# Patient Record
Sex: FEMALE | Race: WHITE | Employment: OTHER | URBAN - METROPOLITAN AREA
[De-identification: names, ages, dates, MRNs, and addresses within clinical notes are randomized per-mention and may not be internally consistent; named-entity substitution may affect disease eponyms.]

---

## 2018-07-03 ENCOUNTER — HOSPITAL ENCOUNTER (OUTPATIENT)
Age: 48
Discharge: HOME | End: 2018-07-05
Payer: COMMERCIAL

## 2018-07-03 PROCEDURE — G0123 SCREEN CERV/VAG THIN LAYER: HCPCS

## 2018-07-03 PROCEDURE — 87624 HPV HI-RISK TYP POOLED RSLT: CPT

## 2018-07-12 LAB
CORRESPONDING PAP CASE #: NORMAL
HPV, HIGH RISK: NEGATIVE

## 2019-03-18 ENCOUNTER — HOSPITAL ENCOUNTER (INPATIENT)
Age: 49
LOS: 4 days | Discharge: HOME | DRG: 638 | End: 2019-03-22
Attending: EMERGENCY MEDICINE | Admitting: FAMILY MEDICINE
Payer: COMMERCIAL

## 2019-03-18 ENCOUNTER — APPOINTMENT (OUTPATIENT)
Dept: GENERAL RADIOLOGY | Age: 49
DRG: 638 | End: 2019-03-18
Payer: COMMERCIAL

## 2019-03-18 ENCOUNTER — HOSPITAL ENCOUNTER (OUTPATIENT)
Age: 49
Discharge: HOME | End: 2019-03-18
Payer: COMMERCIAL

## 2019-03-18 DIAGNOSIS — R07.9 CHEST PAIN, UNSPECIFIED TYPE: ICD-10-CM

## 2019-03-18 DIAGNOSIS — E87.20 ACIDOSIS: ICD-10-CM

## 2019-03-18 DIAGNOSIS — E11.10 TYPE 2 DIABETES MELLITUS WITH KETOACIDOSIS WITHOUT COMA, WITHOUT LONG-TERM CURRENT USE OF INSULIN (HCC): Primary | ICD-10-CM

## 2019-03-18 LAB
ALBUMIN SERPL-MCNC: 4.4 G/DL (ref 3.5–5.2)
ALP BLD-CCNC: 114 U/L (ref 35–104)
ALT SERPL-CCNC: 19 U/L (ref 0–32)
ANION GAP SERPL CALCULATED.3IONS-SCNC: 21 MMOL/L (ref 7–16)
ANION GAP SERPL CALCULATED.3IONS-SCNC: 25 MMOL/L (ref 7–16)
ARTERIAL PH AT TEMPERATURE: 7.23 (ref 7.35–7.45)
AST SERPL-CCNC: 15 U/L (ref 0–31)
BACTERIA: NORMAL /HPF
BASE EXCESS: ABNORMAL MMOL/L (ref -3–3)
BETA-HYDROXYBUTYRATE: >4.5 MMOL/L (ref 0.02–0.27)
BILIRUB SERPL-MCNC: 0.4 MG/DL (ref 0–1.2)
BILIRUBIN URINE: ABNORMAL
BLOOD, URINE: ABNORMAL
BUN BLDV-MCNC: 10 MG/DL (ref 6–20)
BUN BLDV-MCNC: 9 MG/DL (ref 6–20)
CALCIUM SERPL-MCNC: 9.2 MG/DL (ref 8.6–10.2)
CALCIUM SERPL-MCNC: 9.5 MG/DL (ref 8.6–10.2)
CHLORIDE BLD-SCNC: 102 MMOL/L (ref 98–107)
CHLORIDE BLD-SCNC: 104 MMOL/L (ref 98–107)
CLARITY: ABNORMAL
CO2: 9 MMOL/L (ref 22–29)
CO2: 9 MMOL/L (ref 22–29)
COHB: 0.6 % (ref 0–1.5)
COLOR: YELLOW
COMMENT: ABNORMAL
CREAT SERPL-MCNC: 0.6 MG/DL (ref 0.5–1)
CREAT SERPL-MCNC: 0.7 MG/DL (ref 0.5–1)
CRITICAL: ABNORMAL
D DIMER: 202 NG/ML DDU
DATE ANALYZED: ABNORMAL
DATE OF COLLECTION: ABNORMAL
DRAWN BY: ABNORMAL
GFR AFRICAN AMERICAN: >60
GFR AFRICAN AMERICAN: >60
GFR NON-AFRICAN AMERICAN: >60 ML/MIN/1.73
GFR NON-AFRICAN AMERICAN: >60 ML/MIN/1.73
GLUCOSE BLD-MCNC: 184 MG/DL (ref 74–99)
GLUCOSE BLD-MCNC: 220 MG/DL (ref 74–99)
GLUCOSE URINE: >=1000 MG/DL
HCO3: ABNORMAL MMOL/L (ref 22–26)
HCT VFR BLD CALC: 47.6 % (ref 34–48)
HEMOGLOBIN: 15.9 G/DL (ref 11.5–15.5)
HHB: 1.4 % (ref 0–5)
KETONES, URINE: >=80 MG/DL
LAB: ABNORMAL
LACTIC ACID: 1.2 MMOL/L (ref 0.5–2.2)
LEUKOCYTE ESTERASE, URINE: NEGATIVE
Lab: ABNORMAL
MCH RBC QN AUTO: 27.2 PG (ref 26–35)
MCHC RBC AUTO-ENTMCNC: 33.4 % (ref 32–34.5)
MCV RBC AUTO: 81.4 FL (ref 80–99.9)
METER GLUCOSE: 221 MG/DL (ref 74–99)
METER GLUCOSE: 234 MG/DL (ref 74–99)
METHB: 0.6 % (ref 0–1.5)
MODE: ABNORMAL
MODE: ABNORMAL
NITRITE, URINE: NEGATIVE
O2 SATURATION: 98.6 % (ref 92–98.5)
O2 SATURATION: ABNORMAL % (ref 95–100)
O2HB: 97.4 % (ref 94–97)
OPERATOR ID: ABNORMAL
PATIENT TEMP: 37 C
PCO2: <12 MMHG (ref 35–45)
PCO2: <15 MMHG (ref 35–45)
PDW BLD-RTO: 13.5 FL (ref 11.5–15)
PH BLOOD GAS: 7.18 (ref 7.35–7.45)
PH UA: 5 (ref 5–9)
PLATELET # BLD: 236 E9/L (ref 130–450)
PMV BLD AUTO: 10.3 FL (ref 7–12)
PO2: 138.1 MMHG (ref 60–100)
PO2: 153.8 MMHG (ref 60–80)
POTASSIUM SERPL-SCNC: 3.9 MMOL/L (ref 3.5–5)
POTASSIUM SERPL-SCNC: 4.4 MMOL/L (ref 3.5–5)
PROTEIN UA: 100 MG/DL
RBC # BLD: 5.85 E12/L (ref 3.5–5.5)
RBC UA: NORMAL /HPF (ref 0–2)
REASON FOR REJECTION: NORMAL
REJECTED TEST: NORMAL
SAMPLE SITE: ABNORMAL
SODIUM BLD-SCNC: 134 MMOL/L (ref 132–146)
SODIUM BLD-SCNC: 136 MMOL/L (ref 132–146)
SOURCE, BLOOD GAS: ABNORMAL
SPECIFIC GRAVITY UA: 1.02 (ref 1–1.03)
SPECIMEN SOURCE: ABNORMAL
THB: 17.7 G/DL (ref 11.5–16.5)
TIME ANALYZED: 1906
TOTAL PROTEIN: 8.3 G/DL (ref 6.4–8.3)
TROPONIN: <0.01 NG/ML (ref 0–0.03)
UROBILINOGEN, URINE: 0.2 E.U./DL
WBC # BLD: 9.4 E9/L (ref 4.5–11.5)
WBC UA: NORMAL /HPF (ref 0–5)

## 2019-03-18 PROCEDURE — 6370000000 HC RX 637 (ALT 250 FOR IP): Performed by: INTERNAL MEDICINE

## 2019-03-18 PROCEDURE — 87070 CULTURE OTHR SPECIMN AEROBIC: CPT

## 2019-03-18 PROCEDURE — 87081 CULTURE SCREEN ONLY: CPT

## 2019-03-18 PROCEDURE — 99223 1ST HOSP IP/OBS HIGH 75: CPT | Performed by: INTERNAL MEDICINE

## 2019-03-18 PROCEDURE — 02HV33Z INSERTION OF INFUSION DEVICE INTO SUPERIOR VENA CAVA, PERCUTANEOUS APPROACH: ICD-10-PCS | Performed by: INTERNAL MEDICINE

## 2019-03-18 PROCEDURE — 82962 GLUCOSE BLOOD TEST: CPT

## 2019-03-18 PROCEDURE — 82010 KETONE BODYS QUAN: CPT

## 2019-03-18 PROCEDURE — 80053 COMPREHEN METABOLIC PANEL: CPT

## 2019-03-18 PROCEDURE — 71046 X-RAY EXAM CHEST 2 VIEWS: CPT

## 2019-03-18 PROCEDURE — 2580000003 HC RX 258: Performed by: INTERNAL MEDICINE

## 2019-03-18 PROCEDURE — 85378 FIBRIN DEGRADE SEMIQUANT: CPT

## 2019-03-18 PROCEDURE — 82805 BLOOD GASES W/O2 SATURATION: CPT

## 2019-03-18 PROCEDURE — 82803 BLOOD GASES ANY COMBINATION: CPT

## 2019-03-18 PROCEDURE — 80048 BASIC METABOLIC PNL TOTAL CA: CPT

## 2019-03-18 PROCEDURE — 71045 X-RAY EXAM CHEST 1 VIEW: CPT

## 2019-03-18 PROCEDURE — 2500000003 HC RX 250 WO HCPCS: Performed by: INTERNAL MEDICINE

## 2019-03-18 PROCEDURE — 85027 COMPLETE CBC AUTOMATED: CPT

## 2019-03-18 PROCEDURE — 36415 COLL VENOUS BLD VENIPUNCTURE: CPT

## 2019-03-18 PROCEDURE — A0426 ALS 1: HCPCS

## 2019-03-18 PROCEDURE — 6360000002 HC RX W HCPCS: Performed by: INTERNAL MEDICINE

## 2019-03-18 PROCEDURE — 87186 SC STD MICRODIL/AGAR DIL: CPT

## 2019-03-18 PROCEDURE — 81001 URINALYSIS AUTO W/SCOPE: CPT

## 2019-03-18 PROCEDURE — 2000000000 HC ICU R&B

## 2019-03-18 PROCEDURE — 99285 EMERGENCY DEPT VISIT HI MDM: CPT

## 2019-03-18 PROCEDURE — A0425 GROUND MILEAGE: HCPCS

## 2019-03-18 PROCEDURE — 36556 INSERT NON-TUNNEL CV CATH: CPT

## 2019-03-18 PROCEDURE — 83605 ASSAY OF LACTIC ACID: CPT

## 2019-03-18 PROCEDURE — 84484 ASSAY OF TROPONIN QUANT: CPT

## 2019-03-18 PROCEDURE — 93005 ELECTROCARDIOGRAM TRACING: CPT | Performed by: INTERNAL MEDICINE

## 2019-03-18 RX ORDER — DEXTROSE AND SODIUM CHLORIDE 5; .45 G/100ML; G/100ML
INJECTION, SOLUTION INTRAVENOUS CONTINUOUS PRN
Status: DISCONTINUED | OUTPATIENT
Start: 2019-03-18 | End: 2019-03-19

## 2019-03-18 RX ORDER — NICOTINE POLACRILEX 4 MG
15 LOZENGE BUCCAL PRN
Status: DISCONTINUED | OUTPATIENT
Start: 2019-03-18 | End: 2019-03-22 | Stop reason: HOSPADM

## 2019-03-18 RX ORDER — SODIUM CHLORIDE 0.9 % (FLUSH) 0.9 %
10 SYRINGE (ML) INJECTION PRN
Status: DISCONTINUED | OUTPATIENT
Start: 2019-03-18 | End: 2019-03-21

## 2019-03-18 RX ORDER — SODIUM CHLORIDE 9 MG/ML
INJECTION, SOLUTION INTRAVENOUS CONTINUOUS
Status: DISCONTINUED | OUTPATIENT
Start: 2019-03-18 | End: 2019-03-21

## 2019-03-18 RX ORDER — MAGNESIUM SULFATE 1 G/100ML
1 INJECTION INTRAVENOUS PRN
Status: DISCONTINUED | OUTPATIENT
Start: 2019-03-18 | End: 2019-03-20

## 2019-03-18 RX ORDER — ONDANSETRON 2 MG/ML
4 INJECTION INTRAMUSCULAR; INTRAVENOUS EVERY 6 HOURS PRN
Status: DISCONTINUED | OUTPATIENT
Start: 2019-03-18 | End: 2019-03-22 | Stop reason: HOSPADM

## 2019-03-18 RX ORDER — 0.9 % SODIUM CHLORIDE 0.9 %
1000 INTRAVENOUS SOLUTION INTRAVENOUS ONCE
Status: COMPLETED | OUTPATIENT
Start: 2019-03-18 | End: 2019-03-19

## 2019-03-18 RX ORDER — SODIUM CHLORIDE 0.9 % (FLUSH) 0.9 %
10 SYRINGE (ML) INJECTION EVERY 12 HOURS SCHEDULED
Status: DISCONTINUED | OUTPATIENT
Start: 2019-03-18 | End: 2019-03-22 | Stop reason: HOSPADM

## 2019-03-18 RX ORDER — LOSARTAN POTASSIUM 25 MG/1
25 TABLET ORAL DAILY
Status: DISCONTINUED | OUTPATIENT
Start: 2019-03-18 | End: 2019-03-22 | Stop reason: HOSPADM

## 2019-03-18 RX ORDER — DEXTROSE MONOHYDRATE 25 G/50ML
12.5 INJECTION, SOLUTION INTRAVENOUS PRN
Status: DISCONTINUED | OUTPATIENT
Start: 2019-03-18 | End: 2019-03-21 | Stop reason: SDUPTHER

## 2019-03-18 RX ORDER — PRAVASTATIN SODIUM 20 MG
40 TABLET ORAL DAILY
Status: DISCONTINUED | OUTPATIENT
Start: 2019-03-18 | End: 2019-03-22 | Stop reason: HOSPADM

## 2019-03-18 RX ORDER — DEXTROSE MONOHYDRATE 25 G/50ML
12.5 INJECTION, SOLUTION INTRAVENOUS PRN
Status: DISCONTINUED | OUTPATIENT
Start: 2019-03-18 | End: 2019-03-18 | Stop reason: SDUPTHER

## 2019-03-18 RX ORDER — DEXTROSE MONOHYDRATE 50 MG/ML
100 INJECTION, SOLUTION INTRAVENOUS PRN
Status: DISCONTINUED | OUTPATIENT
Start: 2019-03-18 | End: 2019-03-21

## 2019-03-18 RX ORDER — POTASSIUM CHLORIDE 7.45 MG/ML
10 INJECTION INTRAVENOUS PRN
Status: DISCONTINUED | OUTPATIENT
Start: 2019-03-18 | End: 2019-03-20

## 2019-03-18 RX ADMIN — Medication 10 ML: at 23:39

## 2019-03-18 RX ADMIN — ENOXAPARIN SODIUM 40 MG: 40 INJECTION SUBCUTANEOUS at 23:38

## 2019-03-18 RX ADMIN — LOSARTAN POTASSIUM 25 MG: 25 TABLET, FILM COATED ORAL at 23:39

## 2019-03-18 RX ADMIN — INSULIN LISPRO 4 UNITS: 100 INJECTION, SOLUTION INTRAVENOUS; SUBCUTANEOUS at 19:42

## 2019-03-18 RX ADMIN — SODIUM CHLORIDE 0.04 UNITS/KG/HR: 9 INJECTION, SOLUTION INTRAVENOUS at 22:48

## 2019-03-18 RX ADMIN — SODIUM BICARBONATE 50 MEQ: 84 INJECTION INTRAVENOUS at 22:43

## 2019-03-18 RX ADMIN — SODIUM CHLORIDE 1000 ML: 9 INJECTION, SOLUTION INTRAVENOUS at 22:44

## 2019-03-18 RX ADMIN — SODIUM BICARBONATE 50 MEQ: 84 INJECTION INTRAVENOUS at 19:37

## 2019-03-18 ASSESSMENT — PAIN SCALES - GENERAL
PAINLEVEL_OUTOF10: 4
PAINLEVEL_OUTOF10: 0

## 2019-03-18 ASSESSMENT — PAIN DESCRIPTION - LOCATION: LOCATION: CHEST

## 2019-03-18 ASSESSMENT — PAIN DESCRIPTION - DESCRIPTORS: DESCRIPTORS: TIGHTNESS

## 2019-03-18 ASSESSMENT — PAIN DESCRIPTION - PAIN TYPE: TYPE: ACUTE PAIN

## 2019-03-19 LAB
ANION GAP SERPL CALCULATED.3IONS-SCNC: 14 MMOL/L (ref 7–16)
ANION GAP SERPL CALCULATED.3IONS-SCNC: 16 MMOL/L (ref 7–16)
ANION GAP SERPL CALCULATED.3IONS-SCNC: 17 MMOL/L (ref 7–16)
ANION GAP SERPL CALCULATED.3IONS-SCNC: 17 MMOL/L (ref 7–16)
ANION GAP SERPL CALCULATED.3IONS-SCNC: 18 MMOL/L (ref 7–16)
ANION GAP SERPL CALCULATED.3IONS-SCNC: 23 MMOL/L (ref 7–16)
B.E.: -11.9 MMOL/L (ref -3–3)
BUN BLDV-MCNC: 10 MG/DL (ref 6–20)
BUN BLDV-MCNC: 11 MG/DL (ref 6–20)
BUN BLDV-MCNC: 12 MG/DL (ref 6–20)
BUN BLDV-MCNC: 5 MG/DL (ref 6–20)
BUN BLDV-MCNC: 6 MG/DL (ref 6–20)
BUN BLDV-MCNC: 8 MG/DL (ref 6–20)
C-REACTIVE PROTEIN: 0.3 MG/DL (ref 0–0.4)
CALCIUM SERPL-MCNC: 8.1 MG/DL (ref 8.6–10.2)
CALCIUM SERPL-MCNC: 8.3 MG/DL (ref 8.6–10.2)
CALCIUM SERPL-MCNC: 8.4 MG/DL (ref 8.6–10.2)
CALCIUM SERPL-MCNC: 8.5 MG/DL (ref 8.6–10.2)
CHLORIDE BLD-SCNC: 106 MMOL/L (ref 98–107)
CHLORIDE BLD-SCNC: 106 MMOL/L (ref 98–107)
CHLORIDE BLD-SCNC: 107 MMOL/L (ref 98–107)
CHLORIDE BLD-SCNC: 107 MMOL/L (ref 98–107)
CHLORIDE BLD-SCNC: 108 MMOL/L (ref 98–107)
CHLORIDE BLD-SCNC: 111 MMOL/L (ref 98–107)
CO2: 11 MMOL/L (ref 22–29)
CO2: 11 MMOL/L (ref 22–29)
CO2: 12 MMOL/L (ref 22–29)
CO2: 13 MMOL/L (ref 22–29)
CO2: 13 MMOL/L (ref 22–29)
CO2: 17 MMOL/L (ref 22–29)
COHB: 0.9 % (ref 0–1.5)
CREAT SERPL-MCNC: 0.5 MG/DL (ref 0.5–1)
CREAT SERPL-MCNC: 0.6 MG/DL (ref 0.5–1)
CREAT SERPL-MCNC: 0.7 MG/DL (ref 0.5–1)
CREAT SERPL-MCNC: 0.8 MG/DL (ref 0.5–1)
CRITICAL: ABNORMAL
DATE ANALYZED: ABNORMAL
DATE OF COLLECTION: ABNORMAL
EKG ATRIAL RATE: 112 BPM
EKG P AXIS: 31 DEGREES
EKG P-R INTERVAL: 178 MS
EKG Q-T INTERVAL: 332 MS
EKG QRS DURATION: 88 MS
EKG QTC CALCULATION (BAZETT): 453 MS
EKG R AXIS: 7 DEGREES
EKG T AXIS: 40 DEGREES
EKG VENTRICULAR RATE: 112 BPM
GFR AFRICAN AMERICAN: >60
GFR NON-AFRICAN AMERICAN: >60 ML/MIN/1.73
GLUCOSE BLD-MCNC: 160 MG/DL (ref 74–99)
GLUCOSE BLD-MCNC: 165 MG/DL (ref 74–99)
GLUCOSE BLD-MCNC: 175 MG/DL (ref 74–99)
GLUCOSE BLD-MCNC: 182 MG/DL (ref 74–99)
GLUCOSE BLD-MCNC: 185 MG/DL (ref 74–99)
GLUCOSE BLD-MCNC: 203 MG/DL (ref 74–99)
HBA1C MFR BLD: 11 % (ref 4–5.6)
HCO3: 11.8 MMOL/L (ref 22–26)
HHB: 2.1 % (ref 0–5)
LAB: ABNORMAL
Lab: ABNORMAL
MAGNESIUM: 1.7 MG/DL (ref 1.6–2.6)
MAGNESIUM: 1.9 MG/DL (ref 1.6–2.6)
MAGNESIUM: 2 MG/DL (ref 1.6–2.6)
MAGNESIUM: 2.1 MG/DL (ref 1.6–2.6)
METER GLUCOSE: 102 MG/DL (ref 74–99)
METER GLUCOSE: 154 MG/DL (ref 74–99)
METER GLUCOSE: 155 MG/DL (ref 74–99)
METER GLUCOSE: 158 MG/DL (ref 74–99)
METER GLUCOSE: 159 MG/DL (ref 74–99)
METER GLUCOSE: 162 MG/DL (ref 74–99)
METER GLUCOSE: 162 MG/DL (ref 74–99)
METER GLUCOSE: 167 MG/DL (ref 74–99)
METER GLUCOSE: 168 MG/DL (ref 74–99)
METER GLUCOSE: 170 MG/DL (ref 74–99)
METER GLUCOSE: 175 MG/DL (ref 74–99)
METER GLUCOSE: 176 MG/DL (ref 74–99)
METER GLUCOSE: 177 MG/DL (ref 74–99)
METER GLUCOSE: 179 MG/DL (ref 74–99)
METER GLUCOSE: 182 MG/DL (ref 74–99)
METER GLUCOSE: 186 MG/DL (ref 74–99)
METER GLUCOSE: 187 MG/DL (ref 74–99)
METER GLUCOSE: 188 MG/DL (ref 74–99)
METER GLUCOSE: 196 MG/DL (ref 74–99)
METER GLUCOSE: 199 MG/DL (ref 74–99)
METER GLUCOSE: 221 MG/DL (ref 74–99)
METER GLUCOSE: 231 MG/DL (ref 74–99)
METHB: 0.5 % (ref 0–1.5)
MODE: ABNORMAL
O2 SATURATION: 97.9 % (ref 92–98.5)
O2HB: 96.5 % (ref 94–97)
OPERATOR ID: ABNORMAL
PATIENT TEMP: 37 C
PCO2: 23 MMHG (ref 35–45)
PH BLOOD GAS: 7.33 (ref 7.35–7.45)
PHOSPHORUS: 1.9 MG/DL (ref 2.5–4.5)
PHOSPHORUS: 2 MG/DL (ref 2.5–4.5)
PHOSPHORUS: 2.4 MG/DL (ref 2.5–4.5)
PHOSPHORUS: 2.8 MG/DL (ref 2.5–4.5)
PO2: 103.6 MMHG (ref 60–100)
POTASSIUM SERPL-SCNC: 3.1 MMOL/L (ref 3.5–5)
POTASSIUM SERPL-SCNC: 3.4 MMOL/L (ref 3.5–5)
POTASSIUM SERPL-SCNC: 3.6 MMOL/L (ref 3.5–5)
POTASSIUM SERPL-SCNC: 3.7 MMOL/L (ref 3.5–5)
POTASSIUM SERPL-SCNC: 3.7 MMOL/L (ref 3.5–5)
POTASSIUM SERPL-SCNC: 4.1 MMOL/L (ref 3.5–5)
PRO-BNP: 14 PG/ML (ref 0–125)
PROCALCITONIN: 0.02 NG/ML (ref 0–0.08)
SODIUM BLD-SCNC: 136 MMOL/L (ref 132–146)
SODIUM BLD-SCNC: 141 MMOL/L (ref 132–146)
SODIUM BLD-SCNC: 142 MMOL/L (ref 132–146)
SOURCE, BLOOD GAS: ABNORMAL
THB: 15.1 G/DL (ref 11.5–16.5)
TIME ANALYZED: 610
TROPONIN: <0.01 NG/ML (ref 0–0.03)

## 2019-03-19 PROCEDURE — 6360000002 HC RX W HCPCS: Performed by: INTERNAL MEDICINE

## 2019-03-19 PROCEDURE — 36592 COLLECT BLOOD FROM PICC: CPT

## 2019-03-19 PROCEDURE — 82805 BLOOD GASES W/O2 SATURATION: CPT

## 2019-03-19 PROCEDURE — 86140 C-REACTIVE PROTEIN: CPT

## 2019-03-19 PROCEDURE — 2580000003 HC RX 258: Performed by: INTERNAL MEDICINE

## 2019-03-19 PROCEDURE — 2500000003 HC RX 250 WO HCPCS: Performed by: INTERNAL MEDICINE

## 2019-03-19 PROCEDURE — 6360000002 HC RX W HCPCS

## 2019-03-19 PROCEDURE — 6370000000 HC RX 637 (ALT 250 FOR IP): Performed by: INTERNAL MEDICINE

## 2019-03-19 PROCEDURE — 83036 HEMOGLOBIN GLYCOSYLATED A1C: CPT

## 2019-03-19 PROCEDURE — 84145 PROCALCITONIN (PCT): CPT

## 2019-03-19 PROCEDURE — 2000000000 HC ICU R&B

## 2019-03-19 PROCEDURE — 83880 ASSAY OF NATRIURETIC PEPTIDE: CPT

## 2019-03-19 PROCEDURE — 80048 BASIC METABOLIC PNL TOTAL CA: CPT

## 2019-03-19 PROCEDURE — 36415 COLL VENOUS BLD VENIPUNCTURE: CPT

## 2019-03-19 PROCEDURE — 84484 ASSAY OF TROPONIN QUANT: CPT

## 2019-03-19 PROCEDURE — 93010 ELECTROCARDIOGRAM REPORT: CPT | Performed by: INTERNAL MEDICINE

## 2019-03-19 PROCEDURE — 82962 GLUCOSE BLOOD TEST: CPT

## 2019-03-19 PROCEDURE — 83735 ASSAY OF MAGNESIUM: CPT

## 2019-03-19 PROCEDURE — 84100 ASSAY OF PHOSPHORUS: CPT

## 2019-03-19 RX ORDER — SODIUM CHLORIDE, SODIUM LACTATE, POTASSIUM CHLORIDE, AND CALCIUM CHLORIDE .6; .31; .03; .02 G/100ML; G/100ML; G/100ML; G/100ML
1000 INJECTION, SOLUTION INTRAVENOUS ONCE
Status: COMPLETED | OUTPATIENT
Start: 2019-03-19 | End: 2019-03-19

## 2019-03-19 RX ORDER — POTASSIUM CHLORIDE 7.45 MG/ML
INJECTION INTRAVENOUS
Status: COMPLETED
Start: 2019-03-19 | End: 2019-03-19

## 2019-03-19 RX ADMIN — PRAVASTATIN SODIUM 40 MG: 20 TABLET ORAL at 20:47

## 2019-03-19 RX ADMIN — SODIUM CHLORIDE, POTASSIUM CHLORIDE, SODIUM LACTATE AND CALCIUM CHLORIDE 1000 ML: 600; 310; 30; 20 INJECTION, SOLUTION INTRAVENOUS at 14:38

## 2019-03-19 RX ADMIN — POTASSIUM CHLORIDE 10 MEQ: 7.46 INJECTION, SOLUTION INTRAVENOUS at 05:25

## 2019-03-19 RX ADMIN — Medication 10 ML: at 08:04

## 2019-03-19 RX ADMIN — POTASSIUM CHLORIDE 10 MEQ: 7.46 INJECTION, SOLUTION INTRAVENOUS at 09:42

## 2019-03-19 RX ADMIN — DEXTROSE AND SODIUM CHLORIDE: 5; 450 INJECTION, SOLUTION INTRAVENOUS at 00:15

## 2019-03-19 RX ADMIN — POTASSIUM CHLORIDE 10 MEQ: 7.46 INJECTION, SOLUTION INTRAVENOUS at 23:00

## 2019-03-19 RX ADMIN — ENOXAPARIN SODIUM 40 MG: 40 INJECTION SUBCUTANEOUS at 08:04

## 2019-03-19 RX ADMIN — LOSARTAN POTASSIUM 25 MG: 25 TABLET, FILM COATED ORAL at 08:04

## 2019-03-19 RX ADMIN — POTASSIUM CHLORIDE 10 MEQ: 7.46 INJECTION, SOLUTION INTRAVENOUS at 19:19

## 2019-03-19 RX ADMIN — POTASSIUM CHLORIDE 10 MEQ: 7.46 INJECTION, SOLUTION INTRAVENOUS at 02:51

## 2019-03-19 RX ADMIN — PRAVASTATIN SODIUM 40 MG: 20 TABLET ORAL at 00:12

## 2019-03-19 RX ADMIN — POTASSIUM CHLORIDE 10 MEQ: 7.46 INJECTION, SOLUTION INTRAVENOUS at 06:04

## 2019-03-19 RX ADMIN — SODIUM BICARBONATE 50 MEQ: 84 INJECTION, SOLUTION INTRAVENOUS at 05:25

## 2019-03-19 RX ADMIN — POTASSIUM CHLORIDE 10 MEQ: 7.46 INJECTION, SOLUTION INTRAVENOUS at 21:49

## 2019-03-19 RX ADMIN — POTASSIUM CHLORIDE 10 MEQ: 7.46 INJECTION, SOLUTION INTRAVENOUS at 22:30

## 2019-03-19 RX ADMIN — Medication 10 ML: at 08:05

## 2019-03-19 RX ADMIN — SODIUM BICARBONATE: 84 INJECTION, SOLUTION INTRAVENOUS at 12:48

## 2019-03-19 RX ADMIN — SODIUM BICARBONATE: 84 INJECTION, SOLUTION INTRAVENOUS at 20:48

## 2019-03-19 RX ADMIN — POTASSIUM CHLORIDE 10 MEQ: 7.46 INJECTION, SOLUTION INTRAVENOUS at 11:06

## 2019-03-19 RX ADMIN — POTASSIUM CHLORIDE 10 MEQ: 7.46 INJECTION, SOLUTION INTRAVENOUS at 18:01

## 2019-03-19 RX ADMIN — POTASSIUM CHLORIDE 10 MEQ: 7.46 INJECTION, SOLUTION INTRAVENOUS at 06:34

## 2019-03-19 RX ADMIN — POTASSIUM CHLORIDE 10 MEQ: 7.46 INJECTION, SOLUTION INTRAVENOUS at 02:21

## 2019-03-19 RX ADMIN — SODIUM PHOSPHATE, MONOBASIC, MONOHYDRATE 10 MMOL: 276; 142 INJECTION, SOLUTION INTRAVENOUS at 02:03

## 2019-03-19 RX ADMIN — POTASSIUM CHLORIDE 10 MEQ: 7.46 INJECTION, SOLUTION INTRAVENOUS at 03:35

## 2019-03-19 RX ADMIN — POTASSIUM CHLORIDE 10 MEQ: 7.46 INJECTION, SOLUTION INTRAVENOUS at 10:24

## 2019-03-19 RX ADMIN — POTASSIUM CHLORIDE 10 MEQ: 7.46 INJECTION, SOLUTION INTRAVENOUS at 15:22

## 2019-03-19 RX ADMIN — POTASSIUM CHLORIDE 10 MEQ: 7.46 INJECTION, SOLUTION INTRAVENOUS at 01:49

## 2019-03-19 RX ADMIN — Medication 10 ML: at 20:47

## 2019-03-19 RX ADMIN — POTASSIUM CHLORIDE 10 MEQ: 7.46 INJECTION, SOLUTION INTRAVENOUS at 15:56

## 2019-03-19 RX ADMIN — SODIUM PHOSPHATE, MONOBASIC, MONOHYDRATE 20 MMOL: 276; 142 INJECTION, SOLUTION INTRAVENOUS at 16:21

## 2019-03-19 RX ADMIN — POTASSIUM CHLORIDE 10 MEQ: 7.46 INJECTION, SOLUTION INTRAVENOUS at 14:38

## 2019-03-19 RX ADMIN — POTASSIUM CHLORIDE 10 MEQ: 7.46 INJECTION, SOLUTION INTRAVENOUS at 17:40

## 2019-03-19 ASSESSMENT — PAIN SCALES - GENERAL
PAINLEVEL_OUTOF10: 0

## 2019-03-20 LAB
ANION GAP SERPL CALCULATED.3IONS-SCNC: 11 MMOL/L (ref 7–16)
ANION GAP SERPL CALCULATED.3IONS-SCNC: 13 MMOL/L (ref 7–16)
ANION GAP SERPL CALCULATED.3IONS-SCNC: 14 MMOL/L (ref 7–16)
ANION GAP SERPL CALCULATED.3IONS-SCNC: 15 MMOL/L (ref 7–16)
ANION GAP SERPL CALCULATED.3IONS-SCNC: 18 MMOL/L (ref 7–16)
B.E.: -8.5 MMOL/L (ref -3–3)
BETA-HYDROXYBUTYRATE: 3.42 MMOL/L (ref 0.02–0.27)
BUN BLDV-MCNC: 3 MG/DL (ref 6–20)
BUN BLDV-MCNC: 4 MG/DL (ref 6–20)
CALCIUM IONIZED: 1.36 MMOL/L (ref 1.15–1.33)
CALCIUM SERPL-MCNC: 8.2 MG/DL (ref 8.6–10.2)
CALCIUM SERPL-MCNC: 8.4 MG/DL (ref 8.6–10.2)
CALCIUM SERPL-MCNC: 8.6 MG/DL (ref 8.6–10.2)
CHLORIDE BLD-SCNC: 105 MMOL/L (ref 98–107)
CHLORIDE BLD-SCNC: 107 MMOL/L (ref 98–107)
CHLORIDE BLD-SCNC: 107 MMOL/L (ref 98–107)
CHLORIDE BLD-SCNC: 108 MMOL/L (ref 98–107)
CHLORIDE BLD-SCNC: 111 MMOL/L (ref 98–107)
CO2: 16 MMOL/L (ref 22–29)
CO2: 16 MMOL/L (ref 22–29)
CO2: 17 MMOL/L (ref 22–29)
CO2: 17 MMOL/L (ref 22–29)
CO2: 18 MMOL/L (ref 22–29)
COHB: 1.1 % (ref 0–1.5)
CREAT SERPL-MCNC: 0.5 MG/DL (ref 0.5–1)
CRITICAL: ABNORMAL
DATE ANALYZED: ABNORMAL
DATE OF COLLECTION: ABNORMAL
GFR AFRICAN AMERICAN: >60
GFR NON-AFRICAN AMERICAN: >60 ML/MIN/1.73
GLUCOSE BLD-MCNC: 163 MG/DL (ref 74–99)
GLUCOSE BLD-MCNC: 167 MG/DL (ref 74–99)
GLUCOSE BLD-MCNC: 179 MG/DL (ref 74–99)
GLUCOSE BLD-MCNC: 209 MG/DL (ref 74–99)
GLUCOSE BLD-MCNC: 219 MG/DL (ref 74–99)
HCO3: 14.4 MMOL/L (ref 22–26)
HHB: 2.4 % (ref 0–5)
LAB: ABNORMAL
LACTIC ACID: 0.7 MMOL/L (ref 0.5–2.2)
Lab: ABNORMAL
MAGNESIUM: 1.8 MG/DL (ref 1.6–2.6)
METER GLUCOSE: 117 MG/DL (ref 74–99)
METER GLUCOSE: 135 MG/DL (ref 74–99)
METER GLUCOSE: 137 MG/DL (ref 74–99)
METER GLUCOSE: 143 MG/DL (ref 74–99)
METER GLUCOSE: 153 MG/DL (ref 74–99)
METER GLUCOSE: 155 MG/DL (ref 74–99)
METER GLUCOSE: 162 MG/DL (ref 74–99)
METER GLUCOSE: 166 MG/DL (ref 74–99)
METER GLUCOSE: 178 MG/DL (ref 74–99)
METER GLUCOSE: 179 MG/DL (ref 74–99)
METER GLUCOSE: 202 MG/DL (ref 74–99)
METER GLUCOSE: 247 MG/DL (ref 74–99)
METHB: 0.5 % (ref 0–1.5)
MODE: ABNORMAL
MRSA CULTURE ONLY: NORMAL
O2 SATURATION: 97.6 % (ref 92–98.5)
O2HB: 96 % (ref 94–97)
OPERATOR ID: 7278
ORGANISM: ABNORMAL
PATIENT TEMP: 37 C
PCO2: 24.2 MMHG (ref 35–45)
PH BLOOD GAS: 7.39 (ref 7.35–7.45)
PHOSPHORUS: 2.5 MG/DL (ref 2.5–4.5)
PHOSPHORUS: 2.6 MG/DL (ref 2.5–4.5)
PHOSPHORUS: 2.9 MG/DL (ref 2.5–4.5)
PHOSPHORUS: 3 MG/DL (ref 2.5–4.5)
PO2: 97.7 MMHG (ref 60–100)
POTASSIUM SERPL-SCNC: 3.3 MMOL/L (ref 3.5–5)
POTASSIUM SERPL-SCNC: 3.4 MMOL/L (ref 3.5–5)
POTASSIUM SERPL-SCNC: 3.5 MMOL/L (ref 3.5–5)
POTASSIUM SERPL-SCNC: 3.5 MMOL/L (ref 3.5–5)
POTASSIUM SERPL-SCNC: 4.1 MMOL/L (ref 3.5–5)
SODIUM BLD-SCNC: 138 MMOL/L (ref 132–146)
SODIUM BLD-SCNC: 138 MMOL/L (ref 132–146)
SODIUM BLD-SCNC: 139 MMOL/L (ref 132–146)
SOURCE, BLOOD GAS: ABNORMAL
THB: 14.5 G/DL (ref 11.5–16.5)
TIME ANALYZED: 1949
WOUND/ABSCESS: ABNORMAL
WOUND/ABSCESS: ABNORMAL

## 2019-03-20 PROCEDURE — 36592 COLLECT BLOOD FROM PICC: CPT

## 2019-03-20 PROCEDURE — 6360000002 HC RX W HCPCS: Performed by: INTERNAL MEDICINE

## 2019-03-20 PROCEDURE — 36415 COLL VENOUS BLD VENIPUNCTURE: CPT

## 2019-03-20 PROCEDURE — 2000000000 HC ICU R&B

## 2019-03-20 PROCEDURE — 82805 BLOOD GASES W/O2 SATURATION: CPT

## 2019-03-20 PROCEDURE — 2580000003 HC RX 258: Performed by: STUDENT IN AN ORGANIZED HEALTH CARE EDUCATION/TRAINING PROGRAM

## 2019-03-20 PROCEDURE — 82962 GLUCOSE BLOOD TEST: CPT

## 2019-03-20 PROCEDURE — 2580000003 HC RX 258: Performed by: INTERNAL MEDICINE

## 2019-03-20 PROCEDURE — 80048 BASIC METABOLIC PNL TOTAL CA: CPT

## 2019-03-20 PROCEDURE — 83605 ASSAY OF LACTIC ACID: CPT

## 2019-03-20 PROCEDURE — 82010 KETONE BODYS QUAN: CPT

## 2019-03-20 PROCEDURE — 83735 ASSAY OF MAGNESIUM: CPT

## 2019-03-20 PROCEDURE — 99233 SBSQ HOSP IP/OBS HIGH 50: CPT | Performed by: INTERNAL MEDICINE

## 2019-03-20 PROCEDURE — 6370000000 HC RX 637 (ALT 250 FOR IP): Performed by: INTERNAL MEDICINE

## 2019-03-20 PROCEDURE — 6370000000 HC RX 637 (ALT 250 FOR IP): Performed by: STUDENT IN AN ORGANIZED HEALTH CARE EDUCATION/TRAINING PROGRAM

## 2019-03-20 PROCEDURE — 2500000003 HC RX 250 WO HCPCS: Performed by: INTERNAL MEDICINE

## 2019-03-20 PROCEDURE — 84100 ASSAY OF PHOSPHORUS: CPT

## 2019-03-20 PROCEDURE — 82330 ASSAY OF CALCIUM: CPT

## 2019-03-20 RX ORDER — NICOTINE POLACRILEX 4 MG
15 LOZENGE BUCCAL PRN
Qty: 45 G | Refills: 1 | Status: SHIPPED | OUTPATIENT
Start: 2019-03-20 | End: 2020-06-15

## 2019-03-20 RX ORDER — INSULIN GLARGINE 100 [IU]/ML
15 INJECTION, SOLUTION SUBCUTANEOUS NIGHTLY
Qty: 1 VIAL | Refills: 3 | Status: SHIPPED | OUTPATIENT
Start: 2019-03-21 | End: 2019-03-22 | Stop reason: HOSPADM

## 2019-03-20 RX ORDER — POTASSIUM CHLORIDE 29.8 MG/ML
20 INJECTION INTRAVENOUS ONCE
Status: COMPLETED | OUTPATIENT
Start: 2019-03-20 | End: 2019-03-20

## 2019-03-20 RX ORDER — LANCETS 30 GAUGE
1 EACH MISCELLANEOUS DAILY
Qty: 100 EACH | Refills: 3 | Status: SHIPPED | OUTPATIENT
Start: 2019-03-20

## 2019-03-20 RX ORDER — INSULIN GLARGINE 100 [IU]/ML
15 INJECTION, SOLUTION SUBCUTANEOUS NIGHTLY
Status: DISCONTINUED | OUTPATIENT
Start: 2019-03-20 | End: 2019-03-21

## 2019-03-20 RX ORDER — POTASSIUM CHLORIDE 20 MEQ/1
40 TABLET, EXTENDED RELEASE ORAL ONCE
Status: DISCONTINUED | OUTPATIENT
Start: 2019-03-20 | End: 2019-03-22

## 2019-03-20 RX ORDER — POTASSIUM CHLORIDE 20 MEQ/1
60 TABLET, EXTENDED RELEASE ORAL ONCE
Status: COMPLETED | OUTPATIENT
Start: 2019-03-20 | End: 2019-03-20

## 2019-03-20 RX ADMIN — PRAVASTATIN SODIUM 40 MG: 20 TABLET ORAL at 21:01

## 2019-03-20 RX ADMIN — LOSARTAN POTASSIUM 25 MG: 25 TABLET, FILM COATED ORAL at 08:45

## 2019-03-20 RX ADMIN — POTASSIUM CHLORIDE 10 MEQ: 7.46 INJECTION, SOLUTION INTRAVENOUS at 06:30

## 2019-03-20 RX ADMIN — SODIUM CHLORIDE 0.02 UNITS/KG/HR: 9 INJECTION, SOLUTION INTRAVENOUS at 07:15

## 2019-03-20 RX ADMIN — Medication 10 ML: at 22:10

## 2019-03-20 RX ADMIN — SODIUM BICARBONATE: 84 INJECTION, SOLUTION INTRAVENOUS at 04:56

## 2019-03-20 RX ADMIN — POTASSIUM CHLORIDE 20 MEQ: 29.8 INJECTION, SOLUTION INTRAVENOUS at 10:23

## 2019-03-20 RX ADMIN — POTASSIUM CHLORIDE 10 MEQ: 7.46 INJECTION, SOLUTION INTRAVENOUS at 01:50

## 2019-03-20 RX ADMIN — POTASSIUM CHLORIDE 10 MEQ: 7.46 INJECTION, SOLUTION INTRAVENOUS at 06:00

## 2019-03-20 RX ADMIN — POTASSIUM CHLORIDE 60 MEQ: 20 TABLET, EXTENDED RELEASE ORAL at 18:59

## 2019-03-20 RX ADMIN — POTASSIUM CHLORIDE 10 MEQ: 7.46 INJECTION, SOLUTION INTRAVENOUS at 01:19

## 2019-03-20 RX ADMIN — POTASSIUM CHLORIDE 10 MEQ: 7.46 INJECTION, SOLUTION INTRAVENOUS at 09:32

## 2019-03-20 RX ADMIN — ENOXAPARIN SODIUM 40 MG: 40 INJECTION SUBCUTANEOUS at 08:48

## 2019-03-20 RX ADMIN — ONDANSETRON HYDROCHLORIDE 4 MG: 2 SOLUTION INTRAMUSCULAR; INTRAVENOUS at 23:25

## 2019-03-20 RX ADMIN — INSULIN GLARGINE 15 UNITS: 100 INJECTION, SOLUTION SUBCUTANEOUS at 08:43

## 2019-03-20 RX ADMIN — ONDANSETRON HYDROCHLORIDE 4 MG: 2 SOLUTION INTRAMUSCULAR; INTRAVENOUS at 01:13

## 2019-03-20 RX ADMIN — SODIUM PHOSPHATE, MONOBASIC, MONOHYDRATE 10 MMOL: 276; 142 INJECTION, SOLUTION INTRAVENOUS at 05:38

## 2019-03-20 RX ADMIN — ONDANSETRON HYDROCHLORIDE 4 MG: 2 SOLUTION INTRAMUSCULAR; INTRAVENOUS at 07:29

## 2019-03-20 RX ADMIN — Medication 10 ML: at 08:45

## 2019-03-20 RX ADMIN — INSULIN LISPRO 2 UNITS: 100 INJECTION, SOLUTION INTRAVENOUS; SUBCUTANEOUS at 12:01

## 2019-03-20 RX ADMIN — POTASSIUM CHLORIDE 10 MEQ: 7.46 INJECTION, SOLUTION INTRAVENOUS at 05:30

## 2019-03-20 RX ADMIN — POTASSIUM CHLORIDE 10 MEQ: 7.46 INJECTION, SOLUTION INTRAVENOUS at 04:57

## 2019-03-20 RX ADMIN — POTASSIUM CHLORIDE 10 MEQ: 7.46 INJECTION, SOLUTION INTRAVENOUS at 02:20

## 2019-03-20 ASSESSMENT — PAIN SCALES - GENERAL
PAINLEVEL_OUTOF10: 0

## 2019-03-21 LAB
ALBUMIN SERPL-MCNC: 3.4 G/DL (ref 3.5–5.2)
ALP BLD-CCNC: 95 U/L (ref 35–104)
ALT SERPL-CCNC: 28 U/L (ref 0–32)
ANION GAP SERPL CALCULATED.3IONS-SCNC: 12 MMOL/L (ref 7–16)
ANION GAP SERPL CALCULATED.3IONS-SCNC: 13 MMOL/L (ref 7–16)
ANION GAP SERPL CALCULATED.3IONS-SCNC: 14 MMOL/L (ref 7–16)
ANION GAP SERPL CALCULATED.3IONS-SCNC: 15 MMOL/L (ref 7–16)
ANION GAP SERPL CALCULATED.3IONS-SCNC: 18 MMOL/L (ref 7–16)
ANION GAP SERPL CALCULATED.3IONS-SCNC: 18 MMOL/L (ref 7–16)
AST SERPL-CCNC: 25 U/L (ref 0–31)
BETA-HYDROXYBUTYRATE: 2.76 MMOL/L (ref 0.02–0.27)
BILIRUB SERPL-MCNC: 0.4 MG/DL (ref 0–1.2)
BUN BLDV-MCNC: 3 MG/DL (ref 6–20)
CALCIUM IONIZED: 1.31 MMOL/L (ref 1.15–1.33)
CALCIUM SERPL-MCNC: 8.3 MG/DL (ref 8.6–10.2)
CALCIUM SERPL-MCNC: 8.4 MG/DL (ref 8.6–10.2)
CALCIUM SERPL-MCNC: 8.5 MG/DL (ref 8.6–10.2)
CALCIUM SERPL-MCNC: 8.5 MG/DL (ref 8.6–10.2)
CALCIUM SERPL-MCNC: 8.6 MG/DL (ref 8.6–10.2)
CALCIUM SERPL-MCNC: 8.8 MG/DL (ref 8.6–10.2)
CHLORIDE BLD-SCNC: 106 MMOL/L (ref 98–107)
CHLORIDE BLD-SCNC: 106 MMOL/L (ref 98–107)
CHLORIDE BLD-SCNC: 107 MMOL/L (ref 98–107)
CHLORIDE BLD-SCNC: 107 MMOL/L (ref 98–107)
CHLORIDE BLD-SCNC: 108 MMOL/L (ref 98–107)
CHLORIDE BLD-SCNC: 109 MMOL/L (ref 98–107)
CO2: 16 MMOL/L (ref 22–29)
CO2: 16 MMOL/L (ref 22–29)
CO2: 18 MMOL/L (ref 22–29)
CO2: 19 MMOL/L (ref 22–29)
CREAT SERPL-MCNC: 0.5 MG/DL (ref 0.5–1)
CREAT SERPL-MCNC: 0.6 MG/DL (ref 0.5–1)
GFR AFRICAN AMERICAN: >60
GFR NON-AFRICAN AMERICAN: >60 ML/MIN/1.73
GLUCOSE BLD-MCNC: 135 MG/DL (ref 74–99)
GLUCOSE BLD-MCNC: 142 MG/DL (ref 74–99)
GLUCOSE BLD-MCNC: 169 MG/DL (ref 74–99)
GLUCOSE BLD-MCNC: 175 MG/DL (ref 74–99)
GLUCOSE BLD-MCNC: 188 MG/DL (ref 74–99)
GLUCOSE BLD-MCNC: 220 MG/DL (ref 74–99)
LACTIC ACID: 0.7 MMOL/L (ref 0.5–2.2)
MAGNESIUM: 1.7 MG/DL (ref 1.6–2.6)
MAGNESIUM: 1.8 MG/DL (ref 1.6–2.6)
METER GLUCOSE: 125 MG/DL (ref 74–99)
METER GLUCOSE: 134 MG/DL (ref 74–99)
METER GLUCOSE: 151 MG/DL (ref 74–99)
METER GLUCOSE: 151 MG/DL (ref 74–99)
METER GLUCOSE: 152 MG/DL (ref 74–99)
METER GLUCOSE: 156 MG/DL (ref 74–99)
METER GLUCOSE: 165 MG/DL (ref 74–99)
METER GLUCOSE: 167 MG/DL (ref 74–99)
METER GLUCOSE: 167 MG/DL (ref 74–99)
METER GLUCOSE: 170 MG/DL (ref 74–99)
METER GLUCOSE: 174 MG/DL (ref 74–99)
METER GLUCOSE: 177 MG/DL (ref 74–99)
METER GLUCOSE: 178 MG/DL (ref 74–99)
METER GLUCOSE: 218 MG/DL (ref 74–99)
PHOSPHORUS: 2.8 MG/DL (ref 2.5–4.5)
PHOSPHORUS: 2.9 MG/DL (ref 2.5–4.5)
POTASSIUM SERPL-SCNC: 3.5 MMOL/L (ref 3.5–5)
POTASSIUM SERPL-SCNC: 3.6 MMOL/L (ref 3.5–5)
POTASSIUM SERPL-SCNC: 3.7 MMOL/L (ref 3.5–5)
POTASSIUM SERPL-SCNC: 3.8 MMOL/L (ref 3.5–5)
POTASSIUM SERPL-SCNC: 3.8 MMOL/L (ref 3.5–5)
POTASSIUM SERPL-SCNC: 4.2 MMOL/L (ref 3.5–5)
SODIUM BLD-SCNC: 138 MMOL/L (ref 132–146)
SODIUM BLD-SCNC: 138 MMOL/L (ref 132–146)
SODIUM BLD-SCNC: 139 MMOL/L (ref 132–146)
SODIUM BLD-SCNC: 140 MMOL/L (ref 132–146)
SODIUM BLD-SCNC: 141 MMOL/L (ref 132–146)
SODIUM BLD-SCNC: 142 MMOL/L (ref 132–146)
TOTAL PROTEIN: 6.4 G/DL (ref 6.4–8.3)

## 2019-03-21 PROCEDURE — 2580000003 HC RX 258: Performed by: INTERNAL MEDICINE

## 2019-03-21 PROCEDURE — 36415 COLL VENOUS BLD VENIPUNCTURE: CPT

## 2019-03-21 PROCEDURE — 80053 COMPREHEN METABOLIC PANEL: CPT

## 2019-03-21 PROCEDURE — 2500000003 HC RX 250 WO HCPCS: Performed by: INTERNAL MEDICINE

## 2019-03-21 PROCEDURE — 82330 ASSAY OF CALCIUM: CPT

## 2019-03-21 PROCEDURE — 2000000000 HC ICU R&B

## 2019-03-21 PROCEDURE — 6370000000 HC RX 637 (ALT 250 FOR IP): Performed by: INTERNAL MEDICINE

## 2019-03-21 PROCEDURE — 83735 ASSAY OF MAGNESIUM: CPT

## 2019-03-21 PROCEDURE — 6360000002 HC RX W HCPCS

## 2019-03-21 PROCEDURE — 83605 ASSAY OF LACTIC ACID: CPT

## 2019-03-21 PROCEDURE — 6360000002 HC RX W HCPCS: Performed by: INTERNAL MEDICINE

## 2019-03-21 PROCEDURE — 99233 SBSQ HOSP IP/OBS HIGH 50: CPT | Performed by: INTERNAL MEDICINE

## 2019-03-21 PROCEDURE — 82962 GLUCOSE BLOOD TEST: CPT

## 2019-03-21 PROCEDURE — 6370000000 HC RX 637 (ALT 250 FOR IP): Performed by: STUDENT IN AN ORGANIZED HEALTH CARE EDUCATION/TRAINING PROGRAM

## 2019-03-21 PROCEDURE — 80048 BASIC METABOLIC PNL TOTAL CA: CPT

## 2019-03-21 PROCEDURE — 82010 KETONE BODYS QUAN: CPT

## 2019-03-21 PROCEDURE — 84100 ASSAY OF PHOSPHORUS: CPT

## 2019-03-21 RX ORDER — INSULIN GLARGINE 100 [IU]/ML
20 INJECTION, SOLUTION SUBCUTANEOUS ONCE
Status: COMPLETED | OUTPATIENT
Start: 2019-03-21 | End: 2019-03-21

## 2019-03-21 RX ORDER — DEXTROSE AND SODIUM CHLORIDE 5; .45 G/100ML; G/100ML
INJECTION, SOLUTION INTRAVENOUS CONTINUOUS
Status: DISCONTINUED | OUTPATIENT
Start: 2019-03-21 | End: 2019-03-21

## 2019-03-21 RX ORDER — MAGNESIUM SULFATE 1 G/100ML
1 INJECTION INTRAVENOUS PRN
Status: DISCONTINUED | OUTPATIENT
Start: 2019-03-21 | End: 2019-03-22

## 2019-03-21 RX ORDER — CETIRIZINE HYDROCHLORIDE 10 MG/1
10 TABLET ORAL DAILY
Status: DISCONTINUED | OUTPATIENT
Start: 2019-03-21 | End: 2019-03-22 | Stop reason: HOSPADM

## 2019-03-21 RX ORDER — FLUTICASONE PROPIONATE 50 MCG
1 SPRAY, SUSPENSION (ML) NASAL DAILY
Status: DISCONTINUED | OUTPATIENT
Start: 2019-03-21 | End: 2019-03-22 | Stop reason: HOSPADM

## 2019-03-21 RX ORDER — SODIUM CHLORIDE 9 MG/ML
INJECTION, SOLUTION INTRAVENOUS CONTINUOUS
Status: DISCONTINUED | OUTPATIENT
Start: 2019-03-21 | End: 2019-03-21

## 2019-03-21 RX ORDER — DEXTROSE MONOHYDRATE 25 G/50ML
12.5 INJECTION, SOLUTION INTRAVENOUS PRN
Status: DISCONTINUED | OUTPATIENT
Start: 2019-03-21 | End: 2019-03-22 | Stop reason: HOSPADM

## 2019-03-21 RX ORDER — POTASSIUM CHLORIDE 29.8 MG/ML
20 INJECTION INTRAVENOUS PRN
Status: DISCONTINUED | OUTPATIENT
Start: 2019-03-21 | End: 2019-03-21

## 2019-03-21 RX ORDER — INSULIN GLARGINE 100 [IU]/ML
20 INJECTION, SOLUTION SUBCUTANEOUS NIGHTLY
Status: DISCONTINUED | OUTPATIENT
Start: 2019-03-22 | End: 2019-03-21

## 2019-03-21 RX ORDER — POTASSIUM CHLORIDE 29.8 MG/ML
INJECTION INTRAVENOUS
Status: COMPLETED
Start: 2019-03-21 | End: 2019-03-21

## 2019-03-21 RX ORDER — SODIUM CHLORIDE 9 MG/ML
INJECTION, SOLUTION INTRAVENOUS CONTINUOUS
Status: DISCONTINUED | OUTPATIENT
Start: 2019-03-21 | End: 2019-03-22

## 2019-03-21 RX ORDER — 0.9 % SODIUM CHLORIDE 0.9 %
15 INTRAVENOUS SOLUTION INTRAVENOUS ONCE
Status: DISCONTINUED | OUTPATIENT
Start: 2019-03-21 | End: 2019-03-22

## 2019-03-21 RX ORDER — DEXTROSE AND SODIUM CHLORIDE 5; .45 G/100ML; G/100ML
INJECTION, SOLUTION INTRAVENOUS CONTINUOUS PRN
Status: DISCONTINUED | OUTPATIENT
Start: 2019-03-21 | End: 2019-03-22

## 2019-03-21 RX ORDER — DEXTROSE AND SODIUM CHLORIDE 5; .45 G/100ML; G/100ML
INJECTION, SOLUTION INTRAVENOUS CONTINUOUS PRN
Status: DISCONTINUED | OUTPATIENT
Start: 2019-03-21 | End: 2019-03-21

## 2019-03-21 RX ORDER — POTASSIUM CHLORIDE 29.8 MG/ML
20 INJECTION INTRAVENOUS PRN
Status: DISCONTINUED | OUTPATIENT
Start: 2019-03-21 | End: 2019-03-22

## 2019-03-21 RX ORDER — POTASSIUM CHLORIDE 7.45 MG/ML
10 INJECTION INTRAVENOUS PRN
Status: DISCONTINUED | OUTPATIENT
Start: 2019-03-21 | End: 2019-03-21

## 2019-03-21 RX ADMIN — Medication 10 ML: at 08:58

## 2019-03-21 RX ADMIN — FLUTICASONE PROPIONATE 1 SPRAY: 50 SPRAY, METERED NASAL at 07:04

## 2019-03-21 RX ADMIN — CETIRIZINE HYDROCHLORIDE 10 MG: 10 TABLET, FILM COATED ORAL at 08:57

## 2019-03-21 RX ADMIN — POTASSIUM CHLORIDE 20 MEQ: 29.8 INJECTION, SOLUTION INTRAVENOUS at 23:30

## 2019-03-21 RX ADMIN — SODIUM CHLORIDE 0.91 UNITS/HR: 9 INJECTION, SOLUTION INTRAVENOUS at 20:24

## 2019-03-21 RX ADMIN — Medication 10 ML: at 20:30

## 2019-03-21 RX ADMIN — POTASSIUM CHLORIDE 20 MEQ: 29.8 INJECTION, SOLUTION INTRAVENOUS at 14:08

## 2019-03-21 RX ADMIN — DEXTROSE AND SODIUM CHLORIDE: 5; 450 INJECTION, SOLUTION INTRAVENOUS at 01:14

## 2019-03-21 RX ADMIN — POTASSIUM CHLORIDE 20 MEQ: 29.8 INJECTION, SOLUTION INTRAVENOUS at 14:46

## 2019-03-21 RX ADMIN — POTASSIUM CHLORIDE 20 MEQ: 29.8 INJECTION, SOLUTION INTRAVENOUS at 02:23

## 2019-03-21 RX ADMIN — ENOXAPARIN SODIUM 40 MG: 40 INJECTION SUBCUTANEOUS at 08:58

## 2019-03-21 RX ADMIN — LOSARTAN POTASSIUM 25 MG: 25 TABLET, FILM COATED ORAL at 08:58

## 2019-03-21 RX ADMIN — POTASSIUM CHLORIDE 20 MEQ: 29.8 INJECTION, SOLUTION INTRAVENOUS at 03:20

## 2019-03-21 RX ADMIN — SODIUM CHLORIDE 3.21 UNITS/HR: 9 INJECTION, SOLUTION INTRAVENOUS at 02:17

## 2019-03-21 RX ADMIN — SODIUM BICARBONATE: 84 INJECTION, SOLUTION INTRAVENOUS at 20:20

## 2019-03-21 RX ADMIN — INSULIN LISPRO 2 UNITS: 100 INJECTION, SOLUTION INTRAVENOUS; SUBCUTANEOUS at 12:18

## 2019-03-21 RX ADMIN — PRAVASTATIN SODIUM 40 MG: 20 TABLET ORAL at 20:48

## 2019-03-21 RX ADMIN — DEXTROSE AND SODIUM CHLORIDE: 5; 450 INJECTION, SOLUTION INTRAVENOUS at 08:52

## 2019-03-21 RX ADMIN — INSULIN GLARGINE 20 UNITS: 100 INJECTION, SOLUTION SUBCUTANEOUS at 09:50

## 2019-03-21 RX ADMIN — POTASSIUM CHLORIDE 20 MEQ: 29.8 INJECTION, SOLUTION INTRAVENOUS at 06:01

## 2019-03-21 RX ADMIN — POTASSIUM CHLORIDE 20 MEQ: 29.8 INJECTION, SOLUTION INTRAVENOUS at 07:03

## 2019-03-21 ASSESSMENT — PAIN SCALES - GENERAL
PAINLEVEL_OUTOF10: 0

## 2019-03-21 ASSESSMENT — PAIN DESCRIPTION - PAIN TYPE: TYPE: ACUTE PAIN

## 2019-03-22 VITALS
TEMPERATURE: 98 F | HEART RATE: 110 BPM | SYSTOLIC BLOOD PRESSURE: 129 MMHG | OXYGEN SATURATION: 97 % | BODY MASS INDEX: 40.98 KG/M2 | HEIGHT: 65 IN | RESPIRATION RATE: 19 BRPM | WEIGHT: 246 LBS | DIASTOLIC BLOOD PRESSURE: 82 MMHG

## 2019-03-22 LAB
ANION GAP SERPL CALCULATED.3IONS-SCNC: 13 MMOL/L (ref 7–16)
BACTERIA: ABNORMAL /HPF
BILIRUBIN URINE: NEGATIVE
BILIRUBIN URINE: NEGATIVE
BLOOD, URINE: NEGATIVE
BLOOD, URINE: NEGATIVE
BUN BLDV-MCNC: 2 MG/DL (ref 6–20)
BUN BLDV-MCNC: 3 MG/DL (ref 6–20)
CALCIUM SERPL-MCNC: 8.1 MG/DL (ref 8.6–10.2)
CALCIUM SERPL-MCNC: 8.3 MG/DL (ref 8.6–10.2)
CALCIUM SERPL-MCNC: 8.4 MG/DL (ref 8.6–10.2)
CALCIUM SERPL-MCNC: 8.5 MG/DL (ref 8.6–10.2)
CHLORIDE BLD-SCNC: 103 MMOL/L (ref 98–107)
CHLORIDE BLD-SCNC: 103 MMOL/L (ref 98–107)
CHLORIDE BLD-SCNC: 104 MMOL/L (ref 98–107)
CHLORIDE BLD-SCNC: 106 MMOL/L (ref 98–107)
CLARITY: CLEAR
CLARITY: CLEAR
CO2: 20 MMOL/L (ref 22–29)
CO2: 23 MMOL/L (ref 22–29)
CO2: 23 MMOL/L (ref 22–29)
CO2: 25 MMOL/L (ref 22–29)
COLOR: YELLOW
COLOR: YELLOW
CREAT SERPL-MCNC: 0.5 MG/DL (ref 0.5–1)
CREATININE URINE: 55 MG/DL (ref 29–226)
EPITHELIAL CELLS, UA: ABNORMAL /HPF
GFR AFRICAN AMERICAN: >60
GFR NON-AFRICAN AMERICAN: >60 ML/MIN/1.73
GLUCOSE BLD-MCNC: 144 MG/DL (ref 74–99)
GLUCOSE BLD-MCNC: 182 MG/DL (ref 74–99)
GLUCOSE BLD-MCNC: 182 MG/DL (ref 74–99)
GLUCOSE BLD-MCNC: 184 MG/DL (ref 74–99)
GLUCOSE URINE: 500 MG/DL
GLUCOSE URINE: >=1000 MG/DL
KETONES, URINE: 15 MG/DL
KETONES, URINE: 15 MG/DL
LEUKOCYTE ESTERASE, URINE: NEGATIVE
LEUKOCYTE ESTERASE, URINE: NEGATIVE
MAGNESIUM: 1.6 MG/DL (ref 1.6–2.6)
MAGNESIUM: 2.2 MG/DL (ref 1.6–2.6)
MAGNESIUM: 2.4 MG/DL (ref 1.6–2.6)
METER GLUCOSE: 142 MG/DL (ref 74–99)
METER GLUCOSE: 158 MG/DL (ref 74–99)
METER GLUCOSE: 159 MG/DL (ref 74–99)
METER GLUCOSE: 169 MG/DL (ref 74–99)
METER GLUCOSE: 169 MG/DL (ref 74–99)
METER GLUCOSE: 175 MG/DL (ref 74–99)
METER GLUCOSE: 194 MG/DL (ref 74–99)
MICROALBUMIN UR-MCNC: 205.2 MG/L
MICROALBUMIN/CREAT UR-RTO: 373.1 (ref 0–30)
NITRITE, URINE: NEGATIVE
NITRITE, URINE: NEGATIVE
PH UA: 6.5 (ref 5–9)
PH UA: 6.5 (ref 5–9)
PHOSPHORUS: 2.7 MG/DL (ref 2.5–4.5)
PHOSPHORUS: 3.3 MG/DL (ref 2.5–4.5)
PHOSPHORUS: 4 MG/DL (ref 2.5–4.5)
POTASSIUM SERPL-SCNC: 3.4 MMOL/L (ref 3.5–5)
POTASSIUM SERPL-SCNC: 3.5 MMOL/L (ref 3.5–5)
POTASSIUM SERPL-SCNC: 3.5 MMOL/L (ref 3.5–5)
POTASSIUM SERPL-SCNC: 3.7 MMOL/L (ref 3.5–5)
PROTEIN PROTEIN: 39 MG/DL (ref 0–12)
PROTEIN UA: ABNORMAL MG/DL
PROTEIN UA: NEGATIVE MG/DL
PROTEIN/CREAT RATIO: 0.7
PROTEIN/CREAT RATIO: 0.7 (ref 0–0.2)
RBC UA: ABNORMAL /HPF (ref 0–2)
SODIUM BLD-SCNC: 139 MMOL/L (ref 132–146)
SODIUM BLD-SCNC: 142 MMOL/L (ref 132–146)
SPECIFIC GRAVITY UA: 1.01 (ref 1–1.03)
SPECIFIC GRAVITY UA: <=1.005 (ref 1–1.03)
UROBILINOGEN, URINE: 0.2 E.U./DL
UROBILINOGEN, URINE: 0.2 E.U./DL
WBC UA: ABNORMAL /HPF (ref 0–5)

## 2019-03-22 PROCEDURE — 83735 ASSAY OF MAGNESIUM: CPT

## 2019-03-22 PROCEDURE — 6360000002 HC RX W HCPCS: Performed by: INTERNAL MEDICINE

## 2019-03-22 PROCEDURE — 82570 ASSAY OF URINE CREATININE: CPT

## 2019-03-22 PROCEDURE — 84156 ASSAY OF PROTEIN URINE: CPT

## 2019-03-22 PROCEDURE — 36415 COLL VENOUS BLD VENIPUNCTURE: CPT

## 2019-03-22 PROCEDURE — 6370000000 HC RX 637 (ALT 250 FOR IP): Performed by: INTERNAL MEDICINE

## 2019-03-22 PROCEDURE — 80048 BASIC METABOLIC PNL TOTAL CA: CPT

## 2019-03-22 PROCEDURE — 2580000003 HC RX 258: Performed by: INTERNAL MEDICINE

## 2019-03-22 PROCEDURE — 81003 URINALYSIS AUTO W/O SCOPE: CPT

## 2019-03-22 PROCEDURE — 6370000000 HC RX 637 (ALT 250 FOR IP): Performed by: STUDENT IN AN ORGANIZED HEALTH CARE EDUCATION/TRAINING PROGRAM

## 2019-03-22 PROCEDURE — 82962 GLUCOSE BLOOD TEST: CPT

## 2019-03-22 PROCEDURE — 81001 URINALYSIS AUTO W/SCOPE: CPT

## 2019-03-22 PROCEDURE — 84100 ASSAY OF PHOSPHORUS: CPT

## 2019-03-22 PROCEDURE — 82044 UR ALBUMIN SEMIQUANTITATIVE: CPT

## 2019-03-22 PROCEDURE — 2500000003 HC RX 250 WO HCPCS: Performed by: INTERNAL MEDICINE

## 2019-03-22 PROCEDURE — 99233 SBSQ HOSP IP/OBS HIGH 50: CPT | Performed by: INTERNAL MEDICINE

## 2019-03-22 RX ORDER — CETIRIZINE HYDROCHLORIDE 10 MG/1
10 TABLET ORAL DAILY
Qty: 30 TABLET | Refills: 0 | Status: SHIPPED | OUTPATIENT
Start: 2019-03-23 | End: 2020-06-15

## 2019-03-22 RX ORDER — POTASSIUM CHLORIDE 7.45 MG/ML
10 INJECTION INTRAVENOUS
Status: DISCONTINUED | OUTPATIENT
Start: 2019-03-22 | End: 2019-03-22

## 2019-03-22 RX ORDER — POTASSIUM CHLORIDE 20 MEQ/1
40 TABLET, EXTENDED RELEASE ORAL ONCE
Status: COMPLETED | OUTPATIENT
Start: 2019-03-22 | End: 2019-03-22

## 2019-03-22 RX ORDER — FLUTICASONE PROPIONATE 50 MCG
1 SPRAY, SUSPENSION (ML) NASAL DAILY
Qty: 1 BOTTLE | Refills: 3 | Status: SHIPPED | OUTPATIENT
Start: 2019-03-23

## 2019-03-22 RX ORDER — INSULIN GLARGINE 100 [IU]/ML
25 INJECTION, SOLUTION SUBCUTANEOUS NIGHTLY
Status: DISCONTINUED | OUTPATIENT
Start: 2019-03-22 | End: 2019-03-22 | Stop reason: HOSPADM

## 2019-03-22 RX ORDER — LORAZEPAM 1 MG/1
1 TABLET ORAL ONCE
Status: COMPLETED | OUTPATIENT
Start: 2019-03-22 | End: 2019-03-22

## 2019-03-22 RX ORDER — INSULIN GLARGINE 100 [IU]/ML
25 INJECTION, SOLUTION SUBCUTANEOUS ONCE
Status: COMPLETED | OUTPATIENT
Start: 2019-03-22 | End: 2019-03-22

## 2019-03-22 RX ADMIN — CETIRIZINE HYDROCHLORIDE 10 MG: 10 TABLET, FILM COATED ORAL at 08:51

## 2019-03-22 RX ADMIN — LOSARTAN POTASSIUM 25 MG: 25 TABLET, FILM COATED ORAL at 08:51

## 2019-03-22 RX ADMIN — INSULIN LISPRO 4 UNITS: 100 INJECTION, SOLUTION INTRAVENOUS; SUBCUTANEOUS at 11:49

## 2019-03-22 RX ADMIN — INSULIN LISPRO 1 UNITS: 100 INJECTION, SOLUTION INTRAVENOUS; SUBCUTANEOUS at 11:49

## 2019-03-22 RX ADMIN — INSULIN LISPRO 1 UNITS: 100 INJECTION, SOLUTION INTRAVENOUS; SUBCUTANEOUS at 08:04

## 2019-03-22 RX ADMIN — INSULIN GLARGINE 25 UNITS: 100 INJECTION, SOLUTION SUBCUTANEOUS at 02:30

## 2019-03-22 RX ADMIN — POTASSIUM CHLORIDE 20 MEQ: 29.8 INJECTION, SOLUTION INTRAVENOUS at 02:10

## 2019-03-22 RX ADMIN — INSULIN LISPRO 4 UNITS: 100 INJECTION, SOLUTION INTRAVENOUS; SUBCUTANEOUS at 17:08

## 2019-03-22 RX ADMIN — Medication 10 ML: at 08:53

## 2019-03-22 RX ADMIN — INSULIN LISPRO 1 UNITS: 100 INJECTION, SOLUTION INTRAVENOUS; SUBCUTANEOUS at 17:09

## 2019-03-22 RX ADMIN — MAGNESIUM SULFATE HEPTAHYDRATE 1 G: 1 INJECTION, SOLUTION INTRAVENOUS at 03:11

## 2019-03-22 RX ADMIN — SODIUM PHOSPHATE, MONOBASIC, MONOHYDRATE 10 MMOL: 276; 142 INJECTION, SOLUTION INTRAVENOUS at 04:16

## 2019-03-22 RX ADMIN — POTASSIUM CHLORIDE 20 MEQ: 29.8 INJECTION, SOLUTION INTRAVENOUS at 00:24

## 2019-03-22 RX ADMIN — POTASSIUM CHLORIDE 20 MEQ: 29.8 INJECTION, SOLUTION INTRAVENOUS at 03:12

## 2019-03-22 RX ADMIN — LORAZEPAM 1 MG: 1 TABLET ORAL at 12:05

## 2019-03-22 RX ADMIN — ENOXAPARIN SODIUM 40 MG: 40 INJECTION SUBCUTANEOUS at 08:53

## 2019-03-22 RX ADMIN — POTASSIUM CHLORIDE 40 MEQ: 20 TABLET, EXTENDED RELEASE ORAL at 18:00

## 2019-03-22 RX ADMIN — MAGNESIUM SULFATE HEPTAHYDRATE 1 G: 1 INJECTION, SOLUTION INTRAVENOUS at 02:12

## 2019-03-22 RX ADMIN — FLUTICASONE PROPIONATE 1 SPRAY: 50 SPRAY, METERED NASAL at 08:51

## 2019-03-22 ASSESSMENT — PAIN SCALES - GENERAL
PAINLEVEL_OUTOF10: 0

## 2020-06-01 ENCOUNTER — PREP FOR PROCEDURE (OUTPATIENT)
Dept: PAIN MANAGEMENT | Age: 50
End: 2020-06-01

## 2020-06-01 ENCOUNTER — VIRTUAL VISIT (OUTPATIENT)
Dept: PAIN MANAGEMENT | Age: 50
End: 2020-06-01
Payer: COMMERCIAL

## 2020-06-01 PROBLEM — M79.2 NEURALGIA AND NEURITIS: Status: ACTIVE | Noted: 2020-06-01

## 2020-06-01 PROBLEM — G57.71 COMPLEX REGIONAL PAIN SYNDROME TYPE 2 OF RIGHT LOWER EXTREMITY: Status: ACTIVE | Noted: 2020-06-01

## 2020-06-01 PROBLEM — G89.29 CHRONIC PAIN OF RIGHT ANKLE: Status: ACTIVE | Noted: 2020-06-01

## 2020-06-01 PROBLEM — G89.29 CHRONIC PAIN IN RIGHT FOOT: Status: ACTIVE | Noted: 2020-06-01

## 2020-06-01 PROBLEM — M79.671 CHRONIC PAIN IN RIGHT FOOT: Status: ACTIVE | Noted: 2020-06-01

## 2020-06-01 PROBLEM — M25.571 CHRONIC PAIN OF RIGHT ANKLE: Status: ACTIVE | Noted: 2020-06-01

## 2020-06-01 PROCEDURE — 99205 OFFICE O/P NEW HI 60 MIN: CPT | Performed by: ANESTHESIOLOGY

## 2020-06-01 RX ORDER — ATORVASTATIN CALCIUM 40 MG/1
60 TABLET, FILM COATED ORAL NIGHTLY
Status: ON HOLD | COMMUNITY
Start: 2020-05-22 | End: 2022-08-04 | Stop reason: HOSPADM

## 2020-06-01 RX ORDER — ACETAMINOPHEN 160 MG
TABLET,DISINTEGRATING ORAL DAILY
COMMUNITY
End: 2021-12-01 | Stop reason: DRUGHIGH

## 2020-06-01 RX ORDER — TRAMADOL HYDROCHLORIDE 50 MG/1
50 TABLET ORAL EVERY 8 HOURS PRN
COMMUNITY
End: 2021-09-09

## 2020-06-01 RX ORDER — DULOXETIN HYDROCHLORIDE 60 MG/1
60 CAPSULE, DELAYED RELEASE ORAL NIGHTLY
COMMUNITY
End: 2022-02-07 | Stop reason: SDUPTHER

## 2020-06-01 NOTE — PROGRESS NOTES
illicit drugs or sleep aids increases the risk of respiratory depression including death. We discussed that these medications may cause drowsiness, sedation or dizziness and have counseled the patient not to drive or operate machinery. We have discussed that these medications will be prescribed only by one provider. We have discussed with the patient about age related risk factors and have thoroughly discussed the importance of taking these medications as prescribed. The patient verbalizes understanding. The patient was counseled at length about the risks of king Covid-19 during their perioperative period and any recovery window from their procedure. The patient was made aware that king Covid-19  may worsen their prognosis for recovering from their procedure  and lend to a higher morbidity and/or mortality risk. All material risks, benefits, and reasonable alternatives including postponing the procedure were discussed. The patient does wish to proceed with the procedure at this time. Patient advised regarding steps to help prevent the spread of COVID-19   SOURCE - https://franklin-felipa.info/. html     1-Stay home except to get medical care  2-Clean your hands often for at least 20 seconds, avoid touching: Avoid touching your eyes, nose, and mouth with unwashed hands. 3-Seek medical attention: Seek prompt medical attention if your illness is worsening (e.g., difficulty breathing). Call you doctor first.  3-Wear a facemask if you are sick   4-Cover your coughs and sneezes           I affirm this is a Patient Initiated Episode with a New Patient who has not had a related appointment within my department in the past 7 days or scheduled within the next 24 hours. Total time : > 60 mins spent including counsleing/ coordination of care and documentation. Extensive review of prior records done (in [facility] everywhere).       Gwen Leonardo MD    Dear

## 2020-06-12 ENCOUNTER — HOSPITAL ENCOUNTER (OUTPATIENT)
Age: 50
Discharge: HOME | End: 2020-06-14
Payer: COMMERCIAL

## 2020-06-12 PROCEDURE — U0003 INFECTIOUS AGENT DETECTION BY NUCLEIC ACID (DNA OR RNA); SEVERE ACUTE RESPIRATORY SYNDROME CORONAVIRUS 2 (SARS-COV-2) (CORONAVIRUS DISEASE [COVID-19]), AMPLIFIED PROBE TECHNIQUE, MAKING USE OF HIGH THROUGHPUT TECHNOLOGIES AS DESCRIBED BY CMS-2020-01-R: HCPCS

## 2020-06-14 LAB
SARS-COV-2: NOT DETECTED
SOURCE: NORMAL

## 2020-06-18 ENCOUNTER — ANESTHESIA EVENT (OUTPATIENT)
Dept: OPERATING ROOM | Age: 50
End: 2020-06-18
Payer: COMMERCIAL

## 2020-06-18 ENCOUNTER — ANESTHESIA (OUTPATIENT)
Dept: OPERATING ROOM | Age: 50
End: 2020-06-18
Payer: COMMERCIAL

## 2020-06-18 ENCOUNTER — HOSPITAL ENCOUNTER (OUTPATIENT)
Dept: GENERAL RADIOLOGY | Age: 50
Setting detail: OUTPATIENT SURGERY
Discharge: HOME | End: 2020-06-20
Attending: ANESTHESIOLOGY
Payer: COMMERCIAL

## 2020-06-18 ENCOUNTER — HOSPITAL ENCOUNTER (OUTPATIENT)
Age: 50
Setting detail: OUTPATIENT SURGERY
Discharge: HOME | End: 2020-06-18
Attending: ANESTHESIOLOGY | Admitting: ANESTHESIOLOGY
Payer: COMMERCIAL

## 2020-06-18 VITALS
BODY MASS INDEX: 41.65 KG/M2 | OXYGEN SATURATION: 95 % | HEART RATE: 84 BPM | TEMPERATURE: 77.4 F | SYSTOLIC BLOOD PRESSURE: 139 MMHG | RESPIRATION RATE: 18 BRPM | DIASTOLIC BLOOD PRESSURE: 64 MMHG | WEIGHT: 250 LBS | HEIGHT: 65 IN

## 2020-06-18 VITALS — OXYGEN SATURATION: 97 % | SYSTOLIC BLOOD PRESSURE: 166 MMHG | DIASTOLIC BLOOD PRESSURE: 79 MMHG

## 2020-06-18 LAB
HCG(URINE) PREGNANCY TEST: NEGATIVE
METER GLUCOSE: 165 MG/DL (ref 74–99)

## 2020-06-18 PROCEDURE — 6360000002 HC RX W HCPCS: Performed by: NURSE ANESTHETIST, CERTIFIED REGISTERED

## 2020-06-18 PROCEDURE — 2500000003 HC RX 250 WO HCPCS: Performed by: ANESTHESIOLOGY

## 2020-06-18 PROCEDURE — 2709999900 HC NON-CHARGEABLE SUPPLY: Performed by: ANESTHESIOLOGY

## 2020-06-18 PROCEDURE — 3700000000 HC ANESTHESIA ATTENDED CARE: Performed by: ANESTHESIOLOGY

## 2020-06-18 PROCEDURE — 3600000012 HC SURGERY LEVEL 2 ADDTL 15MIN: Performed by: ANESTHESIOLOGY

## 2020-06-18 PROCEDURE — 7100000011 HC PHASE II RECOVERY - ADDTL 15 MIN: Performed by: ANESTHESIOLOGY

## 2020-06-18 PROCEDURE — 82962 GLUCOSE BLOOD TEST: CPT

## 2020-06-18 PROCEDURE — 7100000010 HC PHASE II RECOVERY - FIRST 15 MIN: Performed by: ANESTHESIOLOGY

## 2020-06-18 PROCEDURE — 81025 URINE PREGNANCY TEST: CPT

## 2020-06-18 PROCEDURE — 64520 N BLOCK LUMBAR/THORACIC: CPT | Performed by: ANESTHESIOLOGY

## 2020-06-18 PROCEDURE — 3600000002 HC SURGERY LEVEL 2 BASE: Performed by: ANESTHESIOLOGY

## 2020-06-18 PROCEDURE — 6360000002 HC RX W HCPCS: Performed by: ANESTHESIOLOGY

## 2020-06-18 PROCEDURE — 6360000004 HC RX CONTRAST MEDICATION: Performed by: ANESTHESIOLOGY

## 2020-06-18 PROCEDURE — 3700000001 HC ADD 15 MINUTES (ANESTHESIA): Performed by: ANESTHESIOLOGY

## 2020-06-18 PROCEDURE — 3209999900 FLUORO FOR SURGICAL PROCEDURES

## 2020-06-18 PROCEDURE — 2580000003 HC RX 258: Performed by: NURSE ANESTHETIST, CERTIFIED REGISTERED

## 2020-06-18 RX ORDER — DEXAMETHASONE SODIUM PHOSPHATE 10 MG/ML
INJECTION, SOLUTION INTRAMUSCULAR; INTRAVENOUS PRN
Status: DISCONTINUED | OUTPATIENT
Start: 2020-06-18 | End: 2020-06-18 | Stop reason: ALTCHOICE

## 2020-06-18 RX ORDER — BUPIVACAINE HYDROCHLORIDE 2.5 MG/ML
INJECTION, SOLUTION EPIDURAL; INFILTRATION; INTRACAUDAL PRN
Status: DISCONTINUED | OUTPATIENT
Start: 2020-06-18 | End: 2020-06-18 | Stop reason: ALTCHOICE

## 2020-06-18 RX ORDER — SODIUM CHLORIDE 9 MG/ML
INJECTION, SOLUTION INTRAVENOUS CONTINUOUS PRN
Status: DISCONTINUED | OUTPATIENT
Start: 2020-06-18 | End: 2020-06-18 | Stop reason: SDUPTHER

## 2020-06-18 RX ORDER — LIDOCAINE HYDROCHLORIDE 5 MG/ML
INJECTION, SOLUTION INFILTRATION; INTRAVENOUS PRN
Status: DISCONTINUED | OUTPATIENT
Start: 2020-06-18 | End: 2020-06-18 | Stop reason: ALTCHOICE

## 2020-06-18 RX ORDER — MIDAZOLAM HYDROCHLORIDE 1 MG/ML
INJECTION INTRAMUSCULAR; INTRAVENOUS PRN
Status: DISCONTINUED | OUTPATIENT
Start: 2020-06-18 | End: 2020-06-18 | Stop reason: SDUPTHER

## 2020-06-18 RX ORDER — FENTANYL CITRATE 50 UG/ML
INJECTION, SOLUTION INTRAMUSCULAR; INTRAVENOUS PRN
Status: DISCONTINUED | OUTPATIENT
Start: 2020-06-18 | End: 2020-06-18 | Stop reason: SDUPTHER

## 2020-06-18 RX ADMIN — FENTANYL CITRATE 50 MCG: 50 INJECTION, SOLUTION INTRAMUSCULAR; INTRAVENOUS at 12:06

## 2020-06-18 RX ADMIN — FENTANYL CITRATE 50 MCG: 50 INJECTION, SOLUTION INTRAMUSCULAR; INTRAVENOUS at 12:12

## 2020-06-18 RX ADMIN — MIDAZOLAM 1 MG: 1 INJECTION INTRAMUSCULAR; INTRAVENOUS at 11:59

## 2020-06-18 RX ADMIN — SODIUM CHLORIDE: 9 INJECTION, SOLUTION INTRAVENOUS at 11:33

## 2020-06-18 RX ADMIN — MIDAZOLAM 1 MG: 1 INJECTION INTRAMUSCULAR; INTRAVENOUS at 12:02

## 2020-06-18 RX ADMIN — FENTANYL CITRATE 50 MCG: 50 INJECTION, SOLUTION INTRAMUSCULAR; INTRAVENOUS at 12:04

## 2020-06-18 RX ADMIN — FENTANYL CITRATE 50 MCG: 50 INJECTION, SOLUTION INTRAMUSCULAR; INTRAVENOUS at 12:09

## 2020-06-18 ASSESSMENT — PULMONARY FUNCTION TESTS
PIF_VALUE: 0
PIF_VALUE: 1
PIF_VALUE: 0

## 2020-06-18 ASSESSMENT — PAIN DESCRIPTION - DESCRIPTORS
DESCRIPTORS: ACHING
DESCRIPTORS: ACHING;STABBING

## 2020-06-18 ASSESSMENT — PAIN DESCRIPTION - ORIENTATION: ORIENTATION: RIGHT

## 2020-06-18 ASSESSMENT — PAIN - FUNCTIONAL ASSESSMENT: PAIN_FUNCTIONAL_ASSESSMENT: 0-10

## 2020-06-18 ASSESSMENT — PAIN SCALES - GENERAL: PAINLEVEL_OUTOF10: 6

## 2020-06-18 ASSESSMENT — PAIN DESCRIPTION - LOCATION: LOCATION: FOOT

## 2020-06-18 NOTE — OP NOTE
prepped with chloraprep and draped in usual sterile fashion. AP fluoroscopy was then used to identify the vertebral body adriana the targeted area. The fluoroscopic beam was obliqued such that lateral aspect of the transverse process was overlying the lateral margin of the vertebral body. A target point was chosen at the inferior margin of the transverse process, adjacent to the vertebral body. The skin and subcutaneous tissues at the above level were anesthestized with 0.5% lidocaine. A # 22 gauge 7 inch spinal needle was directed under intermittent fluoroscopy and advanced parallel to the fluoroscopic beam. The needle tip was slowly walked off inferiorly below the transverse process. Once the needle was past the transverse process, lateral fluoroscopic view was used to advance the needle tip to the anterior margin of the vertebral body. Then, after confirming negative aspiration, 1 cc of omnipaque 240 was injected confirming proper postioning. A solution of 0.25% marcaine and 10 mg Dexamethasone 8 cc was injected. The needle was then removed. After procedure was completed, the patient back was cleaned, bandage placed over the needle insertion site. Disposition the patient tolerated the procedure well and there were no complications . Vital signs remained stable throughout the procedure. The patient was escorted to the recovery area where they remained until discharge and written discharge instructions for the procedure were given. Plan: Kev Norris will return to our pain management center as scheduled.      Segundo Adame MD

## 2020-06-18 NOTE — ANESTHESIA PRE PROCEDURE
Department of Anesthesiology  Preprocedure Note       Name:  Robert Dean   Age:  47 y.o.  :  1970                                          MRN:  [id number]         Date:  2020      Surgeon: Claudia Allison):  Tk Murillo MD    Procedure: Procedure(s):  RIGHT LUMBAR SYMPATHETIC BLOCK UNDER FLUORO    Medications prior to admission:   Prior to Admission medications    Medication Sig Start Date End Date Taking? Authorizing Provider   DULoxetine (CYMBALTA) 60 MG extended release capsule Take 60 mg by mouth daily   Yes Historical Provider, MD   traMADol (ULTRAM) 50 MG tablet Take 50 mg by mouth every 8 hours as needed. Yes Historical Provider, MD   Cholecalciferol (VITAMIN D3) 50 MCG (2000) CAPS Take by mouth daily    Yes Historical Provider, MD   atorvastatin (LIPITOR) 40 MG tablet Take 60 mg by mouth daily  20  Yes Historical Provider, MD   insulin glargine (LANTUS SOLOSTAR) 100 UNIT/ML injection pen Inject 25 Units into the skin nightly  Patient taking differently: Inject 60 Units into the skin nightly  3/22/19  Yes Maritza Segal MD   insulin lispro (HUMALOG KWIKPEN) 100 UNIT/ML pen Inject 4 Units into the skin 3 times daily (before meals)  Patient taking differently: Inject 8 Units into the skin 3 times daily (before meals)  3/22/19  Yes Irma Grubbs MD   fluticasone (FLONASE) 50 MCG/ACT nasal spray 1 spray by Each Nare route daily 3/23/19   Maritza Segal MD   Lancets MISC 1 each by Does not apply route daily 3/20/19   Irma Grubbs MD   Insulin Pen Needle 31G X 6 MM MISC 1 each by Does not apply route daily 3/20/19   Irma Grubbs MD   losartan (COZAAR) 25 MG tablet Take 50 mg by mouth daily     Historical Provider, MD       Current medications:    No current facility-administered medications for this encounter.         Allergies:  No Known Allergies    Problem List:    Patient Active Problem List   Diagnosis Code    Major depressive disorder, recurrent episode, moderate ([facility] F33.1    Component Value Date     03/22/2019    K 3.4 03/22/2019     03/22/2019    CO2 25 03/22/2019    BUN 3 03/22/2019    CREATININE 0.5 03/22/2019    GFRAA >60 03/22/2019    LABGLOM >60 03/22/2019    GLUCOSE 144 03/22/2019    PROT 6.4 03/21/2019    CALCIUM 8.3 03/22/2019    BILITOT 0.4 03/21/2019    ALKPHOS 95 03/21/2019    AST 25 03/21/2019    ALT 28 03/21/2019       POC Tests: No results for input(s): POCGLU, POCNA, POCK, POCCL, POCBUN, POCHEMO, POCHCT in the last 72 hours. Coags: No results found for: PROTIME, INR, APTT    HCG (If Applicable): No results found for: PREGTESTUR, PREGSERUM, HCG, HCGQUANT     ABGs: No results found for: PHART, PO2ART, XZA4KMZ, IJB2QFV, BEART, O7UOSWIP     Type & Screen (If Applicable):  No results found for: LABABO, LABRH    Drug/Infectious Status (If Applicable):  No results found for: HIV, HEPCAB    COVID-19 Screening (If Applicable):   Lab Results   Component Value Date    COVID19 Not Detected 06/12/2020         Anesthesia Evaluation  Patient summary reviewed   history of anesthetic complications: PONV. Airway: Mallampati: II  TM distance: >3 FB   Neck ROM: full   Dental:          Pulmonary: breath sounds clear to auscultation                            ROS comment: Seasonal allergies  Former Smoker    Cardiovascular:    (+) hypertension:, hyperlipidemia        Rhythm: regular  Rate: normal           Beta Blocker:  Not on Beta Blocker         Neuro/Psych:   (+) neuromuscular disease (COMPLEX REGIONAL PAIN SYNDROME TYPE 2 RIGHT LOWER EXTREMITY):, depression/anxiety             GI/Hepatic/Renal:   (+) morbid obesity          Endo/Other:    (+) DiabetesType II DM, using insulin, . Abdominal:           Vascular: negative vascular ROS. Anesthesia Plan      MAC     ASA 3       Induction: intravenous. Anesthetic plan and risks discussed with patient. Plan discussed with CRNA.                   Raghu Johnson MD 6/18/2020

## 2020-06-18 NOTE — ANESTHESIA POSTPROCEDURE EVALUATION
Department of Anesthesiology  Postprocedure Note    Patient: Federico Nino  MRN: [id number]  YOB: 1970  Date of evaluation: 6/18/2020  Time:  1:14 PM     Procedure Summary     Date:  06/18/20 Room / Location:  [address]    Anesthesia Start:  8832 Anesthesia Stop:  3901    Procedure:  RIGHT LUMBAR SYMPATHETIC BLOCK UNDER FLUORO (Right Back) Diagnosis:  (COMPLEX REGIONAL PAIN SYNDROME TYPE 2 RIGHT LOWER EXTREMITY)    Surgeon:  Arnold Chao MD Responsible Provider:  Mari Alvarez MD    Anesthesia Type:  MAC ASA Status:  3          Anesthesia Type: MAC    Raffi Phase I: Raffi Score: 10    Raffi Phase II:      Last vitals: Reviewed and per EMR flowsheets.        Anesthesia Post Evaluation    Patient location during evaluation: PACU  Patient participation: complete - patient participated  Level of consciousness: awake and alert  Airway patency: patent  Nausea & Vomiting: no nausea and no vomiting  Complications: no  Cardiovascular status: hemodynamically stable  Respiratory status: acceptable  Hydration status: euvolemic

## 2020-06-18 NOTE — H&P
[facility], [address] [place] [address] Jesus  [phone]    Procedure History & Physical      Wilber Ko     HPI:    Patient  is here for Right Lumbar sympathetic block for right lower extremity pain. Labs/imaging studies reviewed   All question and concerns addressed including R/B/A associated with the procedure      Past Medical History:   Diagnosis Date    Diabetes mellitus ([address]     Hyperlipidemia     Hypertension     Nerve pain     right foot    Obese     PONV (postoperative nausea and vomiting)     Seasonal allergies        Past Surgical History:   Procedure Laterality Date    BREAST SURGERY  years ago    removal cysts    NEUROMA SURGERY Right     done x 3 / last one 2/2019    SHOULDER SURGERY Left     rotator cuff done x 3 / last one 2012       Prior to Admission medications    Medication Sig Start Date End Date Taking? Authorizing Provider   DULoxetine (CYMBALTA) 60 MG extended release capsule Take 60 mg by mouth daily   Yes Historical Provider, MD   traMADol (ULTRAM) 50 MG tablet Take 50 mg by mouth every 8 hours as needed.     Yes Historical Provider, MD   Cholecalciferol (VITAMIN D3) 50 MCG (2000 UT) CAPS Take by mouth daily    Yes Historical Provider, MD   atorvastatin (LIPITOR) 40 MG tablet Take 60 mg by mouth daily  5/22/20  Yes Historical Provider, MD   insulin glargine (LANTUS SOLOSTAR) 100 UNIT/ML injection pen Inject 25 Units into the skin nightly  Patient taking differently: Inject 60 Units into the skin nightly  3/22/19  Yes Allison Jensen MD   insulin lispro (HUMALOG KWIKPEN) 100 UNIT/ML pen Inject 4 Units into the skin 3 times daily (before meals)  Patient taking differently: Inject 8 Units into the skin 3 times daily (before meals)  3/22/19  Yes Irma Grubbs MD   fluticasone (FLONASE) 50 MCG/ACT nasal spray 1 spray by Each Nare route daily 3/23/19  Yes Irma Grubbs MD   losartan (COZAAR) 25 MG tablet Take 50 mg by mouth daily    Yes Historical Provider, MD   Lancets MISC 1 each by Does not apply route daily 3/20/19   Irma Grubbs MD   Insulin Pen Needle 31G X 6 MM MISC 1 each by Does not apply route daily 3/20/19   Sydney Marie MD       No Known Allergies    Social History     Socioeconomic History    Marital status:      Spouse name: Not on file    Number of children: Not on file    Years of education: Not on file    Highest education level: Not on file   Occupational History    Not on file   Social Needs    Financial resource strain: Not on file    Food insecurity     Worry: Not on file     Inability: Not on file    Transportation needs     Medical: Not on file     Non-medical: Not on file   Tobacco Use    Smoking status: Former Smoker     Packs/day: 1.00     Years: 12.00     Pack years: 12.00     Types: Cigarettes     Last attempt to quit: 6/15/2005     Years since quitting: 15.0    Smokeless tobacco: Never Used   Substance and Sexual Activity    Alcohol use: Yes     Comment: rarely    Drug use: Never    Sexual activity: Not on file   Lifestyle    Physical activity     Days per week: Not on file     Minutes per session: Not on file    Stress: Not on file   Relationships    Social connections     Talks on phone: Not on file     Gets together: Not on file     Attends Jainism service: Not on file     Active member of club or organization: Not on file     Attends meetings of clubs or organizations: Not on file     Relationship status: Not on file    Intimate partner violence     Fear of current or ex partner: Not on file     Emotionally abused: Not on file     Physically abused: Not on file     Forced sexual activity: Not on file   Other Topics Concern    Not on file   Social History Narrative    Not on file       Family History   Problem Relation Age of Onset    Heart Attack Mother     Heart Attack Father 48    Heart Attack Maternal Grandmother     Heart Attack Other     Mult Sclerosis Sister            REVIEW

## 2020-07-01 ENCOUNTER — HOSPITAL ENCOUNTER (OUTPATIENT)
Age: 50
Discharge: HOME | End: 2020-07-03

## 2020-07-01 PROCEDURE — 88305 TISSUE EXAM BY PATHOLOGIST: CPT

## 2020-08-27 ENCOUNTER — OFFICE VISIT (OUTPATIENT)
Dept: PAIN MANAGEMENT | Age: 50
End: 2020-08-27
Payer: COMMERCIAL

## 2020-08-27 VITALS
BODY MASS INDEX: 43.32 KG/M2 | SYSTOLIC BLOOD PRESSURE: 132 MMHG | DIASTOLIC BLOOD PRESSURE: 72 MMHG | RESPIRATION RATE: 18 BRPM | HEIGHT: 65 IN | WEIGHT: 260 LBS | HEART RATE: 93 BPM | OXYGEN SATURATION: 96 % | TEMPERATURE: 98 F

## 2020-08-27 PROCEDURE — 99213 OFFICE O/P EST LOW 20 MIN: CPT | Performed by: ANESTHESIOLOGY

## 2020-08-27 NOTE — PROGRESS NOTES
Do you currently have any of the following:    Fever: No  Headache:  No  Cough: No  Shortness of breath: No  Exposed to anyone with these symptoms: No         Anuel Malissa presents to the [facility] on 8/27/2020. Mattie Morris is complaining of pain in the right foot on the top and around the ankle . The pain is constant. The pain is described as aching, throbbing, shooting and stabbing. Pain is rated on her best day at a 7, on her worst day at a 7, and on average at a 7 on the VAS scale. She took her last dose of Tramadol . Any procedures since your last visit: Yes, with none % relief. Pacemaker or defibrilator: No managed by none. She is  on NSAIDS and is not on anticoagulation medication  /72   Pulse 93   Temp 98 °F (36.7 °C)   Resp 18   Ht 5' 5\" (1.651 m)   Wt 260 lb (117.9 kg)   SpO2 96%   BMI 43.27 kg/m²      No LMP recorded. Patient has had an injection.
swelling noted      Dermatology:     Skin:no rashes or lesions noted    Assessment/Plan:   Diagnosis Orders   1. Complex regional pain syndrome type 2 of right lower extremity     2. Neuralgia and neuritis     3. Chronic pain of right ankle     4. Chronic pain in right foot       H/o fall in 2018 following which she had right ankle injury / tendon injury. S/P surgery for the same. Had developed neuroma and subsequently had surgeries for neuroma treatment- complicated by infection for which she had undergone I & D.     Having pain mainly over the right lateral ankle, foot, lateral three toes and numbness. Intermittent swelling and color changes +. Failed multiple modalities of treatment including meds/ PT/ local injection etc.     Features of neuropathic pain/ CRPS -type 2. Status post right lumbar sympathetic block with no significant long-term pain relief. Considering ongoing significant pain with moderate functional limitation, failed multiple modalities of conservative treatment including therapy medications and interventional procedures she would be an appropriate candidate to proceed with spinal cord stimulation trial.    Detailed discussion about the trial process done. Psych eval for SCS trial.    F/U in 3 weeks after psych eval.    Continue HEP/PT as tolerated.     Kerry Pruitt MD

## 2020-09-03 ENCOUNTER — TELEPHONE (OUTPATIENT)
Dept: PAIN MANAGEMENT | Age: 50
End: 2020-09-03

## 2020-09-03 NOTE — TELEPHONE ENCOUNTER
SW called PT regarding evaluation for SCS appointment but PT was unavailable. Left v-mail requesting that the PT call to schedule the appointment.      GIA Nettles

## 2020-10-16 ENCOUNTER — INITIAL CONSULT (OUTPATIENT)
Dept: PAIN MANAGEMENT | Age: 50
End: 2020-10-16
Payer: COMMERCIAL

## 2020-10-16 PROCEDURE — 90834 PSYTX W PT 45 MINUTES: CPT | Performed by: SOCIAL WORKER

## 2020-10-16 NOTE — PROGRESS NOTES
Patient: Brandon Duran    Date of service: 10/16/2020    D. O.B.    1970     50 y.o. Those attending session : patient      DIAGNOSIS:  F45.1 somatic symptom disorder with predominant severe pain. I.  REFERRAL:     Naun Silva was referred for a psychological evaluation prior to consideration for a utilization of a spinal cord stimulator. This is being considered as a method for control of chronic pain. Naun Silva was present and on time for the evaluation. She was verbal, made good eye contact, appeared relaxed and provided all the necessary information to complete the evaluation. II. BACKGROUND INFORMATION:     A social history was conducted. Currently patient is . Living Arrangements: Lives in her own home with her spouse and 2 adult children  2 sons, Jacqueline Kay 22 (living at home), Mavis Najera 21 and 1 daughter Gomez Del Angel 25 (living at home)  Employment: Disabled  Financial social security disability and SSI  Legal none  Domestic Assessment   none reported  The patient reports the following stressors: Chronic pain (foot)    PSYCHOSOCIAL HISTORY    The patient [place] The patient raised in an 2 parent home, Middle class family. Describes childhood as: \"OH, good. There was security, just a good childhood\"  Siblings: one sister Meloniebrennen Edgar, who is living in [place] and a younger brother, Ray Chavez, who passed away complication to a neck surgery. Family relationships: Both parent have passed away, is close to her sister. Relationships in the family are good. Sexual orientation/gender identification: Heterosexual   history:none  Spiritual/ Protestant orientation: Christian attends [employer]  Cultural beliefs: Middle Class American   Educational history: Graduated from [employer] in 1997, [employer]. Abuse History: yes,   Trauma: yes, loss of brother and parents. Had some counseling after the death of her father.           Medically,    When asked to describe her pain on a 0 to 10 scale with 0 being none and 10 being excruciating, she says she averages about 8/10. Janeen Leger felt that analgesic medications had little or no affect. Physically, the patient says she is capable of walking for only short to moderate distances if she walks slowly. Janeen Leger says that. Janeen Leger is independent in self-care. When asked if she had found anything that assisted with her pain relief, Janeen Leger stated  that she uses  Some Advil but then sits down and puts her foot up when things become quite painful. Janeen Leger does not report a history of mental health treatment. When questioned about addictive problems, Janeen Leger reported drinking alcohol about 7 times a year, 6 or 7 beers per occasion, last used alcohol on her birthday. One DUI at age 23, was pulled over for speeding and had been drinking. Denies using recreational drugs. Janeen Leger reported that she did not like to take prescription medication and does not want to be dependent on \"anything\"     Avocational interests are: \"My grand children\" and \" I cook a lot\"      The patient feels that she has a normal diet. She does not do any type of  regular formalized exercise. Janeen Leger appears to enjoy very strong social support system. She states  she has strong support from family and friends. Compliance issues were discussed. Jaenen Leger feels that she would not  have any difficulty following through with medical  requests. Janeen Leger feels that she does have good knowledge of the medical  procedure under consideration. III.   MENTAL HEALTH STATUS:    Mental Status Exam: appearance:  appropriately dressed, behavior:  normal, attitude:  cooperative, speech:  appropriate, mood:  euthymic, affect:  congruent with mood, thought content:  no evidence of psychosis, thought process:  logical and coherent, orientation:  oriented in all spheres, memory:  recent:  good and remote:  good, insight:  good , judgment:  good  and cognitive:  intact and intelligent    Janeen Leger has no history of suicidal attempts. She feels that she is at  no risk at the present time. Protective factors are It would never even cross my mind\"      IV:  SUMMARY/CONCLUSION:  Based on the results of the present psychological evaluation, Wanda Simpson appears to be a reasonable candidate for the medical procedure under consideration. She is not showing any strong significant psychological counter indicators at the present time. She denies any  suicidal risks. She has strong social support. She also feels she has  good information about the medical procedure under consideration and can  make a reasonable informed choice. Start time: 1:00 pm         End time: 1:49 pm    Visit Time: 52 MIN     It should be noted, however, that the present psychological report depends  primarily on the veracity of the information provided by the patient. Sincerely;           GIA Hopper,Haylies, [facility]  [facility]

## 2020-11-12 ENCOUNTER — VIRTUAL VISIT (OUTPATIENT)
Dept: PAIN MANAGEMENT | Age: 50
End: 2020-11-12
Payer: COMMERCIAL

## 2020-11-12 PROCEDURE — 99214 OFFICE O/P EST MOD 30 MIN: CPT | Performed by: ANESTHESIOLOGY

## 2020-11-12 NOTE — PROGRESS NOTES
relief     Pain is unchanged. Change in quality of symptoms:no. Pain causes significant functional limitations. She has been evaluated by psychology for SCS trial  On 10/16/2020- reviewed the notes- states \" Raul Hodge appears to be a reasonable candidate for the medical procedure under consideration\"     Previous treatments:   Physical Therapy : yes, continues HEP      Chiropractic treatment: no     Medications: - NSAID's : yes                        - Membrane stabilizers : yes - Gabapentin- did not help, Lyrica.                      - Opioids : no chronic use.                       - Adjuvants or Others : yes, - Cymbalta. Local compound cream.     TENS Unit: yes,     Surgeries: Yes- ankle surgery/ neuroma excision, I & D etc.    Interventions; Yes: Sympathetic block     She has not been on anticoagulation medications no.     She has not been on herbal supplements.       She is diabetic.     H/O Smoking: denies  H/O alcohol abuse : denies  H/O Illicit drug use : denies     Employment: disability     Imaging:   MRI of right ankle: 12/2019:    IMPRESSION:  Residual osteochondral injury to the medial talar dome, associated   with the presumed postsurgical change in the neck of the talus. Thickening of the anterior talofibular ligament also likely reflects   prior surgical repair. Tendinous and ligament structures are   otherwise grossly intact. No enhancing lesions.     MRI ANKLE [facility] RT3/23/2018  [facility]  Result Impression   IMPRESSION:    1.  SEVERE LATERAL ANKLE SPRAIN WITH INJURIES OF THE ANTERIOR TALOFIBULAR   AND CALCANEOFIBULAR LIGAMENTS.  THERE IS A BONE CONTUSION OF THE MEDIAL   ASPECT OF THE TALAR DOME. 2.  BONE CONTUSION OF THE CUBOID BONE. 3.  RIGHT ANKLE EFFUSION.       Xray of the right ankle: 2/2018:      Impression   A tiny bony fragment from the medial malleolus which could represent a   small avulsion injury.  No other displaced fractures are identified.       Soft tissue swelling                                            Potential Aberrant Drug-Related Behavior:no       Urine Drug Screening:no     OARRS report[de-identified]  Yes- reviewed: 8/27/2020 11/12/2020: Consistent    Past Medical History: Reviewed  Past Medical History:   Diagnosis Date    Diabetes mellitus ([address]     Hyperlipidemia     Hypertension     Nerve pain     right foot    Obese     PONV (postoperative nausea and vomiting)     Seasonal allergies          Past Surgical History: Reviewed :  Past Surgical History:   Procedure Laterality Date    ANESTHESIA NERVE BLOCK Right 6/18/2020    RIGHT LUMBAR SYMPATHETIC BLOCK UNDER FLUORO performed by Bruna Ferguson MD at [address]  years ago    removal cysts    NEUROMA SURGERY Right     done x 3 / last one 2/2019    SHOULDER SURGERY Left     rotator cuff done x 3 / last one 2012         Home Medications: Reviewed    Allergies: Reviewed     Social History: Reviewed     REVIEW OF SYSTEMS:     Marylene Bandy denies fever/chills, chest pain, shortness of breath, new bowel or bladder complaints. All other review of systems was negative. PHYSICAL EXAMINATION:      General:       A & O x3    HEENT:    Head:normocephalic and atraumatic    Lungs:    Breathing:Appears normal    Cervical spine:    Inspection:normal  Range of motion:normal     Thoracic spine:    Range of motion:normal     Lumbar spine:    Range of motion:reduced    Extremities:    Right ankle/ foot:  Scar form the prior surgery - healed well  Ankle ROM reduced  Some swelling noted     Neurological:     Motor:          No apparent weakness per patient           Dermatology:    Skin:no unusual rashes    1. Complex regional pain syndrome type 2 of right lower extremity      2. Neuralgia and neuritis      3. Chronic pain of right ankle      4. Chronic pain in right foot         H/o fall in 2018 following which she had right ankle injury / tendon injury. S/P surgery for the same.   Had developed neuroma and get medical care  2-Clean your hands often for atleast 20 seconds, avoid touching: Avoid touching your eyes, nose, and mouth with unwashed hands. 3-Seek medical attention: Seek prompt medical attention if your illness is worsening (e.g., difficulty breathing). Call you doctor first.  3-Wear a facemask if you are sick   4-Cover your coughs and sneezes           I affirm this is a Patient Initiated Episode with an established Patient who has not had a related appointment within my department in the past 7 days or scheduled within the next 24 hours.     Asa Alberto MD    CC:  Selena Hussein MD

## 2020-11-12 NOTE — PROGRESS NOTES
Sofie Simpson was read the following message We want to confirm that, for purposes of billing, this is a virtual visit with your provider for which we will submit a claim for reimbursement with your insurance company. You will be responsible for any copays, coinsurance amounts or other amounts not covered by your insurance company. If you do not accept this, unfortunately we will not be able to schedule a virtual visit with the provider. Do you accept?  Kandice Tobar responded YES

## 2021-01-11 ENCOUNTER — TELEPHONE (OUTPATIENT)
Dept: PAIN MANAGEMENT | Age: 51
End: 2021-01-11

## 2021-01-11 NOTE — TELEPHONE ENCOUNTER
Message left for Milagro Elder to call me back regarding scheduling spinal cord stimulator trial. She stated last year that she wanted to wait until end of January 2021 to schedule. This will actually be the second message I left. I asked her to call me back either way if she still wants to schedule or does not want to schedule at this time.

## 2021-02-09 ENCOUNTER — APPOINTMENT (OUTPATIENT)
Dept: GENERAL RADIOLOGY | Age: 51
End: 2021-02-09
Payer: COMMERCIAL

## 2021-02-09 ENCOUNTER — HOSPITAL ENCOUNTER (EMERGENCY)
Age: 51
Discharge: HOME | End: 2021-02-09
Attending: EMERGENCY MEDICINE
Payer: COMMERCIAL

## 2021-02-09 VITALS
HEIGHT: 65 IN | TEMPERATURE: 97.5 F | HEART RATE: 98 BPM | RESPIRATION RATE: 16 BRPM | BODY MASS INDEX: 47.48 KG/M2 | WEIGHT: 285 LBS | SYSTOLIC BLOOD PRESSURE: 126 MMHG | OXYGEN SATURATION: 98 % | DIASTOLIC BLOOD PRESSURE: 76 MMHG

## 2021-02-09 DIAGNOSIS — J18.9 PNEUMONIA DUE TO ORGANISM: ICD-10-CM

## 2021-02-09 DIAGNOSIS — R73.9 HYPERGLYCEMIA: ICD-10-CM

## 2021-02-09 DIAGNOSIS — R07.9 CHEST PAIN, UNSPECIFIED TYPE: Primary | ICD-10-CM

## 2021-02-09 LAB
ALBUMIN SERPL-MCNC: 3.7 G/DL (ref 3.5–5.2)
ALP BLD-CCNC: 103 U/L (ref 35–104)
ALT SERPL-CCNC: 31 U/L (ref 0–32)
ANION GAP SERPL CALCULATED.3IONS-SCNC: 15 MMOL/L (ref 7–16)
AST SERPL-CCNC: 23 U/L (ref 0–31)
BACTERIA: ABNORMAL /HPF
BASOPHILS ABSOLUTE: 0.09 E9/L (ref 0–0.2)
BASOPHILS RELATIVE PERCENT: 0.9 % (ref 0–2)
BETA-HYDROXYBUTYRATE: 0.13 MMOL/L (ref 0.02–0.27)
BILIRUB SERPL-MCNC: <0.2 MG/DL (ref 0–1.2)
BILIRUBIN URINE: NEGATIVE
BLOOD, URINE: ABNORMAL
BUN BLDV-MCNC: 20 MG/DL (ref 6–20)
CALCIUM SERPL-MCNC: 9 MG/DL (ref 8.6–10.2)
CHLORIDE BLD-SCNC: 103 MMOL/L (ref 98–107)
CLARITY: CLEAR
CO2: 19 MMOL/L (ref 22–29)
COLOR: YELLOW
CREAT SERPL-MCNC: 0.8 MG/DL (ref 0.5–1)
EOSINOPHILS ABSOLUTE: 0.28 E9/L (ref 0.05–0.5)
EOSINOPHILS RELATIVE PERCENT: 2.8 % (ref 0–6)
GFR AFRICAN AMERICAN: >60
GFR NON-AFRICAN AMERICAN: >60 ML/MIN/1.73
GLUCOSE BLD-MCNC: 279 MG/DL (ref 74–99)
GLUCOSE URINE: >=1000 MG/DL
HCT VFR BLD CALC: 38.3 % (ref 34–48)
HEMOGLOBIN: 12.8 G/DL (ref 11.5–15.5)
IMMATURE GRANULOCYTES #: 0.18 E9/L
IMMATURE GRANULOCYTES %: 1.8 % (ref 0–5)
KETONES, URINE: NEGATIVE MG/DL
LEUKOCYTE ESTERASE, URINE: NEGATIVE
LIPASE: 67 U/L (ref 13–60)
LYMPHOCYTES ABSOLUTE: 1.94 E9/L (ref 1.5–4)
LYMPHOCYTES RELATIVE PERCENT: 19.5 % (ref 20–42)
MAGNESIUM: 1.7 MG/DL (ref 1.6–2.6)
MCH RBC QN AUTO: 27.5 PG (ref 26–35)
MCHC RBC AUTO-ENTMCNC: 33.4 % (ref 32–34.5)
MCV RBC AUTO: 82.4 FL (ref 80–99.9)
METER GLUCOSE: 283 MG/DL (ref 74–99)
MONOCYTES ABSOLUTE: 0.49 E9/L (ref 0.1–0.95)
MONOCYTES RELATIVE PERCENT: 4.9 % (ref 2–12)
NEUTROPHILS ABSOLUTE: 6.96 E9/L (ref 1.8–7.3)
NEUTROPHILS RELATIVE PERCENT: 70.1 % (ref 43–80)
NITRITE, URINE: NEGATIVE
PDW BLD-RTO: 13.7 FL (ref 11.5–15)
PH UA: 6.5 (ref 5–9)
PH VENOUS: 7.37 (ref 7.35–7.45)
PLATELET # BLD: 260 E9/L (ref 130–450)
PMV BLD AUTO: 9.9 FL (ref 7–12)
POTASSIUM SERPL-SCNC: 4.2 MMOL/L (ref 3.5–5)
PRO-BNP: 56 PG/ML (ref 0–125)
PROTEIN UA: 100 MG/DL
RBC # BLD: 4.65 E12/L (ref 3.5–5.5)
RBC UA: ABNORMAL /HPF (ref 0–2)
SODIUM BLD-SCNC: 137 MMOL/L (ref 132–146)
SPECIFIC GRAVITY UA: 1.02 (ref 1–1.03)
TOTAL PROTEIN: 7.3 G/DL (ref 6.4–8.3)
TROPONIN: <0.01 NG/ML (ref 0–0.03)
TROPONIN: <0.01 NG/ML (ref 0–0.03)
UROBILINOGEN, URINE: 0.2 E.U./DL
WBC # BLD: 9.9 E9/L (ref 4.5–11.5)
WBC UA: ABNORMAL /HPF (ref 0–5)

## 2021-02-09 PROCEDURE — 71046 X-RAY EXAM CHEST 2 VIEWS: CPT

## 2021-02-09 PROCEDURE — 84484 ASSAY OF TROPONIN QUANT: CPT

## 2021-02-09 PROCEDURE — 99285 EMERGENCY DEPT VISIT HI MDM: CPT

## 2021-02-09 PROCEDURE — 36415 COLL VENOUS BLD VENIPUNCTURE: CPT

## 2021-02-09 PROCEDURE — U0003 INFECTIOUS AGENT DETECTION BY NUCLEIC ACID (DNA OR RNA); SEVERE ACUTE RESPIRATORY SYNDROME CORONAVIRUS 2 (SARS-COV-2) (CORONAVIRUS DISEASE [COVID-19]), AMPLIFIED PROBE TECHNIQUE, MAKING USE OF HIGH THROUGHPUT TECHNOLOGIES AS DESCRIBED BY CMS-2020-01-R: HCPCS

## 2021-02-09 PROCEDURE — 2580000003 HC RX 258: Performed by: EMERGENCY MEDICINE

## 2021-02-09 PROCEDURE — 93005 ELECTROCARDIOGRAM TRACING: CPT | Performed by: EMERGENCY MEDICINE

## 2021-02-09 PROCEDURE — 6370000000 HC RX 637 (ALT 250 FOR IP): Performed by: EMERGENCY MEDICINE

## 2021-02-09 RX ORDER — DOXYCYCLINE HYCLATE 100 MG/1
100 CAPSULE ORAL ONCE
Status: COMPLETED | OUTPATIENT
Start: 2021-02-09 | End: 2021-02-09

## 2021-02-09 RX ORDER — 0.9 % SODIUM CHLORIDE 0.9 %
1000 INTRAVENOUS SOLUTION INTRAVENOUS ONCE
Status: COMPLETED | OUTPATIENT
Start: 2021-02-09 | End: 2021-02-09

## 2021-02-09 RX ORDER — DOXYCYCLINE HYCLATE 100 MG
100 TABLET ORAL 2 TIMES DAILY
Qty: 14 TABLET | Refills: 0 | Status: SHIPPED | OUTPATIENT
Start: 2021-02-09 | End: 2021-02-16

## 2021-02-09 RX ADMIN — SODIUM CHLORIDE 1000 ML: 9 INJECTION, SOLUTION INTRAVENOUS at 17:48

## 2021-02-09 RX ADMIN — DOXYCYCLINE HYCLATE 100 MG: 100 CAPSULE ORAL at 22:47

## 2021-02-09 ASSESSMENT — ENCOUNTER SYMPTOMS
RHINORRHEA: 0
VOMITING: 0
BACK PAIN: 0
NAUSEA: 0
COLOR CHANGE: 0
BLOOD IN STOOL: 0
COUGH: 1
ABDOMINAL PAIN: 0
SHORTNESS OF BREATH: 1

## 2021-02-09 ASSESSMENT — PAIN DESCRIPTION - ONSET: ONSET: ON-GOING

## 2021-02-09 ASSESSMENT — PAIN DESCRIPTION - PROGRESSION: CLINICAL_PROGRESSION: RESOLVED

## 2021-02-09 ASSESSMENT — PAIN DESCRIPTION - FREQUENCY
FREQUENCY: INTERMITTENT
FREQUENCY: INTERMITTENT

## 2021-02-09 ASSESSMENT — PAIN DESCRIPTION - LOCATION: LOCATION: CHEST

## 2021-02-09 ASSESSMENT — PAIN SCALES - GENERAL: PAINLEVEL_OUTOF10: 2

## 2021-02-09 NOTE — ED PROVIDER NOTES
ED PROVIDER NOTE    Chief Complaint   Patient presents with    Chest Pain     intermittent x 1 month.  Hyperglycemia     346 at home. took insulin @ 1600       HPI:  2/9/21,   Time: 5:42 PM HUGO Steve is a 45 y.o. female presenting to the ED for chest pain and elevated BG. Chest pain ongoing intermittently x 2 months, episodes sometimes during exertion, sometimes at rest. Associated shortness of breath. No nausea/vomiting/diaphoresis. No orthopnea or leg swelling. Mild nonproductive cough. Last night had episode of heart racing and palpitations while in bed at rest. BG and BP have been elevated the past couple weeks despite adherence to home meds. No fever, chills, abdominal pain, diarrhea. Normal PO intake and urine output. Chart review: hx of DM, HTN, HLD. Admitted in 2019 for DKA    Review of Systems:     Review of Systems   Constitutional: Negative for appetite change, chills and fever. HENT: Negative for congestion and rhinorrhea. Eyes: Negative for visual disturbance. Respiratory: Positive for cough and shortness of breath. Cardiovascular: Positive for chest pain and palpitations. Gastrointestinal: Negative for abdominal pain, blood in stool, nausea and vomiting. Endocrine: Positive for polyuria. Genitourinary: Positive for frequency. Negative for decreased urine volume, difficulty urinating, dysuria, hematuria and urgency. Musculoskeletal: Negative for back pain and neck pain. Skin: Negative for color change.    Neurological: Negative for dizziness, syncope, weakness, light-headedness, numbness and headaches.         --------------------------------------------- PAST HISTORY ---------------------------------------------  Past Medical History:   Past Medical History:   Diagnosis Date    [facility]     Hyperlipidemia     Hypertension     Nerve pain     right foot    Obese     PONV (postoperative nausea and vomiting)     Seasonal allergies        Past Surgical History:   Past Surgical History:   Procedure Laterality Date    ANESTHESIA NERVE BLOCK Right 6/18/2020    RIGHT LUMBAR SYMPATHETIC BLOCK UNDER FLUORO performed by Jerrell Putnam MD at [address]  years ago    removal cysts    NEUROMA SURGERY Right     done x 3 / last one 2/2019    SHOULDER SURGERY Left     rotator cuff done x 3 / last one 2012       Social History:   Social History     Socioeconomic History    Marital status:      Spouse name: Not on file    Number of children: Not on file    Years of education: Not on file    Highest education level: Not on file   Occupational History    Not on file   Social Needs    Financial resource strain: Not on file    Food insecurity     Worry: Not on file     Inability: Not on file   [employer] needs     Medical: Not on file     Non-medical: Not on file   Tobacco Use    Smoking status: Former Smoker     Packs/day: 1.00     Years: 12.00     Pack years: 12.00     Types: Cigarettes     Quit date: 6/15/2005     Years since quitting: 15.6    Smokeless tobacco: Never Used   Substance and Sexual Activity    Alcohol use: Yes     Comment: rarely    Drug use: Never    Sexual activity: Not on file   Lifestyle    Physical activity     Days per week: Not on file     Minutes per session: Not on file    Stress: Not on file   Relationships    Social connections     Talks on phone: Not on file     Gets together: Not on file     Attends [employer] service: Not on file     Active member of club or organization: Not on file     Attends meetings of clubs or organizations: Not on file     Relationship status: Not on file    Intimate partner violence     Fear of current or ex partner: Not on file     Emotionally abused: Not on file     Physically abused: Not on file     Forced sexual activity: Not on file   Other Topics Concern    Not on file   Social History Narrative    Not on file       Family History:   Family History   Problem Relation Age of Onset    Heart Attack Mother     Heart Attack Father 48    Heart Attack Maternal Grandmother     Heart Attack Other     Mult Sclerosis Sister        The patients home medications have been reviewed. Allergies:   No Known Allergies        ---------------------------------------------------PHYSICAL EXAM--------------------------------------    BP (!) 147/88   Pulse 102   Temp 97.7 °F (36.5 °C) (Infrared)   Resp 16   Ht 5' 5\" (1.651 m)   Wt 285 lb (129.3 kg)   SpO2 98%   BMI 47.43 kg/m²     Physical Exam  Vitals signs and nursing note reviewed. Constitutional:       General: She is not in acute distress. Appearance: She is not toxic-appearing. HENT:      Mouth/Throat:      Mouth: Mucous membranes are dry. Eyes:      General: No scleral icterus. Extraocular Movements: Extraocular movements intact. Pupils: Pupils are equal, round, and reactive to light. Neck:      Musculoskeletal: Normal range of motion and neck supple. Comments: No JVD  Cardiovascular:      Rate and Rhythm: Regular rhythm. Tachycardia present. Pulses: Normal pulses. Heart sounds: Normal heart sounds. No murmur. Pulmonary:      Effort: Pulmonary effort is normal. No respiratory distress. Breath sounds: Normal breath sounds. No wheezing or rales. Abdominal:      General: There is no distension. Palpations: Abdomen is soft. Tenderness: There is no abdominal tenderness. Musculoskeletal: Normal range of motion. General: No swelling or tenderness. Skin:     General: Skin is warm and dry. Neurological:      Mental Status: She is alert and oriented to person, place, and time.       Comments: Strength 5/5 and sensation grossly intact to light touch and equal bilaterally throughout all extremities            -------------------------------------------------- RESULTS -------------------------------------------------  I have personally reviewed all laboratory and imaging results for this patient. Results are listed below. LABS:  Labs Reviewed   CBC WITH AUTO DIFFERENTIAL - Abnormal; Notable for the following components:       Result Value    Lymphocytes % 19.5 (*)     All other components within normal limits   COMPREHENSIVE METABOLIC PANEL - Abnormal; Notable for the following components:    CO2 19 (*)     Glucose 279 (*)     All other components within normal limits   LIPASE - Abnormal; Notable for the following components:    Lipase 67 (*)     All other components within normal limits   URINALYSIS WITH MICROSCOPIC - Abnormal; Notable for the following components:    Glucose, Ur >=1000 (*)     Blood, Urine SMALL (*)     Protein,  (*)     All other components within normal limits   POCT GLUCOSE - Abnormal; Notable for the following components:    Meter Glucose 283 (*)     All other components within normal limits   MAGNESIUM   TROPONIN   BRAIN NATRIURETIC PEPTIDE   BETA-HYDROXYBUTYRATE   PH, VENOUS   TROPONIN   COVID-19   COVID-19   COVID-19       RADIOLOGY:  Interpreted personally and by Radiologist.  XR CHEST (2 VW)   Final Result   Opacities are present in peripheral aspect of left lower lung field which   could indicate atelectasis or pneumonia. Continued follow-up may be helpful   for further evaluation. EKG: This EKG is signed and interpreted by the EP. Sinus tachycardia, vent rate 102bpm, normal axis and intervals, no acute injury pattern, no significant change compared w/ prior EKG on file      ------------------------- NURSING NOTES AND VITALS REVIEWED ---------------------------   The nursing notes within the ED encounter and vital signs as below have been reviewed by myself.   BP (!) 147/88   Pulse 102   Temp 97.7 °F (36.5 °C) (Infrared)   Resp 16   Ht 5' 5\" (1.651 m)   Wt 285 lb (129.3 kg)   SpO2 98%   BMI 47.43 kg/m²   Oxygen Saturation Interpretation: Normal    The patients available past medical records and past encounters were reviewed. ------------------------------ ED COURSE/MEDICAL DECISION MAKING----------------------  Medications   doxycycline hyclate (VIBRAMYCIN) capsule 100 mg (has no administration in time range)   0.9 % sodium chloride bolus (0 mLs Intravenous Stopped 21)     Counseling: The emergency provider has spoken with the patient and discussed todays results, in addition to providing specific details for the plan of care and counseling regarding the diagnosis and prognosis. Questions are answered at this time and they are agreeable with the plan. ED Course/Medical Decision Makin y.o. female here with chest pain and hyperglycemia. Non-toxic appearing, afebrile, hemodynamically stable, and in no acute distress. Breathing comfortably on room air without respiratory distress. Clinically euvolemic. Hyperglycemia w/o DKA. EKG and troponin x2 not c/w ACS. Low risk HEART score (3). CXR possible infiltrate. Does have recent cough and pain is left sided. Will treat for CAP w/ doxycycline. After discussion of findings and return precautions, patient agrees with plan for discharge and outpatient follow up with PCP.       --------------------------------- IMPRESSION AND DISPOSITION ---------------------------------    IMPRESSION  1. Chest pain, unspecified type    2. Hyperglycemia    3. Pneumonia due to organism        DISPOSITION  Disposition: Discharge to home  Patient condition is good    NOTE: This report was transcribed using voice recognition software.  Every effort was made to ensure accuracy; however, inadvertent computerized transcription errors may be present    Susan Modi MD  Attending Emergency Physician          Susan Modi MD  [phone]

## 2021-02-10 ENCOUNTER — CARE COORDINATION (OUTPATIENT)
Dept: CARE COORDINATION | Age: 51
End: 2021-02-10

## 2021-02-10 LAB
EKG ATRIAL RATE: 102 BPM
EKG P AXIS: 55 DEGREES
EKG P-R INTERVAL: 164 MS
EKG Q-T INTERVAL: 346 MS
EKG QRS DURATION: 82 MS
EKG QTC CALCULATION (BAZETT): 450 MS
EKG R AXIS: 24 DEGREES
EKG T AXIS: 41 DEGREES
EKG VENTRICULAR RATE: 102 BPM

## 2021-02-10 PROCEDURE — 93010 ELECTROCARDIOGRAM REPORT: CPT | Performed by: INTERNAL MEDICINE

## 2021-02-10 NOTE — CARE COORDINATION
Date/Time:  2/10/2021 1:12 PM  Attempted to reach patient by telephone. Call within 2 business days of discharge: Yes Left HIPPA compliant message requesting a return call. Will attempt to reach patient again.

## 2021-02-11 LAB
SARS-COV-2: NOT DETECTED
SOURCE: NORMAL

## 2021-02-11 NOTE — CARE COORDINATION
2nd attempt to contact pt regarding recent er visit without success.  hippa compliant message left advising pt to follow up with pcp regarding anything further

## 2021-07-28 ENCOUNTER — OFFICE VISIT (OUTPATIENT)
Dept: PAIN MANAGEMENT | Age: 51
End: 2021-07-28
Payer: MEDICARE

## 2021-07-28 VITALS
BODY MASS INDEX: 46.65 KG/M2 | WEIGHT: 280 LBS | TEMPERATURE: 98 F | HEIGHT: 65 IN | OXYGEN SATURATION: 96 % | SYSTOLIC BLOOD PRESSURE: 142 MMHG | RESPIRATION RATE: 16 BRPM | HEART RATE: 102 BPM | DIASTOLIC BLOOD PRESSURE: 84 MMHG

## 2021-07-28 DIAGNOSIS — M25.571 CHRONIC PAIN OF RIGHT ANKLE: ICD-10-CM

## 2021-07-28 DIAGNOSIS — G89.29 CHRONIC PAIN OF RIGHT ANKLE: ICD-10-CM

## 2021-07-28 PROCEDURE — G8427 DOCREV CUR MEDS BY ELIG CLIN: HCPCS | Performed by: ANESTHESIOLOGY

## 2021-07-28 PROCEDURE — 3017F COLORECTAL CA SCREEN DOC REV: CPT | Performed by: ANESTHESIOLOGY

## 2021-07-28 PROCEDURE — 99214 OFFICE O/P EST MOD 30 MIN: CPT | Performed by: ANESTHESIOLOGY

## 2021-07-28 PROCEDURE — G8417 CALC BMI ABV UP PARAM F/U: HCPCS | Performed by: ANESTHESIOLOGY

## 2021-07-28 PROCEDURE — 1036F TOBACCO NON-USER: CPT | Performed by: ANESTHESIOLOGY

## 2021-07-28 PROCEDURE — 99213 OFFICE O/P EST LOW 20 MIN: CPT | Performed by: ANESTHESIOLOGY

## 2021-07-28 NOTE — PROGRESS NOTES
[address] [facility]   [place], [address]  Dept: [phone]      Follow up Note      Young Castillo     Date of Visit:  7/28/2021    CC:  Patient presents for follow up   Chief Complaint   Patient presents with    Follow-up    Foot Pain     right    Ankle Pain     right       HPI:    H/o fall in Feb 2018- and had ankle injury including tendon injury. Underwent surgery for the same.     Developed neuroma and subsequently had surgeries for neuroma removal. Complicated by infections, I & D.     Multiple modalities of treatment including- Medications, injections, PT.     Pain over the right lateral ankle area, foot- mainly over the lateral 3 toes.     Has numbness over the right lateral foot and lateral 3 toes. Tingling. Numbness. Burning. Intermittent color changes and swelling +.     Has been evaluated at [facility] pain clinic and [facility] Pain/ PMR and had recommended SCS.     S/p right lumbar sympathetic block on 6/18/2020-did not produce significant long-lasting pain relief     Pain is unchanged. Change in quality of symptoms:no.       Pain causes significant functional limitations.     She has been evaluated by psychology for SCS trial  On 10/16/2020- reviewed the notes- states \" Yancy appears to be a reasonable candidate for the medical procedure under consideration\"     Nursing notes and details of the pain history reviewed. Please see intake notes for details. Previous treatments:   Physical Therapy : yes, continues HEP      Chiropractic treatment: no     Medications: - NSAID's : yes                        - Membrane stabilizers : yes - Gabapentin- did not help, Lyrica.                      - Opioids : no chronic use.                       - Adjuvants or Others : yes - Cymbalta. Local compound cream.     TENS Unit: yes,     Surgeries: Yes- ankle surgery/ neuroma excision, I & D etc.     Interventions;  Yes: Sympathetic block     She has not been on anticoagulation medications no.     She has not been on herbal supplements.       She is diabetic.     H/O Smoking: denies  H/O alcohol abuse : denies  H/O Illicit drug use : denies     Employment: disability     Imaging:   MRI of right ankle: 12/2019:    IMPRESSION:  Residual osteochondral injury to the medial talar dome, associated   with the presumed postsurgical change in the neck of the talus. Thickening of the anterior talofibular ligament also likely reflects   prior surgical repair. Tendinous and ligament structures are   otherwise grossly intact. No enhancing lesions.     MRI ANKLE WO [facility] RT3/23/2018  [facility]  Result Impression   IMPRESSION:    1.  SEVERE LATERAL ANKLE SPRAIN WITH INJURIES OF THE ANTERIOR TALOFIBULAR   AND CALCANEOFIBULAR LIGAMENTS.  THERE IS A BONE CONTUSION OF THE MEDIAL   ASPECT OF THE TALAR DOME. 2.  BONE CONTUSION OF THE CUBOID BONE. 3.  RIGHT ANKLE EFFUSION.       Xray of the right ankle: 2/2018:      Impression   A tiny bony fragment from the medial malleolus which could represent a   small avulsion injury. No other displaced fractures are identified.       Soft tissue swelling                                            Potential Aberrant Drug-Related Behavior:no       Urine Drug Screening:no     OARRS report[de-identified]  Yes- reviewed: today         Past Medical History:   Diagnosis Date    Diabetes mellitus ([address]     Hyperlipidemia     Hypertension     Nerve pain     right foot    Obese     PONV (postoperative nausea and vomiting)     Seasonal allergies        Past Surgical History:   Procedure Laterality Date    ANESTHESIA NERVE BLOCK Right 6/18/2020    RIGHT LUMBAR SYMPATHETIC BLOCK UNDER FLUORO performed by Rosangela Hanna MD at [facility]  years ago    removal cysts    NEUROMA SURGERY Right     done x 3 / last one 2/2019    SHOULDER SURGERY Left     rotator cuff done x 3 / last one 2012       Prior to Admission medications    Medication Sig Start Date End Date Taking? Authorizing Provider   DULoxetine (CYMBALTA) 60 MG extended release capsule Take 60 mg by mouth daily   Yes Historical Provider, MD   traMADol (ULTRAM) 50 MG tablet Take 50 mg by mouth every 8 hours as needed.     Yes Historical Provider, MD   Cholecalciferol (VITAMIN D3) 50 MCG ( UT) CAPS Take by mouth daily    Yes Historical Provider, MD   atorvastatin (LIPITOR) 40 MG tablet Take 60 mg by mouth daily  20  Yes Historical Provider, MD   insulin glargine (LANTUS SOLOSTAR) 100 UNIT/ML injection pen Inject 25 Units into the skin nightly  Patient taking differently: Inject 60 Units into the skin nightly  3/22/19  Yes Falguni Wei MD   insulin lispro (HUMALOG KWIKPEN) 100 UNIT/ML pen Inject 4 Units into the skin 3 times daily (before meals)  Patient taking differently: Inject 8 Units into the skin 3 times daily (before meals)  3/22/19  Yes Irma Grubbs MD   fluticasone (FLONASE) 50 MCG/ACT nasal spray 1 spray by Each Nare route daily 3/23/19  Yes Falguni Wei MD   Lancets MISC 1 each by Does not apply route daily 3/20/19  Yes Falguni Wei MD   Insulin Pen Needle 31G X 6 MM MISC 1 each by Does not apply route daily 3/20/19  Yes Falguni Wei MD   losartan (COZAAR) 25 MG tablet Take 50 mg by mouth daily    Yes Historical Provider, MD       No Known Allergies    Social History     Socioeconomic History    Marital status:      Spouse name: Not on file    Number of children: Not on file    Years of education: Not on file    Highest education level: Not on file   Occupational History    Not on file   Tobacco Use    Smoking status: Former Smoker     Packs/day: 1.00     Years: 12.00     Pack years: 12.00     Types: Cigarettes     Quit date: 6/15/2005     Years since quittin.1    Smokeless tobacco: Never Used   Vaping Use    Vaping Use: Never used   Substance and Sexual Activity    Alcohol use: Yes     Comment: rarely    Drug use: Never    Sexual activity: Not on file   Other Topics Concern    Not on file   Social History Narrative    Not on file     Social Determinants of Health     Financial Resource Strain:     Difficulty of Paying Living Expenses:    Food Insecurity:     Worried About Running Out of Food in the Last Year:     [address] in the Last Year:    Transportation Needs:     Lack of Transportation (Medical):  Lack of Transportation (Non-Medical):    Physical Activity:     Days of Exercise per Week:     Minutes of Exercise per Session:    Stress:     Feeling of Stress :    Social Connections:     Frequency of Communication with Friends and Family:     Frequency of Social Gatherings with Friends and Family:     Attends Taoist Services:     Active Member of Clubs or Organizations:     Attends Club or Organization Meetings:     Marital Status:    Intimate Partner Violence:     Fear of Current or Ex-Partner:     Emotionally Abused:     Physically Abused:     Sexually Abused:        Family History   Problem Relation Age of Onset    Heart Attack Mother     Heart Attack Father 48    Heart Attack Maternal Grandmother     Heart Attack Other     Mult Sclerosis Sister        REVIEW OF SYSTEMS:     Clearance Orf denies fever/chills, chest pain, shortness of breath, new bowel or bladder complaints. All other review of systems was negative.     PHYSICAL EXAMINATION:      BP (!) 142/84   Pulse 102   Temp 98 °F (36.7 °C) (Infrared)   Resp 16   Ht 5' 5\" (1.651 m)   Wt 280 lb (127 kg)   SpO2 96%   BMI 46.59 kg/m²   General:       General appearance:  Pleasant and well-hydrated, in no distress and A & O x 3  Build:Obese  Function: Rises from seated position easily and Moves about room with difficulty     HEENT:     Head:normocephalic, atraumatic   Lungs:     Breathing:normal breathing pattern     Abdomen:     Shape:non-distended and normal     Cervical spine:     Inspection:normal  Range of motion:Normal flexion, extension, rotation bilaterally and is not painful.     Thoracic spine:                Range of motion:normal in flexion, extension rotation bilateral and is not painful.     Lumbar spine:     Range of motion: Decreased, flexion Decreased, Lateral bending, extension and rotation bilaterally reduced is not painful.     Musculoskeletal:     Shoulder ROM -appears intact   Extremities:     Tremors:None bilaterally upper and lower     Right ankle/ foot:  Scar form the prior surgery - healed well  Ankle ROM reduced  Some swelling noted   Dysesthesia/ allodynia + right distal LE. Assessment/Plan:    1. Complex regional pain syndrome type 2 of right lower extremity      2. Neuralgia and neuritis      3. Chronic pain of right ankle      4. Chronic pain in right foot         H/o fall in 2018 following which she had right ankle injury / tendon injury. S/P surgery for the same. Had developed neuroma and subsequently had surgeries for neuroma treatment- complicated by infection for which she had undergone I & D.     Having pain mainly over the right lateral ankle, foot, lateral three toes and numbness. Intermittent swelling and color changes +.     Failed multiple modalities of treatment including meds/ PT/ local injection, Lumbar sympathetic block etc.     Features of neuropathic pain/ CRPS -type 2.      Considering ongoing significant pain with moderate functional limitation, failed multiple modalities of conservative treatment including therapy medications and interventional procedures she would be an appropriate candidate to proceed with spinal cord stimulation trial.      She has completed Psychological evaluation eval for SCS trial: No psychological contraindications and  is a good candidate for SCS trial.     Detailed discussion about the trial process done. RBA discussed.      Also explained the permanent implant process if successful trial. All the questions were answered. Patient would like to proceed with the SCS trial.      Continue HEP/PT as tolerated.     Counseling: reg regular HEP, weight loss. > 30 mins spent for the visit with significant time spent discussing the SCS trial process, care during the trial, expectation and possible outcome and also discussed about SCS implant process briefly/ counseling/coordination of care, documentation and review of records.     Leobardo Nichols MD    CC:  Markie Briceno MD

## 2021-07-28 NOTE — PROGRESS NOTES
Do you currently have any of the following:    Fever: No  Headache:  No  Cough: No  Shortness of breath: No  Exposed to anyone with these symptoms: No         Ryan Umanzor presents to the [facility] on 7/28/2021. Nikko Matthew is complaining of pain right ankle and foot . The pain is constant. The pain is described as throbbing, sharp and burning. Pain is rated on her best day at a 7, on her worst day at a 10, and on average at a 8 on the VAS scale. She took her last dose of Tramadol . Any procedures since your last visit: No, with  % relief. Pacemaker or defibrillator: No managed by . She is not on NSAIDS and is not on anticoagulation medications to include none and is managed by . Medication Contract and Consent for Opioid Use Documents Filed      No documents found                BP (!) 142/84   Pulse 102   Temp 98 °F (36.7 °C) (Infrared)   Resp 16   Ht 5' 5\" (1.651 m)   Wt 280 lb (127 kg)   SpO2 96%   BMI 46.59 kg/m²      No LMP recorded. Patient has had an injection.

## 2021-08-25 ENCOUNTER — TELEPHONE (OUTPATIENT)
Dept: PAIN MANAGEMENT | Age: 51
End: 2021-08-25

## 2021-08-25 NOTE — TELEPHONE ENCOUNTER
SCS Trial - Medicare is PRIMARY : BCBSMI is secondary and will  costs of whatever medicare does not (rabia Rdz Ref #[id number])

## 2021-09-01 RX ORDER — SEMAGLUTIDE 1.34 MG/ML
0.5 INJECTION, SOLUTION SUBCUTANEOUS WEEKLY
COMMUNITY

## 2021-09-01 NOTE — PROGRESS NOTES
[facility] PRE-ADMISSION TESTING INSTRUCTIONS    The Preadmission Testing patient is instructed accordingly using the following criteria (check applicable):    ARRIVAL INSTRUCTIONS:  [x] Parking the day of Surgery is located in the Main Entrance lot. Upon entering the door, make an immediate right to the surgery reception desk    [x] Bring photo ID and insurance card    [] Bring in a copy of Living will or Durable Power of  papers. [x] Please be sure to arrange for responsible adult to provide transportation to and from the hospital    [x] Please arrange for responsible adult to be with you for the 24 hour period post procedure due to having anesthesia      GENERAL INSTRUCTIONS:    [x] Nothing by mouth after midnight, including gum, candy, mints or water    [x] You may brush your teeth, but do not swallow any water    [x] Take medications as instructed with 1-2 oz of water    [] Stop herbal supplements and vitamins 5 days prior to procedure    [] Follow preop dosing of blood thinners per physician instructions    [x] Take 1/2 dose of evening insulin, but no insulin after midnight    [x] No oral diabetic medications after midnight    [x] If diabetic and have low blood sugar or feel symptomatic, take 1-2oz apple juice only    [] Bring inhalers day of surgery    [] Bring C-PAP/ Bi-Pap day of surgery    [] Bring urine specimen day of surgery    [x] Shower or bath with soap, lather and rinse well, AM of Surgery, no lotion, powders or creams    [] Follow bowel prep as instructed per surgeon    [x] No tobacco products within 24 hours of surgery     [x] No alcohol or illegal drug use within 24 hours of surgery.     [x] Jewelry, body piercing's, eyeglasses, contact lenses and dentures are not permitted into surgery (bring cases)      [x] Please do not wear any nail polish, make up or hair products on the day of surgery    [x] You can expect a call the business day prior to procedure to notify you if your arrival time changes    [x] If you receive a survey after surgery we would greatly appreciate your comments    [] Parent/guardian of a minor must accompany their child and remain on the premises  the entire time they are under our care     [] Pediatric patients may bring favorite toy, blanket or comfort item with them    [] A caregiver or family member must remain with the patient during their stay if they are mentally handicapped, have dementia, disoriented or unable to use a call light or would be a safety concern if left unattended    [x] Please notify surgeon if you develop any illness between now and time of surgery (cold, cough, sore throat, fever, nausea, vomiting) or any signs of infections  including skin, wounds, and dental.    [x]  The Outpatient Pharmacy is available to fill your prescription here on your day of surgery, ask your preop nurse for details    [x] Other instructions: wear loose, comfortable clothing    EDUCATIONAL MATERIALS PROVIDED:    [] PAT Preoperative Education Packet/Booklet     [] Medication List    [] Transfusion bracelet applied with instructions    [] Shower with soap, lather and rinse well, and use CHG wipes provided the evening before surgery as instructed    [] Incentive spirometer with instructions

## 2021-09-01 NOTE — PROGRESS NOTES
Have you been tested for COVID  No       vaccinated   Have you been told you were positive for COVID No  Have you had any known exposure to someone that is positive for COVID No  Do you have a cough                   No              Do you have shortness of breath No                 Do you have a sore throat            No                Are you having chills                    No                Are you having muscle aches. No                    Please come to the hospital wearing a mask and have your significant other wear a mask as well. Both of you should check your temperature before leaving to come here,  if it is 100 or higher please call [phone] for instruction.

## 2021-09-02 ENCOUNTER — ANESTHESIA (OUTPATIENT)
Dept: OPERATING ROOM | Age: 51
End: 2021-09-02
Payer: MEDICARE

## 2021-09-02 ENCOUNTER — HOSPITAL ENCOUNTER (OUTPATIENT)
Dept: GENERAL RADIOLOGY | Age: 51
Setting detail: OUTPATIENT SURGERY
Discharge: HOME | End: 2021-09-04
Attending: ANESTHESIOLOGY
Payer: MEDICARE

## 2021-09-02 ENCOUNTER — ANESTHESIA EVENT (OUTPATIENT)
Dept: OPERATING ROOM | Age: 51
End: 2021-09-02
Payer: MEDICARE

## 2021-09-02 ENCOUNTER — HOSPITAL ENCOUNTER (OUTPATIENT)
Age: 51
Setting detail: OUTPATIENT SURGERY
Discharge: HOME | End: 2021-09-02
Attending: ANESTHESIOLOGY | Admitting: ANESTHESIOLOGY
Payer: MEDICARE

## 2021-09-02 VITALS
TEMPERATURE: 97.2 F | WEIGHT: 285 LBS | RESPIRATION RATE: 16 BRPM | HEIGHT: 65 IN | DIASTOLIC BLOOD PRESSURE: 72 MMHG | BODY MASS INDEX: 47.48 KG/M2 | HEART RATE: 90 BPM | OXYGEN SATURATION: 99 % | SYSTOLIC BLOOD PRESSURE: 148 MMHG

## 2021-09-02 VITALS — OXYGEN SATURATION: 98 % | SYSTOLIC BLOOD PRESSURE: 159 MMHG | DIASTOLIC BLOOD PRESSURE: 76 MMHG

## 2021-09-02 DIAGNOSIS — G57.71 COMPLEX REGIONAL PAIN SYNDROME TYPE 2 OF RIGHT LOWER EXTREMITY: Primary | ICD-10-CM

## 2021-09-02 DIAGNOSIS — R52 PAIN MANAGEMENT: ICD-10-CM

## 2021-09-02 LAB
HCG, URINE, POC: NEGATIVE
Lab: NORMAL
NEGATIVE QC PASS/FAIL: NORMAL
POSITIVE QC PASS/FAIL: NORMAL

## 2021-09-02 PROCEDURE — 7100000011 HC PHASE II RECOVERY - ADDTL 15 MIN: Performed by: ANESTHESIOLOGY

## 2021-09-02 PROCEDURE — 63650 IMPLANT NEUROELECTRODES: CPT | Performed by: ANESTHESIOLOGY

## 2021-09-02 PROCEDURE — 3700000000 HC ANESTHESIA ATTENDED CARE: Performed by: ANESTHESIOLOGY

## 2021-09-02 PROCEDURE — 2709999900 HC NON-CHARGEABLE SUPPLY: Performed by: ANESTHESIOLOGY

## 2021-09-02 PROCEDURE — 95972 ALYS CPLX SP/PN NPGT W/PRGRM: CPT | Performed by: ANESTHESIOLOGY

## 2021-09-02 PROCEDURE — 6360000002 HC RX W HCPCS: Performed by: PHYSICIAN ASSISTANT

## 2021-09-02 PROCEDURE — 6360000002 HC RX W HCPCS: Performed by: NURSE ANESTHETIST, CERTIFIED REGISTERED

## 2021-09-02 PROCEDURE — 2780000010 HC IMPLANT OTHER: Performed by: ANESTHESIOLOGY

## 2021-09-02 PROCEDURE — 3600000002 HC SURGERY LEVEL 2 BASE: Performed by: ANESTHESIOLOGY

## 2021-09-02 PROCEDURE — 7100000010 HC PHASE II RECOVERY - FIRST 15 MIN: Performed by: ANESTHESIOLOGY

## 2021-09-02 PROCEDURE — 3700000001 HC ADD 15 MINUTES (ANESTHESIA): Performed by: ANESTHESIOLOGY

## 2021-09-02 PROCEDURE — 6360000002 HC RX W HCPCS

## 2021-09-02 PROCEDURE — 2580000003 HC RX 258: Performed by: NURSE ANESTHETIST, CERTIFIED REGISTERED

## 2021-09-02 PROCEDURE — C1778 LEAD, NEUROSTIMULATOR: HCPCS | Performed by: ANESTHESIOLOGY

## 2021-09-02 PROCEDURE — 3600000012 HC SURGERY LEVEL 2 ADDTL 15MIN: Performed by: ANESTHESIOLOGY

## 2021-09-02 PROCEDURE — 3209999900 FLUORO FOR SURGICAL PROCEDURES

## 2021-09-02 PROCEDURE — 2580000003 HC RX 258: Performed by: PHYSICIAN ASSISTANT

## 2021-09-02 PROCEDURE — 2500000003 HC RX 250 WO HCPCS: Performed by: ANESTHESIOLOGY

## 2021-09-02 DEVICE — TRIAL LEAD KIT, 50CM WITH 5MM SPACING
Type: IMPLANTABLE DEVICE | Site: BACK | Status: FUNCTIONAL
Brand: NEVRO®

## 2021-09-02 RX ORDER — FENTANYL CITRATE 50 UG/ML
INJECTION, SOLUTION INTRAMUSCULAR; INTRAVENOUS PRN
Status: DISCONTINUED | OUTPATIENT
Start: 2021-09-02 | End: 2021-09-02 | Stop reason: SDUPTHER

## 2021-09-02 RX ORDER — LIDOCAINE HYDROCHLORIDE 5 MG/ML
INJECTION, SOLUTION INFILTRATION; INTRAVENOUS PRN
Status: DISCONTINUED | OUTPATIENT
Start: 2021-09-02 | End: 2021-09-02 | Stop reason: ALTCHOICE

## 2021-09-02 RX ORDER — BUPIVACAINE HYDROCHLORIDE 5 MG/ML
INJECTION, SOLUTION EPIDURAL; INTRACAUDAL PRN
Status: DISCONTINUED | OUTPATIENT
Start: 2021-09-02 | End: 2021-09-02 | Stop reason: ALTCHOICE

## 2021-09-02 RX ORDER — OXYCODONE HYDROCHLORIDE AND ACETAMINOPHEN 5; 325 MG/1; MG/1
1 TABLET ORAL EVERY 8 HOURS PRN
Qty: 21 TABLET | Refills: 0 | Status: SHIPPED | OUTPATIENT
Start: 2021-09-02 | End: 2021-09-09

## 2021-09-02 RX ORDER — SODIUM CHLORIDE, SODIUM LACTATE, POTASSIUM CHLORIDE, CALCIUM CHLORIDE 600; 310; 30; 20 MG/100ML; MG/100ML; MG/100ML; MG/100ML
INJECTION, SOLUTION INTRAVENOUS CONTINUOUS
Status: DISCONTINUED | OUTPATIENT
Start: 2021-09-02 | End: 2021-09-02 | Stop reason: HOSPADM

## 2021-09-02 RX ORDER — CEPHALEXIN 500 MG/1
500 CAPSULE ORAL 3 TIMES DAILY
Qty: 30 CAPSULE | Refills: 0 | Status: SHIPPED | OUTPATIENT
Start: 2021-09-02 | End: 2021-09-12

## 2021-09-02 RX ORDER — SODIUM CHLORIDE 9 MG/ML
INJECTION, SOLUTION INTRAVENOUS CONTINUOUS PRN
Status: DISCONTINUED | OUTPATIENT
Start: 2021-09-02 | End: 2021-09-02 | Stop reason: SDUPTHER

## 2021-09-02 RX ORDER — MIDAZOLAM HYDROCHLORIDE 1 MG/ML
INJECTION INTRAMUSCULAR; INTRAVENOUS PRN
Status: DISCONTINUED | OUTPATIENT
Start: 2021-09-02 | End: 2021-09-02 | Stop reason: SDUPTHER

## 2021-09-02 RX ADMIN — FENTANYL CITRATE 25 MCG: 50 INJECTION, SOLUTION INTRAMUSCULAR; INTRAVENOUS at 10:16

## 2021-09-02 RX ADMIN — FENTANYL CITRATE 25 MCG: 50 INJECTION, SOLUTION INTRAMUSCULAR; INTRAVENOUS at 11:18

## 2021-09-02 RX ADMIN — MIDAZOLAM 2 MG: 1 INJECTION INTRAMUSCULAR; INTRAVENOUS at 10:56

## 2021-09-02 RX ADMIN — FENTANYL CITRATE 50 MCG: 50 INJECTION, SOLUTION INTRAMUSCULAR; INTRAVENOUS at 11:47

## 2021-09-02 RX ADMIN — FENTANYL CITRATE 100 MCG: 50 INJECTION, SOLUTION INTRAMUSCULAR; INTRAVENOUS at 10:57

## 2021-09-02 RX ADMIN — CEFAZOLIN 3000 MG: 10 INJECTION, POWDER, FOR SOLUTION INTRAVENOUS at 10:58

## 2021-09-02 RX ADMIN — SODIUM CHLORIDE: 9 INJECTION, SOLUTION INTRAVENOUS at 10:22

## 2021-09-02 ASSESSMENT — PAIN SCALES - GENERAL
PAINLEVEL_OUTOF10: 2

## 2021-09-02 ASSESSMENT — LIFESTYLE VARIABLES: SMOKING_STATUS: 0

## 2021-09-02 NOTE — ANESTHESIA PRE PROCEDURE
Department of Anesthesiology  Preprocedure Note       Name:  Milagro Elder   Age:  45 y.o.  :  1970                                          MRN:  [id number]         Date:  2021      Surgeon: Raghu Sheehan):  Stefanie Marino MD    Procedure: SPINAL CORD STIMULATOR TRIAL WITH Avi Madrid  (CPT 96504) (N/A )    Medications prior to admission:   Prior to Admission medications    Medication Sig Start Date End Date Taking? Authorizing Provider   Semaglutide,0.25 or 0.5MG/DOS, (OZEMPIC, 0.25 OR 0.5 MG/DOSE,) 2 MG/1.5ML SOPN Inject 0.5 mg into the skin once a week Takes every Thursday    Historical Provider, MD   DULoxetine (CYMBALTA) 60 MG extended release capsule Take 60 mg by mouth daily    Historical Provider, MD   traMADol (ULTRAM) 50 MG tablet Take 50 mg by mouth every 8 hours as needed.      Historical Provider, MD   Cholecalciferol (VITAMIN D3) 50 MCG ( UT) CAPS Take by mouth daily     Historical Provider, MD   atorvastatin (LIPITOR) 40 MG tablet Take 60 mg by mouth daily  20   Historical Provider, MD   insulin glargine (LANTUS SOLOSTAR) 100 UNIT/ML injection pen Inject 25 Units into the skin nightly  Patient taking differently: Inject 60 Units into the skin nightly  3/22/19   Irma Gurbbs MD   insulin lispro (HUMALOG KWIKPEN) 100 UNIT/ML pen Inject 4 Units into the skin 3 times daily (before meals)  Patient taking differently: Inject 8 Units into the skin 3 times daily (before meals)  3/22/19   Irma Grubbs MD   fluticasone (FLONASE) 50 MCG/ACT nasal spray 1 spray by Each Nare route daily 3/23/19   Presley Brown MD   Lancets MISC 1 each by Does not apply route daily 3/20/19   Irma Grubbs MD   Insulin Pen Needle 31G X 6 MM MISC 1 each by Does not apply route daily 3/20/19   Irma Grubbs MD   losartan (COZAAR) 25 MG tablet Take 50 mg by mouth daily     Historical Provider, MD       Current medications:    Current Outpatient Medications   Medication Sig Dispense Refill    Semaglutide,0.25 or 0.5MG/DOS, (OZEMPIC, 0.25 OR 0.5 MG/DOSE,) 2 MG/1.5ML SOPN Inject 0.5 mg into the skin once a week Takes every Thursday      DULoxetine (CYMBALTA) 60 MG extended release capsule Take 60 mg by mouth daily      traMADol (ULTRAM) 50 MG tablet Take 50 mg by mouth every 8 hours as needed.  Cholecalciferol (VITAMIN D3) 50 MCG (2000 UT) CAPS Take by mouth daily       atorvastatin (LIPITOR) 40 MG tablet Take 60 mg by mouth daily       insulin glargine (LANTUS SOLOSTAR) 100 UNIT/ML injection pen Inject 25 Units into the skin nightly (Patient taking differently: Inject 60 Units into the skin nightly ) 5 pen 3    insulin lispro (HUMALOG KWIKPEN) 100 UNIT/ML pen Inject 4 Units into the skin 3 times daily (before meals) (Patient taking differently: Inject 8 Units into the skin 3 times daily (before meals) ) 5 pen 3    fluticasone (FLONASE) 50 MCG/ACT nasal spray 1 spray by Each Nare route daily 1 Bottle 3    Lancets MISC 1 each by Does not apply route daily 100 each 3    Insulin Pen Needle 31G X 6 MM MISC 1 each by Does not apply route daily 100 each 3    losartan (COZAAR) 25 MG tablet Take 50 mg by mouth daily        No current facility-administered medications for this visit.        Allergies:  No Known Allergies    Problem List:    Patient Active Problem List   Diagnosis Code    Major depressive disorder, recurrent episode, moderate ([facility]) F33.1    Cellulitis L03.90    Type 2 diabetes mellitus with ketoacidosis without coma, without long-term current use of insulin ([facility]) E11.10    Chest pain R07.9    Chronic pain of right ankle M25.571, G89.29    Chronic pain in right foot M79.671, G89.29    Neuralgia and neuritis M79.2    Complex regional pain syndrome type 2 of right lower extremity G57.71       Past Medical History:        Diagnosis Date    [facility] 2012    basal cell scalp    [facility]     Hyperlipidemia     Hypertension     Nerve pain     right foot    Obese     PONV (postoperative nausea and vomiting)     Seasonal allergies        Past Surgical History:        Procedure Laterality Date    ANESTHESIA NERVE BLOCK Right 2020    RIGHT LUMBAR SYMPATHETIC BLOCK UNDER FLUORO performed by Katy Xiong MD at [address]  years ago    removal cysts    NEUROMA SURGERY Right     done x 3 / last one 2019    SHOULDER SURGERY Left     rotator cuff done x 3 / last one     SKIN CANCER EXCISION  2012    basal cell ca scalp excision       Social History:    Social History     Tobacco Use    Smoking status: Former Smoker     Packs/day: 1.00     Years: 12.00     Pack years: 12.00     Types: Cigarettes     Quit date: 6/15/2005     Years since quittin.2    Smokeless tobacco: Never Used   Substance Use Topics    Alcohol use: Yes     Comment: rarely                                Counseling given: Not Answered      Vital Signs (Current): There were no vitals filed for this visit.                                            BP Readings from Last 3 Encounters:   21 (!) 142/84   21 126/76   20 132/72       NPO Status:                                                                                 BMI:   Wt Readings from Last 3 Encounters:   21 280 lb (127 kg)   21 285 lb (129.3 kg)   20 260 lb (117.9 kg)     There is no height or weight on file to calculate BMI.    CBC:   Lab Results   Component Value Date    WBC 9.9 2021    RBC 4.65 2021    HGB 12.8 2021    HCT 38.3 2021    MCV 82.4 2021    RDW 13.7 2021     2021       CMP:   Lab Results   Component Value Date     2021    K 4.2 2021     2021    CO2 19 2021    BUN 20 2021    CREATININE 0.8 2021    GFRAA >60 2021    LABGLOM >60 2021    GLUCOSE 279 2021    PROT 7.3 2021    CALCIUM 9.0 2021    BILITOT <0.2 2021    ALKPHOS 103 2021    AST 23 2021 ALT 31 02/09/2021       POC Tests: No results for input(s): POCGLU, POCNA, POCK, POCCL, POCBUN, POCHEMO, POCHCT in the last 72 hours. Coags: No results found for: PROTIME, INR, APTT    HCG (If Applicable):   Lab Results   Component Value Date    PREGTESTUR NEGATIVE 06/18/2020        ABGs: No results found for: PHART, PO2ART, NWJ7OQW, GAG8TJD, BEART, B0XDJQTT     Type & Screen (If Applicable):  No results found for: LABABO, LABRH    Drug/Infectious Status (If Applicable):  No results found for: HIV, HEPCAB    COVID-19 Screening (If Applicable):   Lab Results   Component Value Date    COVID19 Not Detected 02/09/2021         Anesthesia Evaluation  Patient summary reviewed   history of anesthetic complications: PONV. Airway: Mallampati: II  TM distance: >3 FB   Neck ROM: full   Dental:          Pulmonary: breath sounds clear to auscultation      (-) not a current smoker                          ROS comment: Seasonal allergies  Former Smoker    Cardiovascular:    (+) hypertension:, hyperlipidemia        Rhythm: regular  Rate: normal           Beta Blocker:  Not on Beta Blocker         Neuro/Psych:   (+) neuromuscular disease (COMPLEX REGIONAL PAIN SYNDROME TYPE 2 RIGHT LOWER EXTREMITY):, depression/anxiety             GI/Hepatic/Renal:   (+) morbid obesity          Endo/Other:    (+) DiabetesType II DM, using insulin, . Abdominal:   (+) obese,           Vascular: negative vascular ROS. Other Findings:               Anesthesia Plan      MAC     ASA 3       Induction: intravenous. MIPS: Prophylactic antiemetics administered. Anesthetic plan and risks discussed with patient. Plan discussed with CRNA.                   Mak Dempsey MD   9/2/2021

## 2021-09-02 NOTE — H&P
Inject 4 Units into the skin 3 times daily (before meals)  Patient taking differently: Inject 8 Units into the skin 3 times daily (before meals)  3/22/19  Yes Irma Grubbs MD   fluticasone (FLONASE) 50 MCG/ACT nasal spray 1 spray by Each Nare route daily 3/23/19  Yes Falguni Fitzgerald MD   Lancets MISC 1 each by Does not apply route daily 3/20/19  Yes Falguni Fitzgerald MD   Insulin Pen Needle 31G X 6 MM MISC 1 each by Does not apply route daily 3/20/19  Yes Falguni Fitzgerald MD   losartan (COZAAR) 25 MG tablet Take 50 mg by mouth daily    Yes Historical Provider, MD       No Known Allergies    Social History     Socioeconomic History    Marital status:      Spouse name: Not on file    Number of children: Not on file    Years of education: Not on file    Highest education level: Not on file   Occupational History    Not on file   Tobacco Use    Smoking status: Former Smoker     Packs/day: 1.00     Years: 12.00     Pack years: 12.00     Types: Cigarettes     Quit date: 6/15/2005     Years since quittin.2    Smokeless tobacco: Never Used   Vaping Use    Vaping Use: Never used   Substance and Sexual Activity    Alcohol use: Yes     Comment: rarely    Drug use: Never    Sexual activity: Not on file   Other Topics Concern    Not on file   Social History Narrative    Not on file     Social Determinants of Health     Financial Resource Strain:     Difficulty of Paying Living Expenses:    Food Insecurity:     Worried About Running Out of Food in the Last Year:     [address] in the Last Year:    Transportation Needs:     Lack of Transportation (Medical):      Lack of Transportation (Non-Medical):    Physical Activity:     Days of Exercise per Week:     Minutes of Exercise per Session:    Stress:     Feeling of Stress :    Social Connections:     Frequency of Communication with Friends and Family:     Frequency of Social Gatherings with Friends and Family:     Attends Jain Services:     Active during their perioperative period and any recovery window from their procedure. The patient was made aware that king Covid-19  may worsen their prognosis for recovering from their procedure  and lend to a higher morbidity and/or mortality risk. All material risks, benefits, and reasonable alternatives including postponing the procedure were discussed. The patient does wish to proceed with the procedure at this time.     Rosangela Hanna MD

## 2021-09-02 NOTE — PROGRESS NOTES
Discharge instructions provided with good understanding. FRANKIE rep here and provided instructions to patient.

## 2021-09-02 NOTE — OP NOTE
Operative Note      Patient: J Luis Pan  YOB: 1970  MRN: [id number]    Date of Procedure: 2021    Pre-Op Diagnosis: COMPLEX REGIONAL PAIN SYNDROME OF THE RIGHT LOWER EXTREMITY, Neuralgia neuritis    Post-Op Diagnosis: Same       Procedure(s):  SPINAL CORD STIMULATOR TRIAL WITH NEVRO   With X 2 eight contact leads. Surgeon(s):  Medhat Cintron MD    Assistant:   * No surgical staff found *    Anesthesia: Monitor Anesthesia Care    Estimated Blood Loss (mL): Minimal    Complications: None    Specimens:   * No specimens in log *    Implants:  Implant Name Type Inv. Item Serial No.  Lot No. LRB No. Used Action   KIT TRL LD L50CM 5MM SPC FOR NEUROSTIM  KIT TRL LD L50CM 5MM SPC FOR NEUROSTIM  NEVRO EVY-WD [id number] N/A 1 Implanted   KIT TRL LD L50CM 5MM SPC FOR NEUROSTIM  KIT TRL LD L50CM 5MM SPC FOR NEUROSTIM  NEVRO EVY-WD [id number] N/A 1 Implanted         Drains: * No LDAs found *    Findings:   - NOTE:  Vertebral bodies were counted from L5 and also confirmed from counting from the top and it appears that the lowest rib is accessory.  - Good needle placement    - Lead location - left Top of T9 and right sided is Lowest contact at the bottom of T12. Detailed Description of Procedure:   2021    Patient: J Luis Pan  :  1970  Age:  45 y.o. Sex:  female     PRE-OPERATIVE DIAGNOSIS: Lumbar postlaminectomy syndrome. POST-OPERATIVE DIAGNOSIS: Same      PROCEDURE: Fluoroscopic guided spinal cord stimulator trial.    COMPANY: [employer]    SURGEON: Medhat Cintron MD    ANESTHESIA: MAC    ESTIMATED BLOOD LOSS: None.  ______________________________________________________________________    BRIEF HISTORY: J Luis Pan comes in today for spinal cord stimulator trial under fluoroscopic guidance. The potential complications of this procedure were discussed with her again today. She has elected to undergo the aforementioned procedure.     Karmen complete History & Physical were also wrapped in gauze and secured to the patient. Patient back was then cleansed and opsites were placed to cover the leads and the connectors, more programming was performed in the recovery area with the device representative. Disposition the patient tolerated the procedure well and there were no complications . Vital signs remained stable throughout the procedure. The patient was escorted to the recovery area where they remained until discharge and written discharge instructions for the procedure were given. Plan: [facility] will return to our pain management center as scheduled.      Bhavna Tapia MD    Electronically signed by Bhavna Tapia MD on 9/2/2021 at 12:23 PM

## 2021-09-02 NOTE — ANESTHESIA POSTPROCEDURE EVALUATION
Department of Anesthesiology  Postprocedure Note    Patient: Teja Jefferson  MRN: [id number]  YOB: 1970  Date of evaluation: 9/2/2021  Time:  1:36 PM     Procedure Summary     Date: 09/02/21 Room / Location: [facility] PROCEDURE ROOM 02 / [facility]    Anesthesia Start: 1056 Anesthesia Stop: 1207    Procedure: Idris  (CPT 90832) (N/A Back) Diagnosis: (COMPLEX REGIONAL PAIN SYNDROME OF THE RIGHT LOWER EXTREMITY)    Surgeons: Timo Mueller MD Responsible Provider: Susi Terry MD    Anesthesia Type: MAC ASA Status: 3          Anesthesia Type: MAC    Raffi Phase I: Raffi Score: 10    Raffi Phase II: Raffi Score: 10    Last vitals: Reviewed and per EMR flowsheets.        Anesthesia Post Evaluation    Patient location during evaluation: PACU  Patient participation: complete - patient participated  Level of consciousness: awake  Airway patency: patent  Nausea & Vomiting: no nausea and no vomiting  Complications: no  Cardiovascular status: hemodynamically stable  Respiratory status: acceptable  Hydration status: stable

## 2021-09-09 ENCOUNTER — OFFICE VISIT (OUTPATIENT)
Dept: PAIN MANAGEMENT | Age: 51
End: 2021-09-09
Payer: MEDICARE

## 2021-09-09 VITALS
SYSTOLIC BLOOD PRESSURE: 122 MMHG | RESPIRATION RATE: 16 BRPM | BODY MASS INDEX: 47.48 KG/M2 | DIASTOLIC BLOOD PRESSURE: 64 MMHG | OXYGEN SATURATION: 98 % | WEIGHT: 285 LBS | HEIGHT: 65 IN | HEART RATE: 94 BPM

## 2021-09-09 DIAGNOSIS — M25.571 CHRONIC PAIN OF RIGHT ANKLE: ICD-10-CM

## 2021-09-09 DIAGNOSIS — G89.29 CHRONIC PAIN IN RIGHT FOOT: ICD-10-CM

## 2021-09-09 DIAGNOSIS — M79.2 NEURALGIA AND NEURITIS: Primary | ICD-10-CM

## 2021-09-09 DIAGNOSIS — G89.29 CHRONIC PAIN OF RIGHT ANKLE: ICD-10-CM

## 2021-09-09 DIAGNOSIS — G57.71 COMPLEX REGIONAL PAIN SYNDROME TYPE 2 OF RIGHT LOWER EXTREMITY: ICD-10-CM

## 2021-09-09 DIAGNOSIS — M79.671 CHRONIC PAIN IN RIGHT FOOT: ICD-10-CM

## 2021-09-09 PROCEDURE — 99024 POSTOP FOLLOW-UP VISIT: CPT | Performed by: ANESTHESIOLOGY

## 2021-09-09 PROCEDURE — 99213 OFFICE O/P EST LOW 20 MIN: CPT | Performed by: ANESTHESIOLOGY

## 2021-09-09 RX ORDER — PREGABALIN 25 MG/1
25 CAPSULE ORAL 3 TIMES DAILY
Qty: 90 CAPSULE | Refills: 1 | Status: SHIPPED
Start: 2021-09-09 | End: 2021-11-11 | Stop reason: DRUGHIGH

## 2021-09-09 NOTE — PROGRESS NOTES
[address] [facility]   [place], [address] [facility]  Dept: [phone]      Follow up Note      Camille Li     Date of Visit:  9/9/2021    CC:  Patient presents for follow up   Chief Complaint   Patient presents with    Follow-up    Foot Pain     right foot       HPI:  Patient is a status post spinal cord summation trial lead placement. She is here for lead removal.  Patient states that she did not notice significant improvement in pain during the trial phase even multiple different programs. We tried to attempt a paresthesia stimulation but patient did not want to try paresthesia or low-frequency stimulation. Denies fever chills or night sweats. Nursing notes and details of the pain history reviewed. Please see intake notes for details. Past Medical History:   Diagnosis Date    Cancer [facility]) 2012    basal cell scalp    Diabetes mellitus ([facility] [address]     Hyperlipidemia     Hypertension     Nerve pain     right foot    Obese     PONV (postoperative nausea and vomiting)     Seasonal allergies        Past Surgical History:   Procedure Laterality Date    ANESTHESIA NERVE BLOCK Right 6/18/2020    RIGHT LUMBAR SYMPATHETIC BLOCK UNDER FLUORO performed by Joni Epley, MD at [facility]  years ago    removal cysts    NEUROMA SURGERY Right     done x 3 / last one 2/2019    PAIN MANAGEMENT PROCEDURE N/A 9/2/2021    SPINAL CORD STIMULATOR TRIAL WITH Chase Hughes  (CPT 48590) performed by Joni Epley, MD at [address] [place] [address] Left     rotator cuff done x 3 / last one 2012    SKIN CANCER EXCISION  2012    basal cell ca scalp excision       Prior to Admission medications    Medication Sig Start Date End Date Taking? Authorizing Provider   oxyCODONE-acetaminophen (PERCOCET) 5-325 MG per tablet Take 1 tablet by mouth every 8 hours as needed for Pain for up to 7 days.  Take lowest dose possible to manage pain 9/2/21 9/9/21 Yes Joni Epley, MD   cephALEXin (KEFLEX) 500 MG capsule Take 1 capsule by mouth 3 times daily for 10 days 9/2/21 9/12/21 Yes Stefanie Marino MD   Semaglutide,0.25 or 0.5MG/DOS, (OZEMPIC, 0.25 OR 0.5 MG/DOSE,) 2 MG/1.5ML SOPN Inject 0.5 mg into the skin once a week Takes every Thursday   Yes Historical Provider, MD   DULoxetine (CYMBALTA) 60 MG extended release capsule Take 60 mg by mouth daily   Yes Historical Provider, MD   traMADol (ULTRAM) 50 MG tablet Take 50 mg by mouth every 8 hours as needed.     Yes Historical Provider, MD   Cholecalciferol (VITAMIN D3) 50 MCG (2000 UT) CAPS Take by mouth daily    Yes Historical Provider, MD   atorvastatin (LIPITOR) 40 MG tablet Take 60 mg by mouth daily  5/22/20  Yes Historical Provider, MD   insulin glargine (LANTUS SOLOSTAR) 100 UNIT/ML injection pen Inject 25 Units into the skin nightly  Patient taking differently: Inject 60 Units into the skin nightly  3/22/19  Yes Presley Brown MD   insulin lispro (HUMALOG KWIKPEN) 100 UNIT/ML pen Inject 4 Units into the skin 3 times daily (before meals)  Patient taking differently: Inject 8 Units into the skin 3 times daily (before meals)  3/22/19  Yes Irma Grubbs MD   fluticasone (FLONASE) 50 MCG/ACT nasal spray 1 spray by Each Nare route daily 3/23/19  Yes Presley Brown MD   Lancets MISC 1 each by Does not apply route daily 3/20/19  Yes Presley Brown MD   Insulin Pen Needle 31G X 6 MM MISC 1 each by Does not apply route daily 3/20/19  Yes Presley Brown MD   losartan (COZAAR) 25 MG tablet Take 50 mg by mouth daily    Yes Historical Provider, MD       No Known Allergies    Social History     Socioeconomic History    Marital status:      Spouse name: Not on file    Number of children: Not on file    Years of education: Not on file    Highest education level: Not on file   Occupational History    Not on file   Tobacco Use    Smoking status: Former Smoker     Packs/day: 1.00     Years: 12.00     Pack years: 12.00     Types: Cigarettes     Quit date: 6/15/2005     Years since quittin.2    Smokeless tobacco: Never Used   Vaping Use    Vaping Use: Never used   Substance and Sexual Activity    Alcohol use: Yes     Comment: rarely    Drug use: Never    Sexual activity: Not on file   Other Topics Concern    Not on file   Social History Narrative    Not on file     Social Determinants of Health     Financial Resource Strain:     Difficulty of Paying Living Expenses:    Food Insecurity:     Worried About Running Out of Food in the Last Year:     [address] in the Last Year:    Transportation Needs:     Lack of Transportation (Medical):  Lack of Transportation (Non-Medical):    Physical Activity:     Days of Exercise per Week:     Minutes of Exercise per Session:    Stress:     Feeling of Stress :    Social Connections:     Frequency of Communication with Friends and Family:     Frequency of Social Gatherings with Friends and Family:     Attends Church Services:     Active Member of Clubs or Organizations:     Attends Club or Organization Meetings:     Marital Status:    Intimate Partner Violence:     Fear of Current or Ex-Partner:     Emotionally Abused:     Physically Abused:     Sexually Abused:        Family History   Problem Relation Age of Onset    Heart Attack Mother     Heart Attack Father 48    Heart Attack Maternal Grandmother     Heart Attack Other     Mult Sclerosis Sister        REVIEW OF SYSTEMS:     Franklin Topete denies fever/chills, chest pain, shortness of breath, new bowel or bladder complaints. All other review of systems was negative. PHYSICAL EXAMINATION:      /64   Pulse 94   Resp 16   Ht 5' 5\" (1.651 m)   Wt 285 lb (129.3 kg)   LMP  (LMP Unknown)   SpO2 98%   BMI 47.43 kg/m²   Back:   SCS lead insertion site- CDI- SCA leads removed using sterile precautions. Lead tip intact. Assessment/Plan:   Diagnosis Orders   1. Neuralgia and neuritis  pregabalin (LYRICA) 25 MG capsule   2.  Complex regional pain syndrome type 2 of right lower extremity     3. Chronic pain of right ankle     4. Chronic pain in right foot       For SCS trial leads removal today- SCS lead removed easily. No significant pain relief even on multiple programming and she did not want paresthesia trial.     diagnosed with cancer recently and is worked up by oncology. In a lot of stress. Start low dose Lyrica. 25 mg tid. Consider increasing the dose. Consider SNRB/ PNS. ITP- in future.     Mona Britt MD

## 2021-09-09 NOTE — PROGRESS NOTES
Do you currently have any of the following:    Fever: No  Headache:  No  Cough: No  Shortness of breath: No  Exposed to anyone with these symptoms: No         Donna Thapa presents to the [facility] on 9/9/2021. Trisha Jade is complaining of pain right foot. The pain is constant. The pain is described as tingling. Pain is rated on her best day at a 6, on her worst day at a 10, and on average at a 7 on the VAS scale. She took her last dose of Percocet, Tramadol and Motrin . Any procedures since your last visit: No, with  % relief. Pacemaker or defibrillator: No managed by . She is  on NSAIDS and is not on anticoagulation medications to include none and is managed by . Medication Contract and Consent for Opioid Use Documents Filed      No documents found                /64   Pulse 94   Resp 16   Ht 5' 5\" (1.651 m)   Wt 285 lb (129.3 kg)   LMP  (LMP Unknown)   SpO2 98%   BMI 47.43 kg/m²      No LMP recorded (lmp unknown). Patient has had an injection.

## 2021-11-11 ENCOUNTER — PREP FOR PROCEDURE (OUTPATIENT)
Dept: PAIN MANAGEMENT | Age: 51
End: 2021-11-11

## 2021-11-11 ENCOUNTER — OFFICE VISIT (OUTPATIENT)
Dept: PAIN MANAGEMENT | Age: 51
End: 2021-11-11
Payer: MEDICARE

## 2021-11-11 VITALS
SYSTOLIC BLOOD PRESSURE: 140 MMHG | BODY MASS INDEX: 47.48 KG/M2 | DIASTOLIC BLOOD PRESSURE: 90 MMHG | HEART RATE: 80 BPM | HEIGHT: 65 IN | RESPIRATION RATE: 16 BRPM | WEIGHT: 285 LBS | OXYGEN SATURATION: 98 % | TEMPERATURE: 98.5 F

## 2021-11-11 DIAGNOSIS — G89.29 CHRONIC PAIN IN RIGHT FOOT: ICD-10-CM

## 2021-11-11 DIAGNOSIS — G89.29 CHRONIC PAIN OF RIGHT ANKLE: Primary | ICD-10-CM

## 2021-11-11 DIAGNOSIS — M79.2 NEURALGIA AND NEURITIS: ICD-10-CM

## 2021-11-11 DIAGNOSIS — M79.671 CHRONIC PAIN IN RIGHT FOOT: ICD-10-CM

## 2021-11-11 DIAGNOSIS — M79.2 NEURALGIA AND NEURITIS: Primary | ICD-10-CM

## 2021-11-11 DIAGNOSIS — G57.71 COMPLEX REGIONAL PAIN SYNDROME TYPE 2 OF RIGHT LOWER EXTREMITY: ICD-10-CM

## 2021-11-11 DIAGNOSIS — M25.571 CHRONIC PAIN OF RIGHT ANKLE: Primary | ICD-10-CM

## 2021-11-11 PROCEDURE — 3017F COLORECTAL CA SCREEN DOC REV: CPT | Performed by: ANESTHESIOLOGY

## 2021-11-11 PROCEDURE — 99214 OFFICE O/P EST MOD 30 MIN: CPT | Performed by: ANESTHESIOLOGY

## 2021-11-11 PROCEDURE — G8417 CALC BMI ABV UP PARAM F/U: HCPCS | Performed by: ANESTHESIOLOGY

## 2021-11-11 PROCEDURE — 1036F TOBACCO NON-USER: CPT | Performed by: ANESTHESIOLOGY

## 2021-11-11 PROCEDURE — G8427 DOCREV CUR MEDS BY ELIG CLIN: HCPCS | Performed by: ANESTHESIOLOGY

## 2021-11-11 PROCEDURE — G8484 FLU IMMUNIZE NO ADMIN: HCPCS | Performed by: ANESTHESIOLOGY

## 2021-11-11 PROCEDURE — 99213 OFFICE O/P EST LOW 20 MIN: CPT | Performed by: ANESTHESIOLOGY

## 2021-11-11 RX ORDER — PREGABALIN 75 MG/1
75 CAPSULE ORAL 2 TIMES DAILY
Qty: 60 CAPSULE | Refills: 1 | Status: SHIPPED
Start: 2021-11-11 | End: 2022-04-13

## 2021-11-11 NOTE — PROGRESS NOTES
surgical repair. Tendinous and ligament structures are   otherwise grossly intact. No enhancing lesions.     MRI ANKLE WO [facility] RT3/23/2018  [facility]  Result Impression   IMPRESSION:    1.  SEVERE LATERAL ANKLE SPRAIN WITH INJURIES OF THE ANTERIOR TALOFIBULAR   AND CALCANEOFIBULAR LIGAMENTS.  THERE IS A BONE CONTUSION OF THE MEDIAL   ASPECT OF THE TALAR DOME. 2.  BONE CONTUSION OF THE CUBOID BONE. 3.  RIGHT ANKLE EFFUSION.       Xray of the right ankle: 2/2018:      Impression   A tiny bony fragment from the medial malleolus which could represent a   small avulsion injury. No other displaced fractures are identified.       Soft tissue swelling                                            Potential Aberrant Drug-Related Behavior:no       Urine Drug Screening:no     OARRS report[de-identified]  Yes- reviewed: today                                            Past Medical History:   Diagnosis Date    Cancer ([facility] [address] 2012    basal cell scalp    Diabetes mellitus ([facility] [address]     Hyperlipidemia     Hypertension     Nerve pain     right foot    Obese     PONV (postoperative nausea and vomiting)     Seasonal allergies        Past Surgical History:   Procedure Laterality Date    ANESTHESIA NERVE BLOCK Right 6/18/2020    RIGHT LUMBAR SYMPATHETIC BLOCK UNDER FLUORO performed by Nirali Gonzales MD at [address] [place] [address]  years ago    removal cysts    NEUROMA SURGERY Right     done x 3 / last one 2/2019    PAIN MANAGEMENT PROCEDURE N/A 9/2/2021    SPINAL CORD STIMULATOR TRIAL WITH Adele Son  (CPT 55262) performed by Nirali Gonzales MD at [address] [place] [address] Left     rotator cuff done x 3 / last one 2012    SKIN CANCER EXCISION  2012    basal cell ca scalp excision       Prior to Admission medications    Medication Sig Start Date End Date Taking?  Authorizing Provider   Semaglutide,0.25 or 0.5MG/DOS, (OZEMPIC, 0.25 OR 0.5 MG/DOSE,) 2 MG/1.5ML SOPN Inject 0.5 mg into the skin once a week Takes every Thursday Yes Historical Provider, MD   DULoxetine (CYMBALTA) 60 MG extended release capsule Take 60 mg by mouth daily   Yes Historical Provider, MD   Cholecalciferol (VITAMIN D3) 50 MCG ( UT) CAPS Take by mouth daily    Yes Historical Provider, MD   atorvastatin (LIPITOR) 40 MG tablet Take 60 mg by mouth daily  20  Yes Historical Provider, MD   insulin glargine (LANTUS SOLOSTAR) 100 UNIT/ML injection pen Inject 25 Units into the skin nightly  Patient taking differently: Inject 60 Units into the skin nightly  3/22/19  Yes Eileen Kearney MD   insulin lispro (HUMALOG KWIKPEN) 100 UNIT/ML pen Inject 4 Units into the skin 3 times daily (before meals)  Patient taking differently: Inject 8 Units into the skin 3 times daily (before meals)  3/22/19  Yes Irma Grubbs MD   fluticasone (FLONASE) 50 MCG/ACT nasal spray 1 spray by Each Nare route daily 3/23/19  Yes Eileen Kearney MD   Lancets MISC 1 each by Does not apply route daily 3/20/19  Yes Irma Grubbs MD   Insulin Pen Needle 31G X 6 MM MISC 1 each by Does not apply route daily 3/20/19  Yes Eileen Kearney MD   losartan (COZAAR) 25 MG tablet Take 50 mg by mouth daily    Yes Historical Provider, MD   pregabalin (LYRICA) 25 MG capsule Take 1 capsule by mouth 3 times daily for 60 days.  21  Amrita Frazier MD       No Known Allergies    Social History     Socioeconomic History    Marital status:      Spouse name: Not on file    Number of children: Not on file    Years of education: Not on file    Highest education level: Not on file   Occupational History    Not on file   Tobacco Use    Smoking status: Former Smoker     Packs/day: 1.00     Years: 12.00     Pack years: 12.00     Types: Cigarettes     Quit date: 6/15/2005     Years since quittin.4    Smokeless tobacco: Never Used   Vaping Use    Vaping Use: Never used   Substance and Sexual Activity    Alcohol use: Yes     Comment: rarely    Drug use: Never    Sexual activity: Not on file   Other Topics Concern    Not on file   Social History Narrative    Not on file     Social Determinants of Health     Financial Resource Strain:     Difficulty of Paying Living Expenses: Not on file   Food Insecurity:     Worried About Running Out of Food in the Last Year: Not on file    Willy of Food in the Last Year: Not on file   Transportation Needs:     Lack of Transportation (Medical): Not on file    Lack of Transportation (Non-Medical): Not on file   Physical Activity:     Days of Exercise per Week: Not on file    Minutes of Exercise per Session: Not on file   Stress:     Feeling of Stress : Not on file   Social Connections:     Frequency of Communication with Friends and Family: Not on file    Frequency of Social Gatherings with Friends and Family: Not on file    Attends Jainism Services: Not on file    Active Member of [address] [place] [address] [employer] or Organizations: Not on file    Attends Club or Organization Meetings: Not on file    Marital Status: Not on file   Intimate Partner Violence:     Fear of Current or Ex-Partner: Not on file    Emotionally Abused: Not on file    Physically Abused: Not on file    Sexually Abused: Not on file   Housing Stability:     Unable to Pay for Housing in the Last Year: Not on file    Number of [place] in the Last Year: Not on file    Unstable Housing in the Last Year: Not on file       Family History   Problem Relation Age of Onset    Heart Attack Mother     Heart Attack Father 48    Heart Attack Maternal Grandmother     Heart Attack Other     Mult Sclerosis Sister        REVIEW OF SYSTEMS:     Nathan Paul denies fever/chills, chest pain, shortness of breath, new bowel or bladder complaints. All other review of systems was negative.     PHYSICAL EXAMINATION:      BP (!) 140/90   Pulse 80   Temp 98.5 °F (36.9 °C) (Infrared)   Resp 16   Ht 5' 5\" (1.651 m)   Wt 285 lb (129.3 kg)   SpO2 98%   BMI 47.43 kg/m²   General:       General appearance:  Pleasant and well-hydrated, in no distress and A & O x 3  Build:Obese  Function: Rises from seated position easily and Moves about room with difficulty     HEENT:     Head:normocephalic, atraumatic   Lungs:     Breathing:normal breathing pattern     Abdomen:     Shape:non-distended and normal     Cervical spine:     Inspection:normal     Thoracic spine:                Range of motion:normal in flexion, extension rotation bilateral and is not painful.     Lumbar spine:     Range of motion: Decreased, flexion Decreased, Lateral bending, extension and rotation bilaterally reduced is not painful.     Musculoskeletal:     Shoulder ROM -appears intact   Extremities:     Tremors:None bilaterally upper and lower     Right ankle/ foot:  Scar form the prior surgery - healed well  Ankle ROM reduced  Some swelling noted   Dysesthesia/ allodynia + right distal LE. Assessment/Plan:   Diagnosis Orders   1. Chronic pain of right ankle     2. Chronic pain in right foot     3. Neuralgia and neuritis     4. Complex regional pain syndrome type 2 of right lower extremity     H/o fall in 2018 following which she had right ankle injury / tendon injury. S/P surgery for the same. Had developed neuroma and subsequently had surgeries for neuroma treatment- complicated by infection for which she had undergone I & D.     Having pain mainly over the right lateral ankle, foot, lateral three toes and numbness. Intermittent swelling and color changes +.     Failed multiple modalities of treatment including meds/ PT/ local injection, Lumbar sympathetic block etc.     Features of neuropathic pain/ CRPS -type 2.   S/P SCS trial- nervro system: No significant improvement. - Increase  Lyrica to 75 mg BID.     - Compound cream for local use. Script given. - Schedule for Right L5 and S1 SNRB under fluoroscopy. RBA discussed    Consider  PNS/ ITP- in future. Note:  diagnosed with cancer recently and is worked up by oncology. In a lot of stress.      Ana Cristina Riley MD [place]    CC:  KENNY ELIZABETH III, DO

## 2021-11-11 NOTE — PROGRESS NOTES
Do you currently have any of the following:    Fever: No  Headache:  No  Cough: No  Shortness of breath: No  Exposed to anyone with these symptoms: No         Avery Dunlap [facility] to the [facility] on 11/11/[facility]21. Jose Pederson is complaining of pain both feet The pain is constant. The pain is described as burning. Pain is rated on her best day at a 6, on her worst day at a 10, and on average at a 7 on the VAS scale. She took her last dose of . Any procedures since your last visit:     Pacemaker or defibrillator: No managed by     She is not on NSAIDS and is not on anticoagulation medications to include none and is managed by     Medication Contract and Consent for Opioid Use Documents Filed      No documents found                BP (!) 140/90   Pulse 80   Temp 98.5 °F (36.9 °C) (Infrared)   Resp 16   Ht 5' 5\" (1.651 m)   Wt 285 lb (129.3 kg)   SpO2 98%   BMI 47.43 kg/m²      No LMP recorded. Patient has had an injection.

## 2021-11-12 ENCOUNTER — TELEPHONE (OUTPATIENT)
Dept: PAIN MANAGEMENT | Age: 51
End: 2021-11-12

## 2021-11-12 NOTE — TELEPHONE ENCOUNTER
Call to Ari Faust that procedure was approved for 12.02.2021 and that the surgery center should call her a few days before for the pre op call and after 3:00 PM the business day before with the arrival time. Instructed Frederick Cruz to hold ibuprofen for 24 hours, naprosyn for 4 days and any aspirin containing products or fish oil for 7 days. Instructed to call office back if any questions or concerns. Frederick Cruz verbalized understanding.

## 2021-12-01 RX ORDER — ERGOCALCIFEROL 1.25 MG/1
50000 CAPSULE ORAL WEEKLY
COMMUNITY

## 2021-12-01 NOTE — PROGRESS NOTES
Have you been tested for COVID  Yes           Have you been told you were positive for COVID No  Have you had any known exposure to someone that is positive for COVID No  Do you have a cough                   No              Do you have shortness of breath No                 Do you have a sore throat            No                Are you having chills                    No                Are you having muscle aches. No                    Please come to the hospital wearing a mask and have your significant other wear a mask as well. Both of you should check your temperature before leaving to come here,  if it is 100 or higher please call [phone] for instruction.

## 2021-12-01 NOTE — PROGRESS NOTES
[facility] PRE-ADMISSION TESTING INSTRUCTIONS    The Preadmission Testing patient is instructed accordingly using the following criteria (check applicable):    ARRIVAL INSTRUCTIONS:  [x] Parking the day of Surgery is located in the Main Entrance lot. Upon entering the door, make an immediate right to the surgery reception desk    [x] Bring photo ID and insurance card    [] Bring in a copy of Living will or Durable Power of  papers. [x] Please be sure to arrange for responsible adult to provide transportation to and from the hospital    [x] Please arrange for responsible adult to be with you for the 24 hour period post procedure due to having anesthesia      GENERAL INSTRUCTIONS:    [x] Nothing by mouth after midnight, including gum, candy, mints or water    [x] You may brush your teeth, but do not swallow any water    [x] Take medications as instructed with 1-2 oz of water    [] Stop herbal supplements and vitamins 5 days prior to procedure    [] Follow preop dosing of blood thinners per physician instructions    [x] Take 1/2 dose of evening insulin, but no insulin after midnight    [x] No oral diabetic medications after midnight    [x] If diabetic and have low blood sugar or feel symptomatic, take 1-2oz apple juice only    [] Bring inhalers day of surgery    [] Bring C-PAP/ Bi-Pap day of surgery    [x] Bring urine specimen day of surgery    [x] Shower or bath with soap, lather and rinse well, AM of Surgery, no lotion, powders or creams to surgical site    [] Follow bowel prep as instructed per surgeon    [x] No tobacco products within 24 hours of surgery     [x] No alcohol or illegal drug use within 24 hours of surgery.     [x] Jewelry, body piercing's, eyeglasses, contact lenses and dentures are not permitted into surgery (bring cases)      [x] Please do not wear any nail polish, make up or hair products on the day of surgery    [x] You can expect a call the business day prior to procedure to notify you if your arrival time changes    [x] If you receive a survey after surgery we would greatly appreciate your comments    [] Parent/guardian of a minor must accompany their child and remain on the premises  the entire time they are under our care     [] Pediatric patients may bring favorite toy, blanket or comfort item with them    [] A caregiver or family member must remain with the patient during their stay if they are mentally handicapped, have dementia, disoriented or unable to use a call light or would be a safety concern if left unattended    [x] Please notify surgeon if you develop any illness between now and time of surgery (cold, cough, sore throat, fever, nausea, vomiting) or any signs of infections  including skin, wounds, and dental.    [x]  The Outpatient Pharmacy is available to fill your prescription here on your day of surgery, ask your preop nurse for details    [] Other instructions    EDUCATIONAL MATERIALS PROVIDED:    [] PAT Preoperative Education Packet/Booklet     [] Medication List    [] Transfusion bracelet applied with instructions    [] Shower with soap, lather and rinse well, and use CHG wipes provided the evening before surgery as instructed    [] Incentive spirometer with instructions

## 2021-12-02 ENCOUNTER — ANESTHESIA EVENT (OUTPATIENT)
Dept: OPERATING ROOM | Age: 51
End: 2021-12-02
Payer: MEDICARE

## 2021-12-02 ENCOUNTER — HOSPITAL ENCOUNTER (OUTPATIENT)
Dept: GENERAL RADIOLOGY | Age: 51
Setting detail: OUTPATIENT SURGERY
Discharge: HOME | End: 2021-12-04
Attending: ANESTHESIOLOGY
Payer: MEDICARE

## 2021-12-02 ENCOUNTER — HOSPITAL ENCOUNTER (OUTPATIENT)
Age: 51
Setting detail: OUTPATIENT SURGERY
Discharge: HOME | End: 2021-12-02
Attending: ANESTHESIOLOGY | Admitting: ANESTHESIOLOGY
Payer: MEDICARE

## 2021-12-02 ENCOUNTER — ANESTHESIA (OUTPATIENT)
Dept: OPERATING ROOM | Age: 51
End: 2021-12-02
Payer: MEDICARE

## 2021-12-02 VITALS
HEIGHT: 65 IN | DIASTOLIC BLOOD PRESSURE: 74 MMHG | TEMPERATURE: 98.6 F | WEIGHT: 285 LBS | OXYGEN SATURATION: 96 % | HEART RATE: 101 BPM | RESPIRATION RATE: 18 BRPM | SYSTOLIC BLOOD PRESSURE: 169 MMHG | BODY MASS INDEX: 47.48 KG/M2

## 2021-12-02 VITALS
RESPIRATION RATE: 12 BRPM | OXYGEN SATURATION: 99 % | SYSTOLIC BLOOD PRESSURE: 163 MMHG | DIASTOLIC BLOOD PRESSURE: 96 MMHG

## 2021-12-02 DIAGNOSIS — R52 PAIN MANAGEMENT: ICD-10-CM

## 2021-12-02 LAB — METER GLUCOSE: 200 MG/DL (ref 74–99)

## 2021-12-02 PROCEDURE — 6360000004 HC RX CONTRAST MEDICATION: Performed by: ANESTHESIOLOGY

## 2021-12-02 PROCEDURE — 64484 NJX AA&/STRD TFRM EPI L/S EA: CPT | Performed by: ANESTHESIOLOGY

## 2021-12-02 PROCEDURE — 3209999900 FLUORO FOR SURGICAL PROCEDURES

## 2021-12-02 PROCEDURE — 6360000002 HC RX W HCPCS: Performed by: NURSE ANESTHETIST, CERTIFIED REGISTERED

## 2021-12-02 PROCEDURE — 3600000002 HC SURGERY LEVEL 2 BASE: Performed by: ANESTHESIOLOGY

## 2021-12-02 PROCEDURE — 7100000010 HC PHASE II RECOVERY - FIRST 15 MIN: Performed by: ANESTHESIOLOGY

## 2021-12-02 PROCEDURE — 3700000000 HC ANESTHESIA ATTENDED CARE: Performed by: ANESTHESIOLOGY

## 2021-12-02 PROCEDURE — 2709999900 HC NON-CHARGEABLE SUPPLY: Performed by: ANESTHESIOLOGY

## 2021-12-02 PROCEDURE — 64483 NJX AA&/STRD TFRM EPI L/S 1: CPT | Performed by: ANESTHESIOLOGY

## 2021-12-02 PROCEDURE — 2500000003 HC RX 250 WO HCPCS: Performed by: ANESTHESIOLOGY

## 2021-12-02 PROCEDURE — 6360000002 HC RX W HCPCS: Performed by: ANESTHESIOLOGY

## 2021-12-02 PROCEDURE — 7100000011 HC PHASE II RECOVERY - ADDTL 15 MIN: Performed by: ANESTHESIOLOGY

## 2021-12-02 PROCEDURE — 82962 GLUCOSE BLOOD TEST: CPT

## 2021-12-02 PROCEDURE — 2580000003 HC RX 258: Performed by: NURSE ANESTHETIST, CERTIFIED REGISTERED

## 2021-12-02 RX ORDER — SODIUM CHLORIDE 9 MG/ML
INJECTION, SOLUTION INTRAVENOUS CONTINUOUS PRN
Status: DISCONTINUED | OUTPATIENT
Start: 2021-12-02 | End: 2021-12-02 | Stop reason: SDUPTHER

## 2021-12-02 RX ORDER — METHYLPREDNISOLONE ACETATE 40 MG/ML
INJECTION, SUSPENSION INTRA-ARTICULAR; INTRALESIONAL; INTRAMUSCULAR; SOFT TISSUE PRN
Status: DISCONTINUED | OUTPATIENT
Start: 2021-12-02 | End: 2021-12-02 | Stop reason: ALTCHOICE

## 2021-12-02 RX ORDER — FENTANYL CITRATE 50 UG/ML
INJECTION, SOLUTION INTRAMUSCULAR; INTRAVENOUS PRN
Status: DISCONTINUED | OUTPATIENT
Start: 2021-12-02 | End: 2021-12-02 | Stop reason: SDUPTHER

## 2021-12-02 RX ORDER — MIDAZOLAM HYDROCHLORIDE 1 MG/ML
INJECTION INTRAMUSCULAR; INTRAVENOUS PRN
Status: DISCONTINUED | OUTPATIENT
Start: 2021-12-02 | End: 2021-12-02 | Stop reason: SDUPTHER

## 2021-12-02 RX ORDER — LIDOCAINE HYDROCHLORIDE 5 MG/ML
INJECTION, SOLUTION INFILTRATION; INTRAVENOUS PRN
Status: DISCONTINUED | OUTPATIENT
Start: 2021-12-02 | End: 2021-12-02 | Stop reason: ALTCHOICE

## 2021-12-02 RX ADMIN — FENTANYL CITRATE 100 MCG: 50 INJECTION, SOLUTION INTRAMUSCULAR; INTRAVENOUS at 13:32

## 2021-12-02 RX ADMIN — MIDAZOLAM 2 MG: 1 INJECTION INTRAMUSCULAR; INTRAVENOUS at 13:30

## 2021-12-02 RX ADMIN — FENTANYL CITRATE 50 MCG: 50 INJECTION, SOLUTION INTRAMUSCULAR; INTRAVENOUS at 13:37

## 2021-12-02 RX ADMIN — SODIUM CHLORIDE: 9 INJECTION, SOLUTION INTRAVENOUS at 12:56

## 2021-12-02 RX ADMIN — FENTANYL CITRATE 50 MCG: 50 INJECTION, SOLUTION INTRAMUSCULAR; INTRAVENOUS at 13:38

## 2021-12-02 ASSESSMENT — PAIN DESCRIPTION - DESCRIPTORS
DESCRIPTORS: DISCOMFORT
DESCRIPTORS: DISCOMFORT

## 2021-12-02 ASSESSMENT — PAIN DESCRIPTION - PROGRESSION
CLINICAL_PROGRESSION: NOT CHANGED
CLINICAL_PROGRESSION: NOT CHANGED

## 2021-12-02 ASSESSMENT — PAIN DESCRIPTION - ONSET: ONSET: ON-GOING

## 2021-12-02 ASSESSMENT — PAIN DESCRIPTION - PAIN TYPE: TYPE: CHRONIC PAIN

## 2021-12-02 ASSESSMENT — LIFESTYLE VARIABLES: SMOKING_STATUS: 0

## 2021-12-02 ASSESSMENT — PAIN DESCRIPTION - ORIENTATION: ORIENTATION: RIGHT

## 2021-12-02 ASSESSMENT — PAIN DESCRIPTION - LOCATION: LOCATION: FOOT

## 2021-12-02 ASSESSMENT — PAIN DESCRIPTION - FREQUENCY: FREQUENCY: CONTINUOUS

## 2021-12-02 ASSESSMENT — PAIN - FUNCTIONAL ASSESSMENT: PAIN_FUNCTIONAL_ASSESSMENT: 0-10

## 2021-12-02 ASSESSMENT — PAIN SCALES - GENERAL: PAINLEVEL_OUTOF10: 6

## 2021-12-02 NOTE — OP NOTE
Operative Note      Patient: Gisel Munoz  YOB: 1970  MRN: [id number]    Date of Procedure: 2021    Pre-Op Diagnosis: NEURALGIA AND NEURITIS, CHRONIC PAIN IN RIGHT FOOT    Post-Op Diagnosis: Same       Procedure(s):  LUMBAR TRANSFORAMINAL EPIDURAL STEROID INJECTION RIGHT L5 AND S1 UNDER FLUOROSCOPIC GUIDANCE    Surgeon(s):  Bin Ashley MD    Assistant:   * No surgical staff found *    Anesthesia: Monitor Anesthesia Care    Estimated Blood Loss (mL): Minimal    Complications: None    Specimens:   * No specimens in log *    Implants:  * No implants in log *      Drains: * No LDAs found *    Findings: good needle placement    Detailed Description of Procedure:   2021    Patient: Gisel Munoz  :  1970  Age:  45 y.o. Sex:  female     PRE-OPERATIVE DIAGNOSIS:   NEURALGIA AND NEURITIS, CHRONIC PAIN IN RIGHT FOOT     POST-OPERATIVE DIAGNOSIS: Same. PROCEDURE: Right Transforaminal epidural steroid injection under fluoroscopic guidance at foraminal level L5 and S1.    SURGEON: Bin Ashley MD    ANESTHESIA: MAC    ESTIMATED BLOOD LOSS: None.  ______________________________________________________________________  BRIEF HISTORY: Gisel Munoz comes in today for the lumbar transforaminal epidural steroid injection under fluoroscopic guidance at foraminal level L5 and S1 . The potential complications of this procedure were discussed with her again today. She has elected to undergo the aforementioned procedure. Karmen complete History & Physical examination were reviewed in depth, a copy of which is in the chart. DESCRIPTION OF PROCEDURE:    After confirming written and informed consent, a time-out was performed and Karmen name and date of birth, the procedure to be performed as well as the plan for the location of the needle insertion were confirmed. The patient was brought into the procedure room and placed in the prone position on the fluoroscopy table.  Standard monitors were placed and vital signs were observed throughout the procedure. The area of the lumbar spine was prepped with chloraprep and draped in a sterile manner. The vertebral body was identified with AP fluoroscopy. An oblique view was obtained to better visualize the inferior junction of the pedicle and transverse process . The 6 o'clock position of the pedicle was marked and identified. The skin and subcutaneous tissue were anesthetized with 0.5% lidocaine. TWO # 22 gauge 5 inch pencil point needle was directed toward the targeted point under fluoroscopy until bone was contacted. The needle was then walked inferiorly until the neural foramen was entered . A lateral fluoroscopic view was then used to place the needle tip in the middle of the foramen. Negative aspiration was confirmed for blood and CSF and 0.5-1 cc of Omnipaque 240 contrast was injected at each level under live fluoroscopy. Appropriate neurograms were observed under AP fluoroscopy. Then after negative aspiration, a solution of the 3 cc of 0.5% lidocaine and 30 mg DepoMedrol was easily injected at each level. The needles were removed with constant aspiration technique. The patient back was cleaned and a bandage was placed over the needle insertion points    Disposition the patient tolerated the procedure well and there were no complications . Vital signs remained stable throughout the procedure. The patient was escorted to the recovery area where they remained until discharge and written discharge instructions for the procedure were given. Plan: Angely Rock will return to our pain management center as scheduled.      Chris Watkins MD

## 2021-12-02 NOTE — ANESTHESIA PRE PROCEDURE
Department of Anesthesiology  Preprocedure Note       Name:  Anabela Baez   Age:  45 y.o.  :  1970                                          MRN:  [id number]         Date:  2021      Surgeon: Darion Irvin):  Izabella Romeo MD    Procedure: Procedure(s):  LUMBAR TRANSFORAMINAL EPIDURAL STEROID INJECTION RIGHT L5 AND S1 UNDER FLUOROSCOPIC GUIDANCE ([facility]:56502)    Medications prior to admission:   Prior to Admission medications    Medication Sig Start Date End Date Taking? Authorizing Provider   vitamin D (ERGOCALCIFEROL) 1.25 MG (47617 UT) CAPS capsule Take 50,000 Units by mouth once a week   Yes Historical Provider, MD   pregabalin (LYRICA) 75 MG capsule Take 1 capsule by mouth 2 times daily for 60 days.  11/11/21 1/10/22 Yes Izabella Romeo MD   Semaglutide,0.25 or 0.5MG/DOS, (OZEMPIC, 0.25 OR 0.5 MG/DOSE,) 2 MG/1.5ML SOPN Inject 0.5 mg into the skin once a week Takes every Thursday   Yes Historical Provider, MD   DULoxetine (CYMBALTA) 60 MG extended release capsule Take 60 mg by mouth nightly    Yes Historical Provider, MD   atorvastatin (LIPITOR) 40 MG tablet Take 60 mg by mouth nightly  20  Yes Historical Provider, MD   insulin glargine (LANTUS SOLOSTAR) 100 UNIT/ML injection pen Inject 25 Units into the skin nightly  Patient taking differently: Inject 70 Units into the skin nightly  3/22/19  Yes Davi Mckenna MD   insulin lispro (HUMALOG KWIKPEN) 100 UNIT/ML pen Inject 4 Units into the skin 3 times daily (before meals)  Patient taking differently: Inject 8 Units into the skin 3 times daily (before meals)  3/22/19  Yes Irma Grubbs MD   fluticasone (FLONASE) 50 MCG/ACT nasal spray 1 spray by Each Nare route daily 3/23/19  Yes Irma Grubbs MD   losartan (COZAAR) 25 MG tablet Take 50 mg by mouth nightly    Yes Historical Provider, MD   Lancets MISC 1 each by Does not apply route daily 3/20/19   Irma Grubbs MD   Insulin Pen Needle 31G X 6 MM MISC 1 each by Does not apply route daily 3/20/19 1135   BP:  (!) 159/84   Pulse:  94   Resp:  18   Temp:  98.6 °F (37 °C)   TempSrc:  Temporal   SpO2:  97%   Weight: 285 lb (129.3 kg) 285 lb (129.3 kg)   Height: 5' 5\" (1.651 m) 5' 5\" (1.651 m)                                              BP Readings from Last 3 Encounters:   12/02/21 (!) 159/84   11/11/21 (!) 140/90   09/09/21 122/64       NPO Status: Time of last liquid consumption: 2330                        Time of last solid consumption: 2330                        Date of last liquid consumption: 12/01/21                        Date of last solid food consumption: 12/01/21    BMI:   Wt Readings from Last 3 Encounters:   12/02/21 285 lb (129.3 kg)   11/11/21 285 lb (129.3 kg)   09/09/21 285 lb (129.3 kg)     Body mass index is 47.43 kg/m². CBC:   Lab Results   Component Value Date    WBC 9.9 02/09/2021    RBC 4.65 02/09/2021    HGB 12.8 02/09/2021    HCT 38.3 02/09/2021    MCV 82.4 02/09/2021    RDW 13.7 02/09/2021     02/09/2021       CMP:   Lab Results   Component Value Date     02/09/2021    K 4.2 02/09/2021     02/09/2021    CO2 19 02/09/2021    BUN 20 02/09/2021    CREATININE 0.8 02/09/2021    GFRAA >60 02/09/2021    LABGLOM >60 02/09/2021    GLUCOSE 279 02/09/2021    PROT 7.3 02/09/2021    CALCIUM 9.0 02/09/2021    BILITOT <0.2 02/09/2021    ALKPHOS 103 02/09/2021    AST 23 02/09/2021    ALT 31 02/09/2021       POC Tests: No results for input(s): POCGLU, POCNA, POCK, POCCL, POCBUN, POCHEMO, POCHCT in the last 72 hours.     Coags: No results found for: PROTIME, INR, APTT    HCG (If Applicable):   Lab Results   Component Value Date    PREGTESTUR NEGATIVE 06/18/2020        ABGs: No results found for: PHART, PO2ART, HGZ9WLC, PXI3KLW, BEART, F7DOGBFC     Type & Screen (If Applicable):  No results found for: LABABO, LABRH    Drug/Infectious Status (If Applicable):  No results found for: HIV, HEPCAB    COVID-19 Screening (If Applicable):   Lab Results   Component Value Date    COVID19 Not Detected 02/09/2021         Anesthesia Evaluation  Patient summary reviewed   history of anesthetic complications: PONV. Airway: Mallampati: II  TM distance: >3 FB   Neck ROM: full   Dental:          Pulmonary: breath sounds clear to auscultation      (-) not a current smoker                          ROS comment: Seasonal allergies  Former Smoker    Cardiovascular:    (+) hypertension:, hyperlipidemia        Rhythm: regular  Rate: normal           Beta Blocker:  Not on Beta Blocker         Neuro/Psych:   (+) neuromuscular disease (COMPLEX REGIONAL PAIN SYNDROME TYPE 2 RIGHT LOWER EXTREMITY):, psychiatric history: stable with treatmentdepression/anxiety             GI/Hepatic/Renal:   (+) morbid obesity          Endo/Other:    (+) DiabetesType II DM, using insulin, . Abdominal:   (+) obese,           Vascular: negative vascular ROS. Other Findings:               Anesthesia Plan      MAC     ASA 3       Induction: intravenous. Plan discussed with CRNA. DOS STAFF ADDENDUM:    Pt seen and examined, chart reviewed (including anesthesia, drug and allergy history). Anesthetic plan, risks, benefits, alternatives, and personnel involved discussed with patient. Patient verbalized an understanding and agrees to proceed. Plan discussed with care team members and agreed upon.     Marcelle Metz MD  Staff Anesthesiologist  11:53 AM    Marcelle Metz MD   12/2/2021

## 2021-12-02 NOTE — ANESTHESIA POSTPROCEDURE EVALUATION
Department of Anesthesiology  Postprocedure Note    Patient: Hiro Marshall  MRN: [id number]  YOB: 1970  Date of evaluation: 12/2/2021  Time:  2:35 PM     Procedure Summary     Date: 12/02/21 Room / Location: [facility] PROCEDURE ROOM 02 / [facility]    Anesthesia Start: 8269 Anesthesia Stop: 3214    Procedure: LUMBAR TRANSFORAMINAL EPIDURAL STEROID INJECTION RIGHT L5 AND S1 UNDER FLUOROSCOPIC GUIDANCE (CRI:[id number]) (Right Back) Diagnosis: (NEURALGIA AND NEURITIS, CHRONIC PAIN IN RIGHT FOOT)    Surgeons: Amrita Frazier MD Responsible Provider: Germania Mitchell MD    Anesthesia Type: MAC ASA Status: 3          Anesthesia Type: MAC    Raffi Phase I: Raffi Score: 10    Raffi Phase II: Raffi Score: 10    Last vitals: Reviewed and per EMR flowsheets.        Anesthesia Post Evaluation    Patient location during evaluation: PACU  Patient participation: complete - patient participated  Level of consciousness: awake  Airway patency: patent  Nausea & Vomiting: no nausea and no vomiting  Complications: no  Cardiovascular status: hemodynamically stable  Respiratory status: acceptable  Hydration status: euvolemic

## 2021-12-02 NOTE — H&P
CARISSA MIRANDA [facility] Pain Management     [address], [address] [facility]  Dept: [phone]    Procedure History & Physical      Leonila Morel     HPI:    Patient  is here for lumbar TFESi for LE pain. Labs/imaging studies reviewed   All question and concerns addressed including R/B/A associated with the procedure    Past Medical History:   Diagnosis Date    Cancer ([facility] [address] 2012    basal cell scalp    Diabetes mellitus ([facility] [address]     Hyperlipidemia     Hypertension     Nerve pain     right foot    Obese     PONV (postoperative nausea and vomiting)     Seasonal allergies        Past Surgical History:   Procedure Laterality Date    ANESTHESIA NERVE BLOCK Right 6/18/2020    RIGHT LUMBAR SYMPATHETIC BLOCK UNDER FLUORO performed by Yoan Mcclain MD at [address] [facility]  years ago    removal cysts    NEUROMA SURGERY Right     done x 3 / last one 2/2019    PAIN MANAGEMENT PROCEDURE N/A 9/2/2021    SPINAL CORD STIMULATOR TRIAL WITH Jessica Ennis  (CPT 93401) performed by Yoan Mcclain MD at [address] Left     rotator cuff done x 3 / last one 2012    SKIN CANCER EXCISION  2012    basal cell ca scalp excision       Prior to Admission medications    Medication Sig Start Date End Date Taking? Authorizing Provider   vitamin D (ERGOCALCIFEROL) 1.25 MG (89758 UT) CAPS capsule Take 50,000 Units by mouth once a week   Yes Historical Provider, MD   pregabalin (LYRICA) 75 MG capsule Take 1 capsule by mouth 2 times daily for 60 days.  11/11/21 1/10/22 Yes Yoan Mcclain MD   Semaglutide,0.25 or 0.5MG/DOS, (OZEMPIC, 0.25 OR 0.5 MG/DOSE,) 2 MG/1.5ML SOPN Inject 0.5 mg into the skin once a week Takes every Thursday   Yes Historical Provider, MD   DULoxetine (CYMBALTA) 60 MG extended release capsule Take 60 mg by mouth nightly    Yes Historical Provider, MD   atorvastatin (LIPITOR) 40 MG tablet Take 60 mg by mouth nightly  5/22/20  Yes Historical Provider, MD   insulin glargine (LANTUS SOLOSTAR) 100 UNIT/ML injection pen Inject 25 Units into the skin nightly  Patient taking differently: Inject 70 Units into the skin nightly  3/22/19  Yes Irma Grubbs MD   insulin lispro (HUMALOG KWIKPEN) 100 UNIT/ML pen Inject 4 Units into the skin 3 times daily (before meals)  Patient taking differently: Inject 8 Units into the skin 3 times daily (before meals)  3/22/19  Yes Irma Grubbs MD   fluticasone (FLONASE) 50 MCG/ACT nasal spray 1 spray by Each Nare route daily 3/23/19  Yes Chinmay Burrell MD   losartan (COZAAR) 25 MG tablet Take 50 mg by mouth nightly    Yes Historical Provider, MD   Lancets MISC 1 each by Does not apply route daily 3/20/19   Irma Grubbs MD   Insulin Pen Needle 31G X 6 MM MISC 1 each by Does not apply route daily 3/20/19   Chinmay Burrell MD       No Known Allergies    Social History     Socioeconomic History    Marital status:      Spouse name: Not on file    Number of children: Not on file    Years of education: Not on file    Highest education level: Not on file   Occupational History    Not on file   Tobacco Use    Smoking status: Former Smoker     Packs/day: 1.00     Years: 12.00     Pack years: 12.00     Types: Cigarettes     Quit date: 6/15/2005     Years since quittin.4    Smokeless tobacco: Never Used   Vaping Use    Vaping Use: Never used   Substance and Sexual Activity    Alcohol use: Yes     Comment: rarely    Drug use: Never    Sexual activity: Not on file   Other Topics Concern    Not on file   Social History Narrative    Not on file     Social Determinants of Health     Financial Resource Strain:     Difficulty of Paying Living Expenses: Not on file   Food Insecurity:     Worried About [address] in the Last Year: Not on file    Willy of Food in the Last Year: Not on file   Transportation Needs:     Lack of Transportation (Medical): Not on file    Lack of Transportation (Non-Medical):  Not on file   Physical Activity:     Days of Exercise per Week: Not on file   BIO-IVT Group of Exercise per Session: Not on file   Stress:     Feeling of Stress : Not on file   Social Connections:     Frequency of Communication with Friends and Family: Not on file    Frequency of Social Gatherings with Friends and Family: Not on file    Attends Zoroastrian Services: Not on file    Active Member of [address] [place] [address] or Organizations: Not on file    Attends Club or Organization Meetings: Not on file    Marital Status: Not on file   Intimate Partner Violence:     Fear of Current or Ex-Partner: Not on file    Emotionally Abused: Not on file    Physically Abused: Not on file    Sexually Abused: Not on file   Housing Stability:     Unable to Pay for Housing in the Last Year: Not on file    Number of [place] in the Last Year: Not on file    Unstable Housing in the Last Year: Not on file       Family History   Problem Relation Age of Onset    Heart Attack Mother     Heart Attack Father 48    Heart Attack Maternal Grandmother     Heart Attack Other     Mult Sclerosis Sister          REVIEW OF SYSTEMS:    CONSTITUTIONAL:  negative for  fevers, chills, sweats and fatigue    RESPIRATORY:  negative for  dry cough, cough with sputum, dyspnea, wheezing and chest pain    CARDIOVASCULAR:  negative for chest pain, dyspnea, palpitations, syncope    GASTROINTESTINAL:  negative for nausea, vomiting, change in bowel habits, diarrhea, constipation and abdominal pain    MUSCULOSKELETAL: negative for muscle weakness    SKIN: negative for itching or rashes.     BEHAVIOR/PSYCH:  negative for poor appetite, increased appetite, decreased sleep and poor concentration    All other systems negative      PHYSICAL EXAM:    VITALS:  BP (!) 159/84   Pulse 94   Temp 98.6 °F (37 °C) (Temporal)   Resp 18   Ht 5' 5\" (1.651 m)   Wt 285 lb (129.3 kg)   LMP  (LMP Unknown)   SpO2 97%   BMI 47.43 kg/m²     CONSTITUTIONAL:  awake, alert, cooperative, no apparent distress, and appears stated age    EYES: PERRLA, EOMI    LUNGS:  No increased work of breathing, no audible wheezing    CARDIOVASCULAR:  regular rate and rhythm    ABDOMEN:  Soft non tender non distended     EXTREMITIES: no signs of clubbing or cyanosis. MUSCULOSKELETAL: negative for flaccid muscle tone or spastic movements. SKIN: gross examination reveals no signs of rashes, or diaphoresis. NEURO: Cranial nerves II-XII grossly intact. No signs of agitated mood. Assessment/Plan:    Patient  is here for lumbar TFESi for LE pain. The patient was counseled at length about the risks of king Covid-19 during their perioperative period and any recovery window from their procedure. The patient was made aware that king Covid-19  may worsen their prognosis for recovering from their procedure  and lend to a higher morbidity and/or mortality risk. All material risks, benefits, and reasonable alternatives including postponing the procedure were discussed. The patient does wish to proceed with the procedure at this time.       Chris Watkins MD

## 2022-02-07 ENCOUNTER — OFFICE VISIT (OUTPATIENT)
Dept: PAIN MANAGEMENT | Age: 52
End: 2022-02-07
Payer: MEDICARE

## 2022-02-07 VITALS
BODY MASS INDEX: 46.65 KG/M2 | TEMPERATURE: 97.9 F | HEIGHT: 65 IN | OXYGEN SATURATION: 98 % | WEIGHT: 280 LBS | SYSTOLIC BLOOD PRESSURE: 170 MMHG | RESPIRATION RATE: 16 BRPM | DIASTOLIC BLOOD PRESSURE: 88 MMHG | HEART RATE: 77 BPM

## 2022-02-07 DIAGNOSIS — G89.29 CHRONIC PAIN IN RIGHT FOOT: ICD-10-CM

## 2022-02-07 DIAGNOSIS — G57.71 COMPLEX REGIONAL PAIN SYNDROME TYPE 2 OF RIGHT LOWER EXTREMITY: ICD-10-CM

## 2022-02-07 DIAGNOSIS — M79.2 NEURALGIA AND NEURITIS: Primary | ICD-10-CM

## 2022-02-07 DIAGNOSIS — M79.671 CHRONIC PAIN IN RIGHT FOOT: ICD-10-CM

## 2022-02-07 DIAGNOSIS — G89.29 CHRONIC PAIN OF RIGHT ANKLE: ICD-10-CM

## 2022-02-07 DIAGNOSIS — M25.571 CHRONIC PAIN OF RIGHT ANKLE: ICD-10-CM

## 2022-02-07 PROCEDURE — 99213 OFFICE O/P EST LOW 20 MIN: CPT | Performed by: ANESTHESIOLOGY

## 2022-02-07 PROCEDURE — G8484 FLU IMMUNIZE NO ADMIN: HCPCS | Performed by: ANESTHESIOLOGY

## 2022-02-07 PROCEDURE — 3017F COLORECTAL CA SCREEN DOC REV: CPT | Performed by: ANESTHESIOLOGY

## 2022-02-07 PROCEDURE — G8417 CALC BMI ABV UP PARAM F/U: HCPCS | Performed by: ANESTHESIOLOGY

## 2022-02-07 PROCEDURE — 1036F TOBACCO NON-USER: CPT | Performed by: ANESTHESIOLOGY

## 2022-02-07 PROCEDURE — G8428 CUR MEDS NOT DOCUMENT: HCPCS | Performed by: ANESTHESIOLOGY

## 2022-02-07 RX ORDER — DULOXETIN HYDROCHLORIDE 60 MG/1
60 CAPSULE, DELAYED RELEASE ORAL NIGHTLY
Qty: 30 CAPSULE | Refills: 2 | Status: SHIPPED | OUTPATIENT
Start: 2022-02-07

## 2022-02-07 NOTE — PROGRESS NOTES
Do you currently have any of the following:    Fever: No  Headache:  No  Cough: No  Shortness of breath: No  Exposed to anyone with these symptoms: Hannah Chambers presents to the [address] on 2/7/2022. Yuridia is complaining of pain right foot . The pain is constant. The pain is described as tender, burning, gnawing and tingling. Pain is rated on her best day at a 5, on her worst day at a 8, and on average at a 7 on the VAS scale. She took her last dose of Lyrica and cymbalta . Yuridia does have issues with constipation. Any procedures since your last visit: No,     She is not on NSAIDS and  is not on anticoagulation medications to include none . Pacemaker or defibrillator: No.    Medication Contract and Consent for Opioid Use Documents Filed      No documents found                   BP (!) 170/88   Pulse 77   Temp 97.9 °F (36.6 °C) (Infrared)   Resp 16   Ht 5' 5\" (1.651 m)   Wt 280 lb (127 kg)   LMP  (LMP Unknown)   SpO2 98%   BMI 46.59 kg/m²      No LMP recorded (lmp unknown).  Patient is postmenopausal.

## 2022-02-07 NOTE — PROGRESS NOTES
CARISSA SANTOS [facility] - BEHAVIORAL HEALTH SERVICES Pain Management   [place], [address] [facility]  Dept: [phone]      Follow up Note      Omsav Sport     Date of Visit:  2/7/2022    CC:  Patient presents for follow up   Chief Complaint   Patient presents with    Follow-up    Other     right foot       HPI:  H/o fall in Feb 2018- and had ankle injury including tendon injury. Underwent surgery for the same.     Developed neuroma and subsequently had surgeries for neuroma removal. Complicated by infections, I & D.     Multiple modalities of treatment including- Medications, injections, PT.     Pain over the right lateral ankle area, foot- mainly over the lateral 3 toes.     Has numbness over the right lateral foot and lateral 3 toes. Tingling. Numbness. Burning. Intermittent color changes and swelling +.     Has been evaluated at [facility] pain clinic and [facility] Pain/ PMR and had recommended SCS. Patient is a S/P SCS trial- [employer] system. On 9/2/2021-did not notice significant improvement. TFESI- minimal benefit. Nursing notes and details of the pain history reviewed. Please see intake notes for details. Previous treatments:   Physical Therapy : yes, continues HEP      Medications: - NSAID's : yes                        - Membrane stabilizers : yes - Gabapentin- did not help, Lyrica.                      - Opioids : no chronic use.                       - Adjuvants or Others : yes - Cymbalta. Local compound cream.     TENS Unit: yes,     Surgeries: Yes- ankle surgery/ neuroma excision, I & D etc.     Interventions; Yes: Sympathetic block, SCS trial     She has not been on anticoagulation medications no.     She has not been on herbal supplements.       She is diabetic.      Employment: disability     Imaging:   MRI of right ankle: 12/2019:    IMPRESSION:  Residual osteochondral injury to the medial talar dome, associated   with the presumed postsurgical change in the neck of the talus.    Thickening of the anterior talofibular ligament also likely reflects   prior surgical repair. Tendinous and ligament structures are   otherwise grossly intact. No enhancing lesions.     MRI ANKLE WO [facility] RT3/23/2018  [facility]  Result Impression   IMPRESSION:    1.  SEVERE LATERAL ANKLE SPRAIN WITH INJURIES OF THE ANTERIOR TALOFIBULAR   AND CALCANEOFIBULAR LIGAMENTS.  THERE IS A BONE CONTUSION OF THE MEDIAL   ASPECT OF THE TALAR DOME. 2.  BONE CONTUSION OF THE CUBOID BONE. 3.  RIGHT ANKLE EFFUSION.       Xray of the right ankle: 2/2018:      Impression   A tiny bony fragment from the medial malleolus which could represent a   small avulsion injury.  No other displaced fractures are identified.       Soft tissue swelling                                            Potential Aberrant Drug-Related Behavior:no       Urine Drug Screening:no     OARRS report[de-identified]  Yes- reviewed: today                                          Past Medical History:   Diagnosis Date    Cancer ([facility] [address] 2012    basal cell scalp    Diabetes mellitus ([facility] [address] [facility]     Hyperlipidemia     Hypertension     Nerve pain     right foot    Obese     PONV (postoperative nausea and vomiting)     Seasonal allergies        Past Surgical History:   Procedure Laterality Date    ANESTHESIA NERVE BLOCK Right 6/18/2020    RIGHT LUMBAR SYMPATHETIC BLOCK UNDER FLUORO performed by Jaylan Abbott MD at [address] [facility]  years ago    removal cysts    NEUROMA SURGERY Right     done x 3 / last one 2/2019    PAIN MANAGEMENT PROCEDURE N/A 9/2/2021    SPINAL CORD STIMULATOR TRIAL WITH Jacquie Brand  (CPT 50552) performed by Jaylan Abbott MD at [address] [facility] Right 12/2/2021    LUMBAR TRANSFORAMINAL EPIDURAL STEROID INJECTION RIGHT L5 AND S1 UNDER FLUOROSCOPIC GUIDANCE performed by Jaylan Abbott MD at [address] Left     rotator cuff done x 3 / last one 2012    SKIN CANCER EXCISION  2012    basal cell ca scalp excision       Prior to Admission medications    Medication Sig Start Date End Date Taking? Authorizing Provider   vitamin D (ERGOCALCIFEROL) 1.25 MG (79026 UT) CAPS capsule Take 50,000 Units by mouth once a week   Yes Historical Provider, MD   Semaglutide,0.25 or 0.5MG/DOS, (OZEMPIC, 0.25 OR 0.5 MG/DOSE,) 2 MG/1.5ML SOPN Inject 0.5 mg into the skin once a week Takes every Thursday   Yes Historical Provider, MD   DULoxetine (CYMBALTA) 60 MG extended release capsule Take 60 mg by mouth nightly    Yes Historical Provider, MD   atorvastatin (LIPITOR) 40 MG tablet Take 60 mg by mouth nightly  5/22/20  Yes Historical Provider, MD   insulin glargine (LANTUS SOLOSTAR) 100 UNIT/ML injection pen Inject 25 Units into the skin nightly  Patient taking differently: Inject 70 Units into the skin nightly  3/22/19  Yes Karen Rojas MD   insulin lispro (HUMALOG KWIKPEN) 100 UNIT/ML pen Inject 4 Units into the skin 3 times daily (before meals)  Patient taking differently: Inject 8 Units into the skin 3 times daily (before meals)  3/22/19  Yes Irma Grubbs MD   fluticasone (FLONASE) 50 MCG/ACT nasal spray 1 spray by Each Nare route daily 3/23/19  Yes Karen Rojas MD   Lancets MISC 1 each by Does not apply route daily 3/20/19  Yes Irma Grubbs MD   Insulin Pen Needle 31G X 6 MM MISC 1 each by Does not apply route daily 3/20/19  Yes Karen Rojas MD   losartan (COZAAR) 25 MG tablet Take 50 mg by mouth nightly    Yes Historical Provider, MD   pregabalin (LYRICA) 75 MG capsule Take 1 capsule by mouth 2 times daily for 60 days.  11/11/21 1/10/22  Haris Ruggiero MD       No Known Allergies    Social History     Socioeconomic History    Marital status:      Spouse name: Not on file    Number of children: Not on file    Years of education: Not on file    Highest education level: Not on file   Occupational History    Not on file   Tobacco Use    Smoking status: Former Smoker     Packs/day: 1.00     Years: 12.00     Pack years: 12.00     Types: Cigarettes     Quit date: 6/15/2005     Years since quittin.6    Smokeless tobacco: Never Used   Vaping Use    Vaping Use: Never used   Substance and Sexual Activity    Alcohol use: Yes     Comment: rarely    Drug use: Never    Sexual activity: Not on file   Other Topics Concern    Not on file   Social History Narrative    Not on file     Social Determinants of Health     Financial Resource Strain:     Difficulty of Paying Living Expenses: Not on file   Food Insecurity:     Worried About Running Out of Food in the Last Year: Not on file    Willy of Food in the Last Year: Not on file   Transportation Needs:     Lack of Transportation (Medical): Not on file    Lack of Transportation (Non-Medical): Not on file   Physical Activity:     Days of Exercise per Week: Not on file    Minutes of Exercise per Session: Not on file   Stress:     Feeling of Stress : Not on file   Social Connections:     Frequency of Communication with Friends and Family: Not on file    Frequency of Social Gatherings with Friends and Family: Not on file    Attends Restorationist Services: Not on file    Active Member of [address] [place] [address] or Organizations: Not on file    Attends Club or Organization Meetings: Not on file    Marital Status: Not on file   Intimate Partner Violence:     Fear of Current or Ex-Partner: Not on file    Emotionally Abused: Not on file    Physically Abused: Not on file    Sexually Abused: Not on file   Housing Stability:     Unable to Pay for Housing in the Last Year: Not on file    Number of [place] in the Last Year: Not on file    Unstable Housing in the Last Year: Not on file       Family History   Problem Relation Age of Onset    Heart Attack Mother     Heart Attack Father 48    Heart Attack Maternal Grandmother     Heart Attack Other     Mult Sclerosis Sister        REVIEW OF SYSTEMS:     Eva Mon denies fever/chills, chest pain, shortness of breath, new bowel or bladder complaints.  All other review of systems was negative. PHYSICAL EXAMINATION:      BP (!) 170/88   Pulse 77   Temp 97.9 °F (36.6 °C) (Infrared)   Resp 16   Ht 5' 5\" (1.651 m)   Wt 280 lb (127 kg)   LMP  (LMP Unknown)   SpO2 98%   BMI 46.59 kg/m²   General:       General appearance:  Pleasant and well-hydrated, in no distress and A & O x 3  Build:Obese  Function: Rises from seated position easily and Moves about room with difficulty     HEENT:     Head:normocephalic, atraumatic   Lungs:     Breathing:normal breathing pattern     Abdomen:     Shape:non-distended and normal     Cervical spine:     Inspection:normal     Thoracic spine:                Range of motion:normal in flexion, extension rotation bilateral and is not painful.     Lumbar spine:     Range of motion: Decreased, flexion Decreased, Lateral bending, extension and rotation bilaterally reduced is not painful.     Musculoskeletal:     Shoulder ROM -appears intact   Extremities:     Tremors:None bilaterally upper and lower     Right ankle/ foot:  Scar form the prior surgery - healed well  Ankle ROM reduced  Some swelling noted   Dysesthesia/ allodynia + right distal LE. Assessment/Plan:    Diagnosis Orders   1. Chronic pain of right ankle      2. Chronic pain in right foot      3. Neuralgia and neuritis      4. Complex regional pain syndrome type 2 of right lower extremity        H/o fall in 2018 following which she had right ankle injury / tendon injury. S/P surgery for the same. Had developed neuroma and subsequently had surgeries for neuroma treatment- complicated by infection for which she had undergone I & D.     Having pain mainly over the right lateral ankle, foot, lateral three toes and numbness.  Intermittent swelling and color changes +.     Failed multiple modalities of treatment including meds/ PT/ local injection, Lumbar sympathetic block etc.     Features of neuropathic pain/ CRPS -type 2.   S/P SCS trial- [employer] system: No significant improvement. - was on Lyrica to 75 mg BID- she ran out of it and has stopped. Will revisit this later. - She has stopped Cymbalta will restart. - Compound cream for local use. Script given. Consider  low frequency SCS trial ([employer])/PNS/ ITP- in future. F/U in 6-8 weeks. Note:  diagnosed with cancer recently and is worked up by oncology. In a lot of stress.      Anne-Marie Sims MD    CC:  KENNY ELIZABETH III, DO

## 2022-04-13 ENCOUNTER — OFFICE VISIT (OUTPATIENT)
Dept: PAIN MANAGEMENT | Age: 52
End: 2022-04-13
Payer: MEDICARE

## 2022-04-13 VITALS
OXYGEN SATURATION: 98 % | DIASTOLIC BLOOD PRESSURE: 72 MMHG | RESPIRATION RATE: 16 BRPM | SYSTOLIC BLOOD PRESSURE: 120 MMHG | WEIGHT: 280 LBS | HEIGHT: 65 IN | TEMPERATURE: 97.5 F | HEART RATE: 93 BPM | BODY MASS INDEX: 46.65 KG/M2

## 2022-04-13 DIAGNOSIS — M79.671 CHRONIC PAIN IN RIGHT FOOT: ICD-10-CM

## 2022-04-13 DIAGNOSIS — M79.2 NEURALGIA AND NEURITIS: ICD-10-CM

## 2022-04-13 DIAGNOSIS — G89.29 CHRONIC PAIN IN RIGHT FOOT: ICD-10-CM

## 2022-04-13 DIAGNOSIS — G57.71 COMPLEX REGIONAL PAIN SYNDROME TYPE 2 OF RIGHT LOWER EXTREMITY: Primary | ICD-10-CM

## 2022-04-13 DIAGNOSIS — G89.29 CHRONIC PAIN OF RIGHT ANKLE: ICD-10-CM

## 2022-04-13 DIAGNOSIS — M25.571 CHRONIC PAIN OF RIGHT ANKLE: ICD-10-CM

## 2022-04-13 PROCEDURE — 99213 OFFICE O/P EST LOW 20 MIN: CPT | Performed by: ANESTHESIOLOGY

## 2022-04-13 PROCEDURE — G8427 DOCREV CUR MEDS BY ELIG CLIN: HCPCS | Performed by: ANESTHESIOLOGY

## 2022-04-13 PROCEDURE — 3017F COLORECTAL CA SCREEN DOC REV: CPT | Performed by: ANESTHESIOLOGY

## 2022-04-13 PROCEDURE — G8417 CALC BMI ABV UP PARAM F/U: HCPCS | Performed by: ANESTHESIOLOGY

## 2022-04-13 PROCEDURE — 99214 OFFICE O/P EST MOD 30 MIN: CPT | Performed by: ANESTHESIOLOGY

## 2022-04-13 PROCEDURE — 1036F TOBACCO NON-USER: CPT | Performed by: ANESTHESIOLOGY

## 2022-04-13 RX ORDER — LOSARTAN POTASSIUM 50 MG/1
TABLET ORAL
COMMUNITY
Start: 2022-02-04 | End: 2022-04-13

## 2022-04-13 RX ORDER — PEN NEEDLE, DIABETIC 31 GX5/16"
NEEDLE, DISPOSABLE MISCELLANEOUS
COMMUNITY
Start: 2022-02-22

## 2022-04-13 RX ORDER — INSULIN LISPRO 100 [IU]/ML
INJECTION, SOLUTION INTRAVENOUS; SUBCUTANEOUS
COMMUNITY
Start: 2022-03-14 | End: 2022-04-13

## 2022-04-13 RX ORDER — CHOLECALCIFEROL (VITAMIN D3) 1250 MCG
CAPSULE ORAL
COMMUNITY
Start: 2022-02-10 | End: 2022-05-31 | Stop reason: ALTCHOICE

## 2022-04-13 RX ORDER — SEMAGLUTIDE 1.34 MG/ML
INJECTION, SOLUTION SUBCUTANEOUS
COMMUNITY
Start: 2022-02-25 | End: 2022-05-31 | Stop reason: ALTCHOICE

## 2022-04-13 RX ORDER — LOSARTAN POTASSIUM AND HYDROCHLOROTHIAZIDE 25; 100 MG/1; MG/1
TABLET ORAL
COMMUNITY
Start: 2022-02-21

## 2022-04-13 RX ORDER — AMLODIPINE BESYLATE 10 MG/1
10 TABLET ORAL NIGHTLY
COMMUNITY
Start: 2022-03-11

## 2022-04-13 NOTE — PROGRESS NOTES
CARISSA MIRANDA [facility] - BEHAVIORAL HEALTH SERVICES Pain Management   [place], [address]  Dept: [phone]      Follow up Note      Karen Rhonda     Date of Visit:  4/13/2022    CC:  Patient presents for follow up   Chief Complaint   Patient presents with    Leg Pain     right leg calf to her toes       HPI:  H/o fall in Feb 2018- and had ankle injury including tendon injury. Underwent surgery for the same.     Developed neuroma and subsequently had surgeries for neuroma removal. Complicated by infections, I & D.     Multiple modalities of treatment including- Medications, injections, PT.     Pain over the right lateral ankle area, foot- mainly over the lateral 3 toes.     Has numbness over the right lateral foot and lateral 3 toes. Tingling. Numbness. Burning. Intermittent color changes and swelling +.     S/P Lumbar sympathetic block / TFESI/ SNRB- minimal benefit. Prior HF trial- no improvement. Nursing notes and details of the pain history reviewed. Please see intake notes for details. Previous treatments:   Physical Therapy : yes, continues HEP      Medications: - NSAID's : yes                        - Membrane stabilizers : yes - Gabapentin- did not help, Lyrica.                      - Opioids : no chronic use.                       - Adjuvants or Others : yes - Cymbalta. Local compound cream.     TENS Unit: yes,     Surgeries: Yes- ankle surgery/ neuroma excision, I & D etc.     Interventions; Yes     She has not been on anticoagulation medications no.     She has not been on herbal supplements.       She is diabetic.      Employment: disability     Imaging:   MRI of right ankle: 12/2019:    IMPRESSION:  Residual osteochondral injury to the medial talar dome, associated   with the presumed postsurgical change in the neck of the talus. Thickening of the anterior talofibular ligament also likely reflects   prior surgical repair. Tendinous and ligament structures are   otherwise grossly intact.  No enhancing lesions.     MRI ANKLE Ana Fonseca RT3/23/2018  [facility]  Result Impression   IMPRESSION:    1.  SEVERE LATERAL ANKLE SPRAIN WITH INJURIES OF THE ANTERIOR TALOFIBULAR   AND CALCANEOFIBULAR LIGAMENTS.  THERE IS A BONE CONTUSION OF THE MEDIAL   ASPECT OF THE TALAR DOME. 2.  BONE CONTUSION OF THE CUBOID BONE. 3.  RIGHT ANKLE EFFUSION.       Xray of the right ankle: 2/2018:      Impression   A tiny bony fragment from the medial malleolus which could represent a   small avulsion injury. No other displaced fractures are identified.       Soft tissue swelling                                            Potential Aberrant Drug-Related Behavior:no       Urine Drug Screening:no     OARRS report[de-identified]  Yes- reviewed: today                                          Past Medical History:   Diagnosis Date    Cancer ([facility] [address] 2012    basal cell scalp    Diabetes mellitus ([facility] [address]     Hyperlipidemia     Hypertension     Nerve pain     right foot    Obese     PONV (postoperative nausea and vomiting)     Seasonal allergies        Past Surgical History:   Procedure Laterality Date    ANESTHESIA NERVE BLOCK Right 6/18/2020    RIGHT LUMBAR SYMPATHETIC BLOCK UNDER FLUORO performed by Sukhdev Redmond MD at [facility]  years ago    removal cysts    NEUROMA SURGERY Right     done x 3 / last one 2/2019    PAIN MANAGEMENT PROCEDURE N/A 9/2/2021    SPINAL CORD STIMULATOR TRIAL WITH Joseph Silva  (CPT 26018) performed by Sukhdev Redmond MD at [place] [address] Right 12/2/2021    LUMBAR TRANSFORAMINAL EPIDURAL STEROID INJECTION RIGHT L5 AND S1 UNDER FLUOROSCOPIC GUIDANCE performed by Sukhdev Redmond MD at [address] [place] [address] Left     rotator cuff done x 3 / last one 2012    SKIN CANCER EXCISION  2012    basal cell ca scalp excision       Prior to Admission medications    Medication Sig Start Date End Date Taking?  Authorizing Provider   amLODIPine (NORVASC) 10 MG tablet TAKE 1 TABLET BY MOUTH EVERY DAY 3/11/22  Yes Historical Provider, MD   Cholecalciferol (VITAMIN D3) 1.25 MG (38793 UT) CAPS TAKE 1 CAPSULE BY MOUTH EVERY WEEK 2/10/22  Yes Historical Provider, MD   losartan-hydroCHLOROthiazide (HYZAAR) 100-25 MG per tablet TAKE 1 TABLET BY MOUTH EVERY DAY 2/21/22  Yes Historical Provider, MD STODDARD UF III MINI PEN NEEDLES 31G X 5 MM MISC USE 4 TIMES A DAY 2/22/22  Yes Historical Provider, MD   OZEMPIC, 1 MG/DOSE, 4 MG/3ML SOPN INJECT 1 MG SUBCUTANEOUSLY WEEKLY 2/25/22  Yes Historical Provider, MD   DULoxetine (CYMBALTA) 60 MG extended release capsule Take 1 capsule by mouth nightly 2/7/22  Yes Nilo Armstrong MD   atorvastatin (LIPITOR) 40 MG tablet Take 60 mg by mouth nightly  5/22/20  Yes Historical Provider, MD   insulin glargine (LANTUS SOLOSTAR) 100 UNIT/ML injection pen Inject 25 Units into the skin nightly  Patient taking differently: Inject 70 Units into the skin nightly  3/22/19  Yes Nai Zhang MD   insulin lispro (HUMALOG KWIKPEN) 100 UNIT/ML pen Inject 4 Units into the skin 3 times daily (before meals)  Patient taking differently: Inject 8 Units into the skin 3 times daily (before meals)  3/22/19  Yes Irma Grubbs MD   fluticasone (FLONASE) 50 MCG/ACT nasal spray 1 spray by Each Nare route daily 3/23/19  Yes Nai Zhang MD   Lancets MISC 1 each by Does not apply route daily 3/20/19  Yes Irma Grubbs MD   Insulin Pen Needle 31G X 6 MM MISC 1 each by Does not apply route daily 3/20/19  Yes Irma Grubbs MD   vitamin D (ERGOCALCIFEROL) 1.25 MG (39936 UT) CAPS capsule Take 50,000 Units by mouth once a week    Historical Provider, MD   pregabalin (LYRICA) 75 MG capsule Take 1 capsule by mouth 2 times daily for 60 days.  11/11/21 1/10/22  Nilo Armstrong MD   Semaglutide,0.25 or 0.5MG/DOS, (OZEMPIC, 0.25 OR 0.5 MG/DOSE,) 2 MG/1.5ML SOPN Inject 0.5 mg into the skin once a week Takes every Thursday    Historical Provider, MD       No Known Allergies    Social History     Socioeconomic History    Marital status:      Spouse name: Not on file    Number of children: Not on file    Years of education: Not on file    Highest education level: Not on file   Occupational History    Not on file   Tobacco Use    Smoking status: Former Smoker     Packs/day: 1.00     Years: 12.00     Pack years: 12.00     Types: Cigarettes     Quit date: 6/15/2005     Years since quittin.8    Smokeless tobacco: Never Used   Vaping Use    Vaping Use: Never used   Substance and Sexual Activity    Alcohol use: Yes     Comment: rarely    Drug use: Never    Sexual activity: Not on file   Other Topics Concern    Not on file   Social History Narrative    Not on file     Social Determinants of Health     Financial Resource Strain:     Difficulty of Paying Living Expenses: Not on file   Food Insecurity:     Worried About [employer] in the Last Year: Not on file    Willy of Food in the Last Year: Not on file   Transportation Needs:     Lack of Transportation (Medical): Not on file    Lack of Transportation (Non-Medical):  Not on file   Physical Activity:     Days of Exercise per Week: Not on file    Minutes of Exercise per Session: Not on file   Stress:     Feeling of Stress : Not on file   Social Connections:     Frequency of Communication with Friends and Family: Not on file    Frequency of Social Gatherings with Friends and Family: Not on file    Attends Confucianism Services: Not on file    Active Member of [address] [place] [address] or Organizations: Not on file    Attends Club or Organization Meetings: Not on file    Marital Status: Not on file   Intimate Partner Violence:     Fear of Current or Ex-Partner: Not on file    Emotionally Abused: Not on file    Physically Abused: Not on file    Sexually Abused: Not on file   Housing Stability:     Unable to Pay for Housing in the Last Year: Not on file    Number of [place] in the Last Year: Not on file    Unstable Housing in the Last Year: Not on file Family History   Problem Relation Age of Onset    Heart Attack Mother     Heart Attack Father 48    Heart Attack Maternal Grandmother     Heart Attack Other     Mult Sclerosis Sister        REVIEW OF SYSTEMS:     Raul Hodge denies fever/chills, chest pain, shortness of breath, new bowel or bladder complaints. All other review of systems was negative. PHYSICAL EXAMINATION:      /72   Pulse 93   Temp 97.5 °F (36.4 °C) (Infrared)   Resp 16   Ht 5' 5\" (1.651 m)   Wt 280 lb (127 kg)   LMP  (LMP Unknown)   SpO2 98%   BMI 46.59 kg/m²   General:       General appearance:  Pleasant and well-hydrated, in no distress and A & O x 3  Build:Obese  Function: Rises from seated position easily and Moves about room with difficulty     HEENT:     Head:normocephalic, atraumatic   Lungs:     Breathing:normal breathing pattern     Abdomen:     Shape:non-distended and normal     Cervical spine:     Inspection:normal     Thoracic spine:                Range of motion:normal in flexion, extension rotation bilateral and is not painful.     Lumbar spine:     Range of motion: Decreased, flexion Decreased, Lateral bending, extension and rotation bilaterally reduced is not painful.     Musculoskeletal:     Shoulder ROM -appears intact   Extremities:     Tremors:None bilaterally upper and lower     Right ankle/ foot:  Scar form the prior surgery - healed well  Ankle ROM reduced  Some swelling noted   Dysesthesia/ allodynia + right distal LE. Assessment/Plan:    Diagnosis Orders   1. Chronic pain of right ankle      2. Chronic pain in right foot      3. Neuralgia and neuritis      4. Complex regional pain syndrome type 2 of right lower extremity        H/o fall in 2018 following which she had right ankle injury / tendon injury. S/P surgery for the same.   Had developed neuroma and subsequently had surgeries for neuroma treatment- complicated by infection for which she had undergone I & D.     Having pain mainly over the right lateral ankle, foot, lateral three toes and numbness. Intermittent swelling and color changes +.     Failed multiple modalities of treatment including meds/ PT/ local injection, Lumbar sympathetic block/ SNRB etc.     Features of neuropathic pain/ CRPS -type 2.     Stopped Lyrica  On Cymbalta    Compound cream for local use. Script given. Will schedule for SCS trial with [facility] system. RBA discussed. Detailed discussion of the trial process and implant process done. Prior psych eval- reviewed no contraindication. Note:  diagnosed with cancer recently and is worked up by oncology. In a lot of stress.      Abdoul Harmon MD    CC:  KENNY ELIZABETH III, DO

## 2022-04-13 NOTE — PROGRESS NOTES
Do you currently have any of the following:    Fever: No  Headache:  No  Cough: No  Shortness of breath: No  Exposed to anyone with these symptoms: No                                                                                                                [place] Camila presents to the [address] [address] on 4/13/2022. Marichuy Welch is complaining of pain right leg. The pain is constant. The pain is described as aching and burning. Pain is rated on her best day at a 5, on her worst day at a 10, and on average at a 7 on the VAS scale. She took her last dose of cymbalta,advil . Marichuy Welch does not have issues with constipation. Any procedures since your last visit: No,      She is  on NSAIDS and  is not on anticoagulation medications to include none and is managed by NA. Pacemaker or defibrillator: No Physician managing device is NA. Medication Contract and Consent for Opioid Use Documents Filed      No documents found                   /72   Pulse 93   Temp 97.5 °F (36.4 °C) (Infrared)   Resp 16   Ht 5' 5\" (1.651 m)   Wt 280 lb (127 kg)   LMP  (LMP Unknown)   SpO2 98%   BMI 46.59 kg/m²      No LMP recorded (lmp unknown).  Patient is postmenopausal.

## 2022-04-28 ENCOUNTER — TELEPHONE (OUTPATIENT)
Dept: PAIN MANAGEMENT | Age: 52
End: 2022-04-28

## 2022-04-28 ENCOUNTER — PREP FOR PROCEDURE (OUTPATIENT)
Dept: PAIN MANAGEMENT | Age: 52
End: 2022-04-28

## 2022-04-28 RX ORDER — SODIUM CHLORIDE, SODIUM LACTATE, POTASSIUM CHLORIDE, CALCIUM CHLORIDE 600; 310; 30; 20 MG/100ML; MG/100ML; MG/100ML; MG/100ML
INJECTION, SOLUTION INTRAVENOUS CONTINUOUS
Status: CANCELLED | OUTPATIENT
Start: 2022-04-28

## 2022-04-28 NOTE — TELEPHONE ENCOUNTER
Call to Karen Ellis that spinal cord stimulator trial was approved for 6/2/2022 and that the surgery center should call her a few days before for the pre op call and after 3:00 PM the business day before with the arrival time. Instructed Tiago Presley to hold ibuprofen for 24 hours, naprosyn for 4 days before the trial begins and before the follow up lead removal and any aspirin containing products for 7 days before and also during the 7 days of the trial for 14 days total.  Instructed to avoid any dental work during the trial and to sponge bath only. Instructed to call office back if any questions. Tiago Presley verbalized understanding.

## 2022-05-31 NOTE — PROGRESS NOTES
[facility] PRE-ADMISSION TESTING INSTRUCTIONS    The Preadmission Testing patient is instructed accordingly using the following criteria (check applicable):    ARRIVAL INSTRUCTIONS:  [x] Parking the day of Surgery is located in the Main Entrance lot. Upon entering the door, make an immediate right to the surgery reception desk    [x] Bring photo ID and insurance card    [] Bring in a copy of Living will or Durable Power of  papers. [x] Please be sure to arrange for responsible adult to provide transportation to and from the hospital    [x] Please arrange for responsible adult to be with you for the 24 hour period post procedure due to having anesthesia    [x] If you awake am of surgery not feeling well or have temperature >100 please call [phone]    GENERAL INSTRUCTIONS:    [x] Nothing by mouth after midnight, including gum, candy, mints or water    [x] You may brush your teeth, but do not swallow any water    [x] Take medications as instructed with 1-2 oz of water    [x] Stop herbal supplements and vitamins 5 days prior to procedure    [] Follow preop dosing of blood thinners per physician instructions    [x] Take 1/2 dose of evening insulin, but no insulin after midnight    [] No oral diabetic medications after midnight    [x] If diabetic and have low blood sugar or feel symptomatic, take 1-2oz apple juice only    [] Bring inhalers day of surgery    [] Bring C-PAP/ Bi-Pap day of surgery    [] Bring urine specimen day of surgery    [x] Shower or bath with soap, lather and rinse well, AM of Surgery, no lotion, powders or creams to surgical site    [] Follow bowel prep as instructed per surgeon    [x] No tobacco products within 24 hours of surgery     [x] No alcohol or illegal drug use within 24 hours of surgery.     [x] Jewelry, body piercing's, eyeglasses, contact lenses and dentures are not permitted into surgery (bring cases)      [x] Please do not wear any nail polish, make up or hair products on the day of surgery    [x] You can expect a call the business day prior to procedure to notify you if your arrival time changes    [x] If you receive a survey after surgery we would greatly appreciate your comments    [] Parent/guardian of a minor must accompany their child and remain on the premises  the entire time they are under our care     [] Pediatric patients may bring favorite toy, blanket or comfort item with them    [] A caregiver or family member must remain with the patient during their stay if they are mentally handicapped, have dementia, disoriented or unable to use a call light or would be a safety concern if left unattended    [x] Please notify surgeon if you develop any illness between now and time of surgery (cold, cough, sore throat, fever, nausea, vomiting) or any signs of infections  including skin, wounds, and dental.    [x]  The Outpatient Pharmacy is available to fill your prescription here on your day of surgery, ask your preop nurse for details    [] Other instructions    EDUCATIONAL MATERIALS PROVIDED:    [] PAT Preoperative Education Packet/Booklet     [] Medication List    [] Transfusion bracelet applied with instructions    [] Shower with soap, lather and rinse well, and use CHG wipes provided the evening before surgery as instructed    [] Incentive spirometer with instructions

## 2022-06-02 ENCOUNTER — ANESTHESIA EVENT (OUTPATIENT)
Dept: OPERATING ROOM | Age: 52
End: 2022-06-02
Payer: MEDICARE

## 2022-06-02 ENCOUNTER — HOSPITAL ENCOUNTER (OUTPATIENT)
Age: 52
Setting detail: OUTPATIENT SURGERY
Discharge: HOME | End: 2022-06-02
Attending: ANESTHESIOLOGY | Admitting: ANESTHESIOLOGY
Payer: MEDICARE

## 2022-06-02 ENCOUNTER — APPOINTMENT (OUTPATIENT)
Dept: GENERAL RADIOLOGY | Age: 52
End: 2022-06-02
Attending: ANESTHESIOLOGY
Payer: MEDICARE

## 2022-06-02 ENCOUNTER — ANESTHESIA (OUTPATIENT)
Dept: OPERATING ROOM | Age: 52
End: 2022-06-02
Payer: MEDICARE

## 2022-06-02 VITALS
HEART RATE: 84 BPM | TEMPERATURE: 97.9 F | BODY MASS INDEX: 45.32 KG/M2 | OXYGEN SATURATION: 97 % | DIASTOLIC BLOOD PRESSURE: 74 MMHG | RESPIRATION RATE: 16 BRPM | WEIGHT: 272 LBS | SYSTOLIC BLOOD PRESSURE: 136 MMHG | HEIGHT: 65 IN

## 2022-06-02 DIAGNOSIS — G57.71 COMPLEX REGIONAL PAIN SYNDROME TYPE 2 OF RIGHT LOWER EXTREMITY: Primary | ICD-10-CM

## 2022-06-02 LAB — METER GLUCOSE: 137 MG/DL (ref 74–99)

## 2022-06-02 PROCEDURE — 2580000003 HC RX 258: Performed by: NURSE ANESTHETIST, CERTIFIED REGISTERED

## 2022-06-02 PROCEDURE — 6360000002 HC RX W HCPCS: Performed by: PHYSICIAN ASSISTANT

## 2022-06-02 PROCEDURE — 2500000003 HC RX 250 WO HCPCS: Performed by: ANESTHESIOLOGY

## 2022-06-02 PROCEDURE — 82962 GLUCOSE BLOOD TEST: CPT

## 2022-06-02 PROCEDURE — 3700000001 HC ADD 15 MINUTES (ANESTHESIA): Performed by: ANESTHESIOLOGY

## 2022-06-02 PROCEDURE — 3600000002 HC SURGERY LEVEL 2 BASE: Performed by: ANESTHESIOLOGY

## 2022-06-02 PROCEDURE — 2709999900 HC NON-CHARGEABLE SUPPLY: Performed by: ANESTHESIOLOGY

## 2022-06-02 PROCEDURE — 63650 IMPLANT NEUROELECTRODES: CPT | Performed by: ANESTHESIOLOGY

## 2022-06-02 PROCEDURE — 3600000012 HC SURGERY LEVEL 2 ADDTL 15MIN: Performed by: ANESTHESIOLOGY

## 2022-06-02 PROCEDURE — 7100000010 HC PHASE II RECOVERY - FIRST 15 MIN: Performed by: ANESTHESIOLOGY

## 2022-06-02 PROCEDURE — 3700000000 HC ANESTHESIA ATTENDED CARE: Performed by: ANESTHESIOLOGY

## 2022-06-02 PROCEDURE — 6360000002 HC RX W HCPCS

## 2022-06-02 PROCEDURE — 6360000002 HC RX W HCPCS: Performed by: NURSE ANESTHETIST, CERTIFIED REGISTERED

## 2022-06-02 PROCEDURE — 2720000001 HC MISC SURG SUPPLY STERILE $51-500: Performed by: ANESTHESIOLOGY

## 2022-06-02 PROCEDURE — 2580000003 HC RX 258: Performed by: PHYSICIAN ASSISTANT

## 2022-06-02 PROCEDURE — 3209999900 FLUORO FOR SURGICAL PROCEDURES

## 2022-06-02 PROCEDURE — C1778 LEAD, NEUROSTIMULATOR: HCPCS | Performed by: ANESTHESIOLOGY

## 2022-06-02 PROCEDURE — 7100000011 HC PHASE II RECOVERY - ADDTL 15 MIN: Performed by: ANESTHESIOLOGY

## 2022-06-02 DEVICE — LEAD NEUROSTIMULATOR 16 CNTCT L50CM TRL INFINION: Type: IMPLANTABLE DEVICE | Site: BACK | Status: FUNCTIONAL

## 2022-06-02 RX ORDER — MIDAZOLAM HYDROCHLORIDE 1 MG/ML
INJECTION INTRAMUSCULAR; INTRAVENOUS PRN
Status: DISCONTINUED | OUTPATIENT
Start: 2022-06-02 | End: 2022-06-02 | Stop reason: SDUPTHER

## 2022-06-02 RX ORDER — SODIUM CHLORIDE 9 MG/ML
INJECTION, SOLUTION INTRAVENOUS CONTINUOUS PRN
Status: DISCONTINUED | OUTPATIENT
Start: 2022-06-02 | End: 2022-06-02 | Stop reason: SDUPTHER

## 2022-06-02 RX ORDER — LIDOCAINE HYDROCHLORIDE 5 MG/ML
INJECTION, SOLUTION INFILTRATION; INTRAVENOUS PRN
Status: DISCONTINUED | OUTPATIENT
Start: 2022-06-02 | End: 2022-06-02 | Stop reason: ALTCHOICE

## 2022-06-02 RX ORDER — SODIUM CHLORIDE, SODIUM LACTATE, POTASSIUM CHLORIDE, CALCIUM CHLORIDE 600; 310; 30; 20 MG/100ML; MG/100ML; MG/100ML; MG/100ML
INJECTION, SOLUTION INTRAVENOUS CONTINUOUS
Status: DISCONTINUED | OUTPATIENT
Start: 2022-06-02 | End: 2022-06-02 | Stop reason: HOSPADM

## 2022-06-02 RX ORDER — OXYCODONE HYDROCHLORIDE AND ACETAMINOPHEN 5; 325 MG/1; MG/1
1 TABLET ORAL EVERY 8 HOURS PRN
Qty: 21 TABLET | Refills: 0 | Status: SHIPPED | OUTPATIENT
Start: 2022-06-02 | End: 2022-06-09

## 2022-06-02 RX ORDER — BUPIVACAINE HYDROCHLORIDE 2.5 MG/ML
INJECTION, SOLUTION EPIDURAL; INFILTRATION; INTRACAUDAL PRN
Status: DISCONTINUED | OUTPATIENT
Start: 2022-06-02 | End: 2022-06-02 | Stop reason: ALTCHOICE

## 2022-06-02 RX ORDER — FENTANYL CITRATE 50 UG/ML
INJECTION, SOLUTION INTRAMUSCULAR; INTRAVENOUS PRN
Status: DISCONTINUED | OUTPATIENT
Start: 2022-06-02 | End: 2022-06-02 | Stop reason: SDUPTHER

## 2022-06-02 RX ORDER — CEPHALEXIN 500 MG/1
500 CAPSULE ORAL 4 TIMES DAILY
Qty: 40 CAPSULE | Refills: 0 | Status: SHIPPED | OUTPATIENT
Start: 2022-06-02 | End: 2022-06-12

## 2022-06-02 RX ADMIN — MIDAZOLAM 2 MG: 1 INJECTION INTRAMUSCULAR; INTRAVENOUS at 08:59

## 2022-06-02 RX ADMIN — SODIUM CHLORIDE: 9 INJECTION, SOLUTION INTRAVENOUS at 08:19

## 2022-06-02 RX ADMIN — FENTANYL CITRATE 50 MCG: 50 INJECTION, SOLUTION INTRAMUSCULAR; INTRAVENOUS at 08:45

## 2022-06-02 RX ADMIN — FENTANYL CITRATE 100 MCG: 50 INJECTION, SOLUTION INTRAMUSCULAR; INTRAVENOUS at 09:49

## 2022-06-02 RX ADMIN — MIDAZOLAM 2 MG: 1 INJECTION INTRAMUSCULAR; INTRAVENOUS at 08:37

## 2022-06-02 RX ADMIN — CEFAZOLIN 3000 MG: 10 INJECTION, POWDER, FOR SOLUTION INTRAVENOUS at 08:37

## 2022-06-02 RX ADMIN — FENTANYL CITRATE 50 MCG: 50 INJECTION, SOLUTION INTRAMUSCULAR; INTRAVENOUS at 09:00

## 2022-06-02 RX ADMIN — MIDAZOLAM 2 MG: 1 INJECTION INTRAMUSCULAR; INTRAVENOUS at 08:42

## 2022-06-02 RX ADMIN — MIDAZOLAM 2 MG: 1 INJECTION INTRAMUSCULAR; INTRAVENOUS at 09:36

## 2022-06-02 ASSESSMENT — PAIN SCALES - GENERAL: PAINLEVEL_OUTOF10: 7

## 2022-06-02 ASSESSMENT — PAIN DESCRIPTION - LOCATION: LOCATION: LEG

## 2022-06-02 ASSESSMENT — LIFESTYLE VARIABLES: SMOKING_STATUS: 0

## 2022-06-02 ASSESSMENT — PAIN DESCRIPTION - ORIENTATION: ORIENTATION: RIGHT

## 2022-06-02 ASSESSMENT — PAIN DESCRIPTION - FREQUENCY: FREQUENCY: CONTINUOUS

## 2022-06-02 NOTE — H&P
[facility]      Procedure History & Physical      Nicole Lewis     HPI:    Patient  is here for SCS trial  for LE pain. Labs/imaging studies reviewed   All question and concerns addressed including R/B/A associated with the procedure    Past Medical History:   Diagnosis Date    Cancer ([facility] 2012    basal cell scalp    Diabetes mellitus ([facility]     Hyperlipidemia     Hypertension     Nerve pain     right foot    Obese     Seasonal allergies        Past Surgical History:   Procedure Laterality Date    ANESTHESIA NERVE BLOCK Right 6/18/2020    RIGHT LUMBAR SYMPATHETIC BLOCK UNDER FLUORO performed by Roldan Lantigua MD at [address] [place] [address]/[facility]  years ago    removal cysts    NEUROMA SURGERY Right     done x 3 / last one 2/2019    PAIN MANAGEMENT PROCEDURE N/A 9/2/2021    SPINAL CORD STIMULATOR TRIAL WITH Kenny Cary  (CPT 31593) performed by Roldan Lantigua MD at [facility] Right 12/2/2021    LUMBAR TRANSFORAMINAL EPIDURAL STEROID INJECTION RIGHT L5 AND S1 UNDER FLUOROSCOPIC GUIDANCE performed by Roldan Lantigua MD at [address] [place] [address] Left     rotator cuff done x 3 / last one 2012    SKIN CANCER EXCISION  2012    basal cell ca scalp excision       Prior to Admission medications    Medication Sig Start Date End Date Taking?  Authorizing Provider   amLODIPine (NORVASC) 10 MG tablet Take 10 mg by mouth at bedtime  3/11/22   Historical Provider, MD   losartan-hydroCHLOROthiazide (HYZAAR) 100-25 MG per tablet TAKE 1 TABLET BY MOUTH EVERY DAY 2/21/22   Historical Provider, MD STODDARD UF III MINI PEN NEEDLES 31G X 5 MM MISC USE 4 TIMES A DAY 2/22/22   Historical Provider, MD   DULoxetine (CYMBALTA) 60 MG extended release capsule Take 1 capsule by mouth nightly 2/7/22   Roldan Lantigua MD   vitamin D (ERGOCALCIFEROL) 1.25 MG (08055 UT) CAPS capsule Take 50,000 Units by mouth once a week    Historical Provider, MD   Semaglutide,0.25 or 0.5MG/DOS, (OZEMPIC, 0.25 OR 0.5 MG/DOSE,) 2 MG/1.5ML SOPN Inject 0.5 mg into the skin once a week Takes every Monday    Historical Provider, MD   atorvastatin (LIPITOR) 40 MG tablet Take 60 mg by mouth nightly  20   Historical Provider, MD   insulin glargine (LANTUS SOLOSTAR) 100 UNIT/ML injection pen Inject 25 Units into the skin nightly  Patient taking differently: Inject 70 Units into the skin nightly  3/22/19   Irma Grubbs MD   insulin lispro (HUMALOG KWIKPEN) 100 UNIT/ML pen Inject 4 Units into the skin 3 times daily (before meals)  Patient taking differently: Inject 8 Units into the skin 3 times daily (before meals)  3/22/19   Irma Grubbs MD   fluticasone (FLONASE) 50 MCG/ACT nasal spray 1 spray by Each Nare route daily 3/23/19   Neelam Flores MD   Lancets MISC 1 each by Does not apply route daily 3/20/19   Irma Grubbs MD   Insulin Pen Needle 31G X 6 MM MISC 1 each by Does not apply route daily 3/20/19   Neelam Flores MD       No Known Allergies    Social History     Socioeconomic History    Marital status:      Spouse name: Not on file    Number of children: Not on file    Years of education: Not on file    Highest education level: Not on file   Occupational History    Not on file   Tobacco Use    Smoking status: Former Smoker     Packs/day: 1.00     Years: 12.00     Pack years: 12.00     Types: Cigarettes     Quit date: 6/15/2005     Years since quittin.9    Smokeless tobacco: Never Used   Vaping Use    Vaping Use: Never used   Substance and Sexual Activity    Alcohol use: Yes     Comment: rarely    Drug use: Never    Sexual activity: Not on file   Other Topics Concern    Not on file   Social History Narrative    Not on file     Social Determinants of Health     Financial Resource Strain:     Difficulty of Paying Living Expenses: Not on file   Food Insecurity:     Worried About [address] in the Last Year: Not on file    [address] in the Last Year: Not on file   Transportation Needs:     Lack of Transportation (Medical): Not on file    Lack of Transportation (Non-Medical): Not on file   Physical Activity:     Days of Exercise per Week: Not on file    Minutes of Exercise per Session: Not on file   Stress:     Feeling of Stress : Not on file   Social Connections:     Frequency of Communication with Friends and Family: Not on file    Frequency of Social Gatherings with Friends and Family: Not on file    Attends Alevism Services: Not on file    Active Member of [address] [place] [address] [employer] or Organizations: Not on file    Attends Club or Organization Meetings: Not on file    Marital Status: Not on file   Intimate Partner Violence:     Fear of Current or Ex-Partner: Not on file    Emotionally Abused: Not on file    Physically Abused: Not on file    Sexually Abused: Not on file   Housing Stability:     Unable to Pay for Housing in the Last Year: Not on file    Number of [place] in the Last Year: Not on file    Unstable Housing in the Last Year: Not on file       Family History   Problem Relation Age of Onset    Heart Attack Mother     Heart Attack Father 48    Heart Attack Maternal Grandmother     Heart Attack Other     Mult Sclerosis Sister          REVIEW OF SYSTEMS:    CONSTITUTIONAL:  negative for  fevers, chills, sweats and fatigue    RESPIRATORY:  negative for  dry cough, cough with sputum, dyspnea, wheezing and chest pain    CARDIOVASCULAR:  negative for chest pain, dyspnea, palpitations, syncope    GASTROINTESTINAL:  negative for nausea, vomiting, change in bowel habits, diarrhea, constipation and abdominal pain    MUSCULOSKELETAL: negative for muscle weakness    SKIN: negative for itching or rashes.     BEHAVIOR/PSYCH:  negative for poor appetite, increased appetite, decreased sleep and poor concentration    All other systems negative      PHYSICAL EXAM:    VITALS:  /60   Pulse 82   Temp 97.9 °F (36.6 °C) (Temporal)   Resp 16   Ht 5' 5\" (1.651 m) Wt 272 lb (123.4 kg)   LMP  (LMP Unknown)   SpO2 96%   BMI 45.26 kg/m²     CONSTITUTIONAL:  awake, alert, cooperative, no apparent distress, and appears stated age    EYES: PERRLA, EOMI    LUNGS:  No increased work of breathing, no audible wheezing    CARDIOVASCULAR:  regular rate and rhythm    ABDOMEN:  Soft non tender non distended     EXTREMITIES: no signs of clubbing or cyanosis. MUSCULOSKELETAL: negative for flaccid muscle tone or spastic movements. SKIN: gross examination reveals no signs of rashes, or diaphoresis. NEURO: Cranial nerves II-XII grossly intact. No signs of agitated mood. Assessment/Plan:    Patient  is here for SCS trial  for LE pain. The patient was counseled at length about the risks of king Covid-19 during their perioperative period and any recovery window from their procedure. The patient was made aware that king Covid-19  may worsen their prognosis for recovering from their procedure  and lend to a higher morbidity and/or mortality risk. All material risks, benefits, and reasonable alternatives including postponing the procedure were discussed. The patient does wish to proceed with the procedure at this time.       Medhat Cintron MD

## 2022-06-02 NOTE — OP NOTE
Operative Note      Patient: René Orozco  YOB: 1970  MRN: [id number]    Date of Procedure: 2022    Pre-Op Diagnosis: COMPLEX REGIONAL PAIN SYNDROME, Foot pain/ ankle pain, neuralgia neuritis    Post-Op Diagnosis: Same       Procedure(s):  SPINAL CORD STIMULATOR TRIAL WITH [employer] X 2 Infinion leads    Surgeon(s):  Sincere Degroot MD    Assistant:   * No surgical staff found *    Anesthesia: Monitor Anesthesia Care    Estimated Blood Loss (mL): Minimal    Complications: None    Specimens:   * No specimens in log *    Implants:  Implant Name Type Inv. Item Serial No.  Lot No. LRB No. Used Action   KIT LD TRL L50CM SPNL CRD PERC 16 CNTCT W IMAG RDY MRI FULL - [id number]  KIT LD TRL L50CM SPNL CRD PERC 16 CNTCT W IMAG RDY MRI FULL [id number] [employer]WD  Left 1 Implanted   LEAD NEUROSTIMULATOR 16 CNTCT L50CM TRL INFINION - [id number]  LEAD NEUROSTIMULATOR 16 CNTCT L50CM TRL INFINION [id number] [employer] NEUROMODULATION-WD  Right 1 Implanted         Drains: * No LDAs found *    Findings: good needle placement    Detailed Description of Procedure:   2022    Patient: René Orozco  :  1970  Age:  45 y.o. Sex:  female     PRE-OPERATIVE DIAGNOSIS:   COMPLEX REGIONAL PAIN SYNDROME, Foot pain/ ankle pain, neuralgia neuritis    POST-OPERATIVE DIAGNOSIS: Same      PROCEDURE: Fluoroscopic guided spinal cord stimulator trial.    COMPANY: [employer]    SURGEON: Sincere Degroot MD    ANESTHESIA: MAC    ESTIMATED BLOOD LOSS: None.  ______________________________________________________________________    BRIEF HISTORY: René Orozco comes in today for spinal cord stimulator trial under fluoroscopic guidance. The potential complications of this procedure were discussed with her again today. She has elected to undergo the aforementioned procedure. Karmen complete History & Physical examination were reviewed in depth, a copy of which is in the chart.       DESCRIPTION OF PROCEDURE:   After confirming written and informed consent, a time-out was performed and Yancys name and date of birth, the procedure to be performed as well as the plan for the location of the needle insertion were confirmed. The patient was brought into the procedure room and placed in the prone position on the fluoroscopy table. A pillow was placed under the patient's abdomen to increase lumbar interlaminar space. Standard monitors were placed, and vital signs were observed throughout the procedure. The area of the lumbar spine was prepped with chloraprep and draped in a sterile manner. The L1-L2 interspace was identified and marked under AP fluoroscopy. The skin and subcutaneous tissues at the above level were anesthestized with 0.5% lidocaine. TWO # 14 gauge inch tuohy epidural needle was advanced with a paramedian approach from the above mentioned levels with loss of resistance technique to identify the entrance into the epidural space. Negative aspiration was confirmed. The octad leads were advanced under fluoroscopic guidance. The leads were advanced: right lead extending from top of T11 to mid of L1 , left sided lead extending from bottom of T11 to the top of L1-2 interspace. The leads were connected to an external pulse generator using sterile connectors. Several combinations of electrode configuration, frequency, amplitude and pulse width were used until comfortable, appropriate coverage of the patient's pain area was obtained. The needles(s) were then carefully removed under live fluoroscopy and the lead(s) were secured to the skin using plastic twist-lock anchors and 2-0 Ethilon sutures. Fluoroscopy was used to confirm continued proper placement of the lead(s) before being anchored . The anchors were covered with a gauze dressing under the anchor(s) to pad the skin. The connectors were also wrapped in gauze and secured to the patient.   Patient back was then cleansed and paper tape were placed to cover the leads and the connectors, more programming was performed in the recovery area with the device representative. NOTE: accessory rib noted at the level of L1. Disposition the patient tolerated the procedure well and there were no complications . Vital signs remained stable throughout the procedure. The patient was escorted to the recovery area where they remained until discharge and written discharge instructions for the procedure were given. Plan: Jacinda Richard will return to our pain management center as scheduled.      Cruz Robles MD

## 2022-06-02 NOTE — ANESTHESIA PRE PROCEDURE
Department of Anesthesiology  Preprocedure Note       Name:  Young Castillo   Age:  45 y.o.  :  1970                                          MRN:  [id number]         Date:  2022      Surgeon: Leidy Barbosa):  Pavan Feldman MD    Procedure: Procedure(s):  SPINAL CORD STIMULATOR TRIAL WITH [employer]    Medications prior to admission:   Prior to Admission medications    Medication Sig Start Date End Date Taking?  Authorizing Provider   amLODIPine (NORVASC) 10 MG tablet Take 10 mg by mouth at bedtime  3/11/22   Historical Provider, MD   losartan-hydroCHLOROthiazide (HYZAAR) 100-25 MG per tablet TAKE 1 TABLET BY MOUTH EVERY DAY 22   Historical Provider, MD STODDARD UF III MINI PEN NEEDLES 31G X 5 MM MISC USE 4 TIMES A DAY 22   Historical Provider, MD   DULoxetine (CYMBALTA) 60 MG extended release capsule Take 1 capsule by mouth nightly 22   Pavan Feldman MD   vitamin D (ERGOCALCIFEROL) 1.25 MG (24452 UT) CAPS capsule Take 50,000 Units by mouth once a week    Historical Provider, MD   Semaglutide,0.25 or 0.5MG/DOS, (OZEMPIC, 0.25 OR 0.5 MG/DOSE,) 2 MG/1.5ML SOPN Inject 0.5 mg into the skin once a week Takes every Monday    Historical Provider, MD   atorvastatin (LIPITOR) 40 MG tablet Take 60 mg by mouth nightly  20   Historical Provider, MD   insulin glargine (LANTUS SOLOSTAR) 100 UNIT/ML injection pen Inject 25 Units into the skin nightly  Patient taking differently: Inject 70 Units into the skin nightly  3/22/19   Irma Grubbs MD   insulin lispro (HUMALOG KWIKPEN) 100 UNIT/ML pen Inject 4 Units into the skin 3 times daily (before meals)  Patient taking differently: Inject 8 Units into the skin 3 times daily (before meals)  3/22/19   Irma Grubbs MD   fluticasone (FLONASE) 50 MCG/ACT nasal spray 1 spray by Each Nare route daily 3/23/19   Goyo Williamson MD   Lancets MISC 1 each by Does not apply route daily 3/20/19   Irma Grubbs MD   Insulin Pen Needle 31G X 6 MM MISC 1 each by Does not apply route daily 3/20/19   Irma Grubbs MD       Current medications:    Current Facility-Administered Medications   Medication Dose Route Frequency Provider Last Rate Last Admin    ceFAZolin (ANCEF) 3,000 mg in dextrose 5 % 100 mL IVPB  3,000 mg IntraVENous On Call to [address] [place] [address]        lactated ringers infusion   IntraVENous Continuous [facility] [place]           Allergies:  No Known Allergies    Problem List:    Patient Active Problem List   Diagnosis Code    Major depressive disorder, recurrent episode, moderate ([facility] F33.1    Cellulitis L03.90    Type 2 diabetes mellitus with ketoacidosis without coma, without long-term current use of insulin ([facility]) E11.10    Chest pain R07.9    Chronic pain of right ankle M25.571, G89.29    Chronic pain in right foot M79.671, G89.29    Neuralgia and neuritis M79.2    Complex regional pain syndrome type 2 of right lower extremity G57.71       Past Medical History:        Diagnosis Date    Cancer ([facility] 2012    basal cell scalp    Diabetes mellitus ([facility]     Hyperlipidemia     Hypertension     Nerve pain     right foot    Obese     Seasonal allergies        Past Surgical History:        Procedure Laterality Date    ANESTHESIA NERVE BLOCK Right 6/18/2020    RIGHT LUMBAR SYMPATHETIC BLOCK UNDER FLUORO performed by Roberth Hendrickson MD at [facility]  years ago    removal cysts    NEUROMA SURGERY Right     done x 3 / last one 2/2019    PAIN MANAGEMENT PROCEDURE N/A 9/2/2021    SPINAL CORD STIMULATOR TRIAL WITH NEVRO  (CPT 77122) performed by Roberth Hendrickson MD at [facility] Right 12/2/2021    LUMBAR TRANSFORAMINAL EPIDURAL STEROID INJECTION RIGHT L5 AND S1 UNDER FLUOROSCOPIC GUIDANCE performed by Roberth Hendrickson MD at [address] [place] [address] Left     rotator cuff done x 3 / last one 2012    SKIN CANCER EXCISION  2012    basal cell ca scalp excision       Social History:    Social History     Tobacco Use    Smoking status: Former Smoker     Packs/day: 1.00     Years: 12.00     Pack years: 12.00     Types: Cigarettes     Quit date: 6/15/2005     Years since quittin.9    Smokeless tobacco: Never Used   Substance Use Topics    Alcohol use: Yes     Comment: rarely                                Counseling given: Not Answered      Vital Signs (Current):   Vitals:    22 1136 22 0645   BP:  127/64   Pulse:  84   Resp:  18   Temp:  97.9 °F (36.6 °C)   TempSrc:  Temporal   SpO2:  96%   Weight: 272 lb (123.4 kg)    Height: 5' 5\" (1.651 m)                                               BP Readings from Last 3 Encounters:   22 127/64   22 120/72   22 (!) 170/88       NPO Status: Time of last liquid consumption:                         Time of last solid consumption:                         Date of last liquid consumption: 22                        Date of last solid food consumption: 22    BMI:   Wt Readings from Last 3 Encounters:   22 272 lb (123.4 kg)   22 280 lb (127 kg)   22 280 lb (127 kg)     Body mass index is 45.26 kg/m². CBC:   Lab Results   Component Value Date    WBC 9.9 2021    RBC 4.65 2021    HGB 12.8 2021    HCT 38.3 2021    MCV 82.4 2021    RDW 13.7 2021     2021       CMP:   Lab Results   Component Value Date     2021    K 4.2 2021     2021    CO2 19 2021    BUN 20 2021    CREATININE 0.8 2021    GFRAA >60 2021    LABGLOM >60 2021    GLUCOSE 279 2021    PROT 7.3 2021    CALCIUM 9.0 2021    BILITOT <0.2 2021    ALKPHOS 103 2021    AST 23 2021    ALT 31 2021       POC Tests: No results for input(s): POCGLU, POCNA, POCK, POCCL, POCBUN, POCHEMO, POCHCT in the last 72 hours.     Coags: No results found for: PROTIME, INR, APTT    HCG (If Applicable):   Lab Results Component Value Date    PREGTESTUR NEGATIVE 06/18/2020        ABGs: No results found for: PHART, PO2ART, NNR3CRE, WZL8JOV, BEART, M8GDOQLW     Type & Screen (If Applicable):  No results found for: LABABO, LABRH    Drug/Infectious Status (If Applicable):  No results found for: HIV, HEPCAB    COVID-19 Screening (If Applicable):   Lab Results   Component Value Date    COVID19 Not Detected 02/09/2021         Anesthesia Evaluation  Patient summary reviewed   history of anesthetic complications: PONV. Airway: Mallampati: II  TM distance: >3 FB   Neck ROM: full     Dental:          Pulmonary: breath sounds clear to auscultation      (-) not a current smoker                          ROS comment: Seasonal allergies  Former Smoker    Cardiovascular:    (+) hypertension:, hyperlipidemia        Rhythm: regular  Rate: normal           Beta Blocker:  Not on Beta Blocker         Neuro/Psych:   (+) neuromuscular disease (COMPLEX REGIONAL PAIN SYNDROME TYPE 2 RIGHT LOWER EXTREMITY):, psychiatric history: stable with treatmentdepression/anxiety             GI/Hepatic/Renal:   (+) morbid obesity          Endo/Other:    (+) DiabetesType II DM, using insulin, . Abdominal:   (+) obese,           Vascular: negative vascular ROS. Other Findings:             Anesthesia Plan      MAC     ASA 3       Induction: intravenous. MIPS: Prophylactic antiemetics administered. Anesthetic plan and risks discussed with patient. Plan discussed with CRNA. Chart reviewed . Patient assessed before induction. I agree with the above note.   Jasmyne Mooney 54, DO  Staff Anesthesiologist  8:01 AM

## 2022-06-02 NOTE — ANESTHESIA POSTPROCEDURE EVALUATION
Department of Anesthesiology  Postprocedure Note    Patient: Sofie Simpson  MRN: [id number]  YOB: 1970  Date of evaluation: 6/2/2022  Time:  10:34 AM     Procedure Summary     Date: 06/02/22 Room / Location: [facility] PROCEDURE ROOM 02 / [facility]    Anesthesia Start: 1981 Anesthesia Stop: 1034    Procedure: SPINAL CORD STIMULATOR TRIAL WITH [employer] (N/A ) Diagnosis:       Complex regional pain syndrome affecting both upper arms      (COMPLEX REGIONAL PAIN SYNDROME)    Surgeons: Gailen Cockayne, MD Responsible Provider: Itz Mack DO    Anesthesia Type: MAC ASA Status: 3          Anesthesia Type: MAC    Raffi Phase I: Raffi Score: 10    Raffi Phase II:      Last vitals: Reviewed and per EMR flowsheets.        Anesthesia Post Evaluation    Patient location during evaluation: PACU  Patient participation: complete - patient participated  Level of consciousness: awake  Airway patency: patent  Nausea & Vomiting: no nausea and no vomiting  Complications: no  Cardiovascular status: hemodynamically stable  Respiratory status: acceptable  Hydration status: euvolemic

## 2022-06-06 ENCOUNTER — OFFICE VISIT (OUTPATIENT)
Dept: PAIN MANAGEMENT | Age: 52
End: 2022-06-06
Payer: MEDICARE

## 2022-06-06 VITALS
SYSTOLIC BLOOD PRESSURE: 136 MMHG | HEART RATE: 95 BPM | HEIGHT: 65 IN | BODY MASS INDEX: 44.98 KG/M2 | OXYGEN SATURATION: 93 % | RESPIRATION RATE: 16 BRPM | TEMPERATURE: 98.7 F | DIASTOLIC BLOOD PRESSURE: 80 MMHG | WEIGHT: 270 LBS

## 2022-06-06 DIAGNOSIS — G89.29 CHRONIC PAIN IN RIGHT FOOT: ICD-10-CM

## 2022-06-06 DIAGNOSIS — G89.29 CHRONIC PAIN OF RIGHT ANKLE: ICD-10-CM

## 2022-06-06 DIAGNOSIS — M25.571 CHRONIC PAIN OF RIGHT ANKLE: ICD-10-CM

## 2022-06-06 DIAGNOSIS — M79.2 NEURALGIA AND NEURITIS: Primary | ICD-10-CM

## 2022-06-06 DIAGNOSIS — M79.671 CHRONIC PAIN IN RIGHT FOOT: ICD-10-CM

## 2022-06-06 PROCEDURE — 99203 OFFICE O/P NEW LOW 30 MIN: CPT

## 2022-06-06 PROCEDURE — 99203 OFFICE O/P NEW LOW 30 MIN: CPT | Performed by: ANESTHESIOLOGY

## 2022-06-06 PROCEDURE — 99024 POSTOP FOLLOW-UP VISIT: CPT | Performed by: ANESTHESIOLOGY

## 2022-06-06 NOTE — PROGRESS NOTES
Do you currently have any of the following:    Fever: No  Headache:  No  Cough: No  Shortness of breath: No  Exposed to anyone with these symptoms: No         Ghassan Servant presents to the [address] [place] [address] on 6/6/2022. Franklin Topete is complaining of pain right leg pain. The pain is constant. The pain is described as sharp and nagging. Pain is rated on her best day at a 5, on her worst day at a 10, and on average at a 7 on the VAS scale. She took her last dose of Percocet yesterday morning. Any procedures since your last visit: No, with  % relief. Pacemaker or defibrillator: No managed by . She is not on NSAIDS and is not on anticoagulation medications to include none and is managed by . Medication Contract and Consent for Opioid Use Documents Filed      No documents found                /80   Pulse 95   Temp 98.7 °F (37.1 °C) (Infrared)   Resp 16   Ht 5' 5\" (1.651 m)   Wt 270 lb (122.5 kg)   LMP  (LMP Unknown)   SpO2 93%   BMI 44.93 kg/m²      No LMP recorded (lmp unknown).  Patient is postmenopausal.

## 2022-06-06 NOTE — PROGRESS NOTES
SCS trial leads placed on 6/2/2022. Here for SCS programming- done by BS rep. Dressing was loose- dressing changed. For lead removal tomorrow at [facility]. Nursing notes and details of the pain history reviewed. Please see intake notes for details.     Cruz Robles MD

## 2022-06-07 ENCOUNTER — PROCEDURE VISIT (OUTPATIENT)
Dept: PAIN MANAGEMENT | Age: 52
End: 2022-06-07
Payer: MEDICARE

## 2022-06-07 VITALS
HEART RATE: 109 BPM | RESPIRATION RATE: 16 BRPM | BODY MASS INDEX: 45.32 KG/M2 | SYSTOLIC BLOOD PRESSURE: 152 MMHG | TEMPERATURE: 97.5 F | DIASTOLIC BLOOD PRESSURE: 78 MMHG | HEIGHT: 65 IN | WEIGHT: 272 LBS | OXYGEN SATURATION: 97 %

## 2022-06-07 DIAGNOSIS — M25.571 CHRONIC PAIN OF RIGHT ANKLE: ICD-10-CM

## 2022-06-07 DIAGNOSIS — M79.2 NEURALGIA AND NEURITIS: Primary | ICD-10-CM

## 2022-06-07 DIAGNOSIS — G89.29 CHRONIC PAIN OF RIGHT ANKLE: ICD-10-CM

## 2022-06-07 DIAGNOSIS — G89.29 CHRONIC PAIN IN RIGHT FOOT: ICD-10-CM

## 2022-06-07 DIAGNOSIS — M79.671 CHRONIC PAIN IN RIGHT FOOT: ICD-10-CM

## 2022-06-07 PROCEDURE — 99213 OFFICE O/P EST LOW 20 MIN: CPT | Performed by: ANESTHESIOLOGY

## 2022-06-07 PROCEDURE — 99024 POSTOP FOLLOW-UP VISIT: CPT | Performed by: ANESTHESIOLOGY

## 2022-06-07 PROCEDURE — 99213 OFFICE O/P EST LOW 20 MIN: CPT

## 2022-06-07 RX ORDER — HYDROXYZINE 50 MG/1
50 TABLET, FILM COATED ORAL EVERY 8 HOURS PRN
Qty: 30 TABLET | Refills: 0 | Status: SHIPPED | OUTPATIENT
Start: 2022-06-07 | End: 2022-06-17

## 2022-06-07 NOTE — PROGRESS NOTES
Do you currently have any of the following:    Fever: No  Headache:  No  Cough: No  Shortness of breath: No  Exposed to anyone with these symptoms: No                                                                                                                Milagro Breen presents to the [facility] on 6/7/2022. Gopal Purdy is complaining of pain in her back. The pain is constant. The pain is described as burning and itching. Pain is rated on her best day at a 5, on her worst day at a 9, and on average at a 7 on the VAS scale. She took her last dose of Motrin today. Gopal Purdy does not have issues with constipation. Any procedures since your last visit: Yes, spinal cord stimulator trial    She is not on NSAIDS and  is not on anticoagulation medications to include none. Pacemaker or defibrillator: No.    Medication Contract and Consent for Opioid Use Documents Filed      No documents found                   BP (!) 152/78   Pulse (!) 109   Temp 97.5 °F (36.4 °C) (Infrared)   Resp 16   Ht 5' 5\" (1.651 m)   Wt 272 lb (123.4 kg)   LMP  (LMP Unknown)   SpO2 97%   BMI 45.26 kg/m²      No LMP recorded (lmp unknown).  Patient is postmenopausal.

## 2022-06-07 NOTE — PROGRESS NOTES
[facility]  [address] [facility], [address] [place] [address] Jesus  [phone]    Follow up Note      Brandon Duran     Date of Visit:  6/7/2022    CC:  Patient presents for follow up   Chief Complaint   Patient presents with    Follow-up    Back Pain       HPI:    H/o LE pain. SCS trial leads placed on 6/2/2022. Noticed mild relief from the SCS trial.    Skin itching at the SCS trial lead site- wants to remove the leads. For lead removal.    Nursing notes and details of the pain history reviewed. Please see intake notes for details. Past Medical History:   Diagnosis Date    Cancer [facility]) 2012    basal cell scalp    Diabetes mellitus ([facility] [address]     Hyperlipidemia     Hypertension     Nerve pain     right foot    Obese     Seasonal allergies        Past Surgical History:   Procedure Laterality Date    ANESTHESIA NERVE BLOCK Right 6/18/2020    RIGHT LUMBAR SYMPATHETIC BLOCK UNDER FLUORO performed by Julio Cesar Soler MD at [facility]  years ago    removal cysts    NEUROMA SURGERY Right     done x 3 / last one 2/2019    PAIN MANAGEMENT PROCEDURE N/A 9/2/2021    SPINAL CORD STIMULATOR TRIAL WITH NEVRO  (CPT 63838) performed by Julio Cesar Soler MD at [address] [place] [address] Right 12/2/2021    LUMBAR TRANSFORAMINAL EPIDURAL STEROID INJECTION RIGHT L5 AND S1 UNDER FLUOROSCOPIC GUIDANCE performed by Julio Cesar Soler MD at [address] [place] [address] N/A 6/2/2022    SPINAL CORD STIMULATOR TRIAL WITH [employer] performed by Julio Cesar Soler MD at [address] [place] [address] Left     rotator cuff done x 3 / last one 2012    SKIN CANCER EXCISION  2012    basal cell ca scalp excision       Prior to Admission medications    Medication Sig Start Date End Date Taking?  Authorizing Provider   amLODIPine (NORVASC) 10 MG tablet Take 10 mg by mouth at bedtime  3/11/22  Yes Historical Provider, MD   losartan-hydroCHLOROthiazide (HYZAAR) 100-25 MG per tablet TAKE 1 TABLET BY MOUTH EVERY DAY 22  Yes Historical Provider, MD STODDARD UF III MINI PEN NEEDLES 31G X 5 MM MISC USE 4 TIMES A DAY 22  Yes Historical Provider, MD   DULoxetine (CYMBALTA) 60 MG extended release capsule Take 1 capsule by mouth nightly 22  Yes Nuha Sellers MD   vitamin D (ERGOCALCIFEROL) 1.25 MG (59977 UT) CAPS capsule Take 50,000 Units by mouth once a week   Yes Historical Provider, MD   Semaglutide,0.25 or 0.5MG/DOS, (OZEMPIC, 0.25 OR 0.5 MG/DOSE,) 2 MG/1.5ML SOPN Inject 0.5 mg into the skin once a week Takes every Monday   Yes Historical Provider, MD   atorvastatin (LIPITOR) 40 MG tablet Take 60 mg by mouth nightly  20  Yes Historical Provider, MD   insulin glargine (LANTUS SOLOSTAR) 100 UNIT/ML injection pen Inject 25 Units into the skin nightly  Patient taking differently: Inject 70 Units into the skin nightly  3/22/19  Yes Jim Dong MD   insulin lispro (HUMALOG KWIKPEN) 100 UNIT/ML pen Inject 4 Units into the skin 3 times daily (before meals)  Patient taking differently: Inject 8 Units into the skin 3 times daily (before meals)  3/22/19  Yes Jim Dong MD   fluticasone (FLONASE) 50 MCG/ACT nasal spray 1 spray by Each Nare route daily 3/23/19  Yes Jim Dong MD   Lancets MISC 1 each by Does not apply route daily 3/20/19  Yes Jim Dong MD   Insulin Pen Needle 31G X 6 MM MISC 1 each by Does not apply route daily 3/20/19  Yes Jim Dong MD       No Known Allergies    Social History     Socioeconomic History    Marital status:      Spouse name: Not on file    Number of children: Not on file    Years of education: Not on file    Highest education level: Not on file   Occupational History    Not on file   Tobacco Use    Smoking status: Former Smoker     Packs/day: 1.00     Years: 12.00     Pack years: 12.00     Types: Cigarettes     Quit date: 6/15/2005     Years since quittin.0    Smokeless tobacco: Never Used Vaping Use    Vaping Use: Never used   Substance and Sexual Activity    Alcohol use: Yes     Comment: rarely    Drug use: Never    Sexual activity: Not on file   Other Topics Concern    Not on file   Social History Narrative    Not on file     Social Determinants of Health     Financial Resource Strain:     Difficulty of Paying Living Expenses: Not on file   Food Insecurity:     Worried About Running Out of Food in the Last Year: Not on file    Willy of Food in the Last Year: Not on file   Transportation Needs:     Lack of Transportation (Medical): Not on file    Lack of Transportation (Non-Medical): Not on file   Physical Activity:     Days of Exercise per Week: Not on file    Minutes of Exercise per Session: Not on file   Stress:     Feeling of Stress : Not on file   Social Connections:     Frequency of Communication with Friends and Family: Not on file    Frequency of Social Gatherings with Friends and Family: Not on file    Attends [employer] Services: Not on file    Active Member of [address] [place] [address] or Organizations: Not on file    Attends Club or Organization Meetings: Not on file    Marital Status: Not on file   Intimate Partner Violence:     Fear of Current or Ex-Partner: Not on file    Emotionally Abused: Not on file    Physically Abused: Not on file    Sexually Abused: Not on file   Housing Stability:     Unable to Pay for Housing in the Last Year: Not on file    Number of [place] in the Last Year: Not on file    Unstable Housing in the Last Year: Not on file       Family History   Problem Relation Age of Onset    Heart Attack Mother     Heart Attack Father 48    Heart Attack Maternal Grandmother     Heart Attack Other     Mult Sclerosis Sister        REVIEW OF SYSTEMS:     Clearance Orf denies fever/chills, chest pain, shortness of breath, new bowel or bladder complaints. All other review of systems was negative.     PHYSICAL EXAMINATION:      BP (!) 152/78   Pulse (!) 109   Temp 97.5

## 2022-07-31 ENCOUNTER — HOSPITAL ENCOUNTER (INPATIENT)
Age: 52
LOS: 4 days | Discharge: HOME | DRG: 872 | End: 2022-08-04
Attending: EMERGENCY MEDICINE | Admitting: INTERNAL MEDICINE
Payer: MEDICARE

## 2022-07-31 ENCOUNTER — APPOINTMENT (OUTPATIENT)
Dept: CT IMAGING | Age: 52
DRG: 872 | End: 2022-07-31
Payer: MEDICARE

## 2022-07-31 DIAGNOSIS — N39.0 URINARY TRACT INFECTION WITHOUT HEMATURIA, SITE UNSPECIFIED: ICD-10-CM

## 2022-07-31 DIAGNOSIS — R65.20 SEPSIS WITH ACUTE RENAL FAILURE, DUE TO UNSPECIFIED ORGANISM, UNSPECIFIED ACUTE RENAL FAILURE TYPE, UNSPECIFIED WHETHER SEPTIC SHOCK PRESENT (HCC): Primary | ICD-10-CM

## 2022-07-31 DIAGNOSIS — A41.9 SEPSIS WITH ACUTE RENAL FAILURE, DUE TO UNSPECIFIED ORGANISM, UNSPECIFIED ACUTE RENAL FAILURE TYPE, UNSPECIFIED WHETHER SEPTIC SHOCK PRESENT (HCC): Primary | ICD-10-CM

## 2022-07-31 DIAGNOSIS — N17.9 SEPSIS WITH ACUTE RENAL FAILURE, DUE TO UNSPECIFIED ORGANISM, UNSPECIFIED ACUTE RENAL FAILURE TYPE, UNSPECIFIED WHETHER SEPTIC SHOCK PRESENT (HCC): Primary | ICD-10-CM

## 2022-07-31 LAB
ADENOVIRUS BY PCR: NOT DETECTED
ALBUMIN SERPL-MCNC: 3.6 G/DL (ref 3.5–5.2)
ALP BLD-CCNC: 135 U/L (ref 35–104)
ALT SERPL-CCNC: 24 U/L (ref 0–32)
ANION GAP SERPL CALCULATED.3IONS-SCNC: 17 MMOL/L (ref 7–16)
AST SERPL-CCNC: 22 U/L (ref 0–31)
BACTERIA: ABNORMAL /HPF
BASOPHILS ABSOLUTE: 0 E9/L (ref 0–0.2)
BASOPHILS RELATIVE PERCENT: 0.4 % (ref 0–2)
BILIRUB SERPL-MCNC: 0.9 MG/DL (ref 0–1.2)
BILIRUBIN URINE: NEGATIVE
BLOOD, URINE: ABNORMAL
BORDETELLA PARAPERTUSSIS BY PCR: NOT DETECTED
BORDETELLA PERTUSSIS BY PCR: NOT DETECTED
BUN BLDV-MCNC: 18 MG/DL (ref 6–20)
CALCIUM SERPL-MCNC: 8.3 MG/DL (ref 8.6–10.2)
CHLAMYDOPHILIA PNEUMONIAE BY PCR: NOT DETECTED
CHLORIDE BLD-SCNC: 93 MMOL/L (ref 98–107)
CLARITY: ABNORMAL
CO2: 20 MMOL/L (ref 22–29)
COLOR: YELLOW
CORONAVIRUS 229E BY PCR: NOT DETECTED
CORONAVIRUS HKU1 BY PCR: NOT DETECTED
CORONAVIRUS NL63 BY PCR: NOT DETECTED
CORONAVIRUS OC43 BY PCR: NOT DETECTED
CREAT SERPL-MCNC: 1.6 MG/DL (ref 0.5–1)
EOSINOPHILS ABSOLUTE: 0.21 E9/L (ref 0.05–0.5)
EOSINOPHILS RELATIVE PERCENT: 0.9 % (ref 0–6)
EPITHELIAL CELLS, UA: ABNORMAL /HPF
GFR AFRICAN AMERICAN: 41
GFR NON-AFRICAN AMERICAN: 34 ML/MIN/1.73
GLUCOSE BLD-MCNC: 225 MG/DL (ref 74–99)
GLUCOSE URINE: 100 MG/DL
HCT VFR BLD CALC: 34.2 % (ref 34–48)
HEMOGLOBIN: 11.6 G/DL (ref 11.5–15.5)
HUMAN METAPNEUMOVIRUS BY PCR: NOT DETECTED
HUMAN RHINOVIRUS/ENTEROVIRUS BY PCR: NOT DETECTED
INFLUENZA A BY PCR: NOT DETECTED
INFLUENZA B BY PCR: NOT DETECTED
KETONES, URINE: NEGATIVE MG/DL
LACTIC ACID: 1.5 MMOL/L (ref 0.5–2.2)
LEUKOCYTE ESTERASE, URINE: ABNORMAL
LIPASE: 18 U/L (ref 13–60)
LYMPHOCYTES ABSOLUTE: 0.23 E9/L (ref 1.5–4)
LYMPHOCYTES RELATIVE PERCENT: 0.9 % (ref 20–42)
MCH RBC QN AUTO: 27.7 PG (ref 26–35)
MCHC RBC AUTO-ENTMCNC: 33.9 % (ref 32–34.5)
MCV RBC AUTO: 81.6 FL (ref 80–99.9)
MONOCYTES ABSOLUTE: 0.92 E9/L (ref 0.1–0.95)
MONOCYTES RELATIVE PERCENT: 4.3 % (ref 2–12)
MYCOPLASMA PNEUMONIAE BY PCR: NOT DETECTED
NEUTROPHILS ABSOLUTE: 21.53 E9/L (ref 1.8–7.3)
NEUTROPHILS RELATIVE PERCENT: 93.9 % (ref 43–80)
NITRITE, URINE: POSITIVE
PARAINFLUENZA VIRUS 1 BY PCR: NOT DETECTED
PARAINFLUENZA VIRUS 2 BY PCR: NOT DETECTED
PARAINFLUENZA VIRUS 3 BY PCR: NOT DETECTED
PARAINFLUENZA VIRUS 4 BY PCR: NOT DETECTED
PDW BLD-RTO: 13.1 FL (ref 11.5–15)
PH UA: 6 (ref 5–9)
PLATELET # BLD: 223 E9/L (ref 130–450)
PMV BLD AUTO: 9.8 FL (ref 7–12)
POTASSIUM SERPL-SCNC: 3.2 MMOL/L (ref 3.5–5)
PROCALCITONIN: 1.97 NG/ML (ref 0–0.08)
PROTEIN UA: >=300 MG/DL
RBC # BLD: 4.19 E12/L (ref 3.5–5.5)
RBC UA: ABNORMAL /HPF (ref 0–2)
REASON FOR REJECTION: NORMAL
REASON FOR REJECTION: NORMAL
REJECTED TEST: NORMAL
REJECTED TEST: NORMAL
RESPIRATORY SYNCYTIAL VIRUS BY PCR: NOT DETECTED
SARS-COV-2, PCR: NOT DETECTED
SODIUM BLD-SCNC: 130 MMOL/L (ref 132–146)
SPECIFIC GRAVITY UA: 1.02 (ref 1–1.03)
TOTAL PROTEIN: 7.6 G/DL (ref 6.4–8.3)
UROBILINOGEN, URINE: 1 E.U./DL
WBC # BLD: 22.9 E9/L (ref 4.5–11.5)
WBC UA: ABNORMAL /HPF (ref 0–5)

## 2022-07-31 PROCEDURE — 6360000002 HC RX W HCPCS: Performed by: EMERGENCY MEDICINE

## 2022-07-31 PROCEDURE — 87150 DNA/RNA AMPLIFIED PROBE: CPT

## 2022-07-31 PROCEDURE — 87186 SC STD MICRODIL/AGAR DIL: CPT

## 2022-07-31 PROCEDURE — 96374 THER/PROPH/DIAG INJ IV PUSH: CPT

## 2022-07-31 PROCEDURE — 6370000000 HC RX 637 (ALT 250 FOR IP): Performed by: NURSE PRACTITIONER

## 2022-07-31 PROCEDURE — 87077 CULTURE AEROBIC IDENTIFY: CPT

## 2022-07-31 PROCEDURE — 81001 URINALYSIS AUTO W/SCOPE: CPT

## 2022-07-31 PROCEDURE — 99285 EMERGENCY DEPT VISIT HI MDM: CPT

## 2022-07-31 PROCEDURE — 96361 HYDRATE IV INFUSION ADD-ON: CPT

## 2022-07-31 PROCEDURE — 80053 COMPREHEN METABOLIC PANEL: CPT

## 2022-07-31 PROCEDURE — 87040 BLOOD CULTURE FOR BACTERIA: CPT

## 2022-07-31 PROCEDURE — 2580000003 HC RX 258: Performed by: EMERGENCY MEDICINE

## 2022-07-31 PROCEDURE — 85025 COMPLETE CBC W/AUTO DIFF WBC: CPT

## 2022-07-31 PROCEDURE — 87088 URINE BACTERIA CULTURE: CPT

## 2022-07-31 PROCEDURE — 84145 PROCALCITONIN (PCT): CPT

## 2022-07-31 PROCEDURE — 83690 ASSAY OF LIPASE: CPT

## 2022-07-31 PROCEDURE — 83605 ASSAY OF LACTIC ACID: CPT

## 2022-07-31 PROCEDURE — 96375 TX/PRO/DX INJ NEW DRUG ADDON: CPT

## 2022-07-31 PROCEDURE — 6370000000 HC RX 637 (ALT 250 FOR IP): Performed by: EMERGENCY MEDICINE

## 2022-07-31 PROCEDURE — 36415 COLL VENOUS BLD VENIPUNCTURE: CPT

## 2022-07-31 PROCEDURE — 93005 ELECTROCARDIOGRAM TRACING: CPT | Performed by: PHYSICIAN ASSISTANT

## 2022-07-31 PROCEDURE — 6360000002 HC RX W HCPCS: Performed by: PHYSICIAN ASSISTANT

## 2022-07-31 PROCEDURE — 74176 CT ABD & PELVIS W/O CONTRAST: CPT

## 2022-07-31 PROCEDURE — 2140000000 HC CCU INTERMEDIATE R&B

## 2022-07-31 PROCEDURE — 0202U NFCT DS 22 TRGT SARS-COV-2: CPT

## 2022-07-31 RX ORDER — 0.9 % SODIUM CHLORIDE 0.9 %
1000 INTRAVENOUS SOLUTION INTRAVENOUS ONCE
Status: DISCONTINUED | OUTPATIENT
Start: 2022-07-31 | End: 2022-07-31

## 2022-07-31 RX ORDER — ACETAMINOPHEN 650 MG/1
650 SUPPOSITORY RECTAL EVERY 6 HOURS PRN
Status: DISCONTINUED | OUTPATIENT
Start: 2022-07-31 | End: 2022-08-04 | Stop reason: HOSPADM

## 2022-07-31 RX ORDER — ONDANSETRON 2 MG/ML
4 INJECTION INTRAMUSCULAR; INTRAVENOUS ONCE
Status: COMPLETED | OUTPATIENT
Start: 2022-07-31 | End: 2022-07-31

## 2022-07-31 RX ORDER — SODIUM CHLORIDE 9 MG/ML
INJECTION, SOLUTION INTRAVENOUS CONTINUOUS
Status: DISCONTINUED | OUTPATIENT
Start: 2022-07-31 | End: 2022-08-01 | Stop reason: SDUPTHER

## 2022-07-31 RX ORDER — ACETAMINOPHEN 500 MG
1000 TABLET ORAL ONCE
Status: COMPLETED | OUTPATIENT
Start: 2022-07-31 | End: 2022-07-31

## 2022-07-31 RX ORDER — ONDANSETRON 4 MG/1
4 TABLET, ORALLY DISINTEGRATING ORAL EVERY 8 HOURS PRN
Status: DISCONTINUED | OUTPATIENT
Start: 2022-07-31 | End: 2022-08-04 | Stop reason: HOSPADM

## 2022-07-31 RX ORDER — 0.9 % SODIUM CHLORIDE 0.9 %
30 INTRAVENOUS SOLUTION INTRAVENOUS ONCE
Status: COMPLETED | OUTPATIENT
Start: 2022-07-31 | End: 2022-07-31

## 2022-07-31 RX ORDER — KETOROLAC TROMETHAMINE 30 MG/ML
15 INJECTION, SOLUTION INTRAMUSCULAR; INTRAVENOUS ONCE
Status: COMPLETED | OUTPATIENT
Start: 2022-07-31 | End: 2022-07-31

## 2022-07-31 RX ORDER — ONDANSETRON 2 MG/ML
4 INJECTION INTRAMUSCULAR; INTRAVENOUS EVERY 6 HOURS PRN
Status: DISCONTINUED | OUTPATIENT
Start: 2022-07-31 | End: 2022-08-04 | Stop reason: HOSPADM

## 2022-07-31 RX ORDER — ACETAMINOPHEN 325 MG/1
650 TABLET ORAL EVERY 6 HOURS PRN
Status: DISCONTINUED | OUTPATIENT
Start: 2022-07-31 | End: 2022-08-04 | Stop reason: HOSPADM

## 2022-07-31 RX ADMIN — WATER 2000 MG: 1 INJECTION INTRAMUSCULAR; INTRAVENOUS; SUBCUTANEOUS at 16:10

## 2022-07-31 RX ADMIN — ACETAMINOPHEN 1000 MG: 500 TABLET ORAL at 15:15

## 2022-07-31 RX ADMIN — ONDANSETRON HYDROCHLORIDE 4 MG: 2 SOLUTION INTRAMUSCULAR; INTRAVENOUS at 14:50

## 2022-07-31 RX ADMIN — ONDANSETRON 4 MG: 4 TABLET, ORALLY DISINTEGRATING ORAL at 23:22

## 2022-07-31 RX ADMIN — KETOROLAC TROMETHAMINE 15 MG: 30 INJECTION, SOLUTION INTRAMUSCULAR at 15:21

## 2022-07-31 RX ADMIN — ACETAMINOPHEN 325MG 650 MG: 325 TABLET ORAL at 23:21

## 2022-07-31 RX ADMIN — SODIUM CHLORIDE 3714 ML: 9 INJECTION, SOLUTION INTRAVENOUS at 14:47

## 2022-07-31 RX ADMIN — ONDANSETRON HYDROCHLORIDE 4 MG: 2 SOLUTION INTRAMUSCULAR; INTRAVENOUS at 17:35

## 2022-07-31 RX ADMIN — SODIUM CHLORIDE: 9 INJECTION, SOLUTION INTRAVENOUS at 18:54

## 2022-07-31 ASSESSMENT — PAIN DESCRIPTION - DESCRIPTORS: DESCRIPTORS: ACHING

## 2022-07-31 ASSESSMENT — PAIN SCALES - GENERAL: PAINLEVEL_OUTOF10: 3

## 2022-07-31 ASSESSMENT — PAIN DESCRIPTION - LOCATION: LOCATION: HEAD

## 2022-07-31 NOTE — ED PROVIDER NOTES
Department of Emergency Medicine   ED  Provider Note  Admit Date/RoomTime: 7/31/2022  1:40 PM  ED Room: [facility]          History of Present Illness:  7/31/22, Time: 1:52 PM EDT  Chief Complaint   Patient presents with    Fever     Fever, chills, vomiting, vaginal pressure for the last few days. Nam Greco is a 45 y.o. female presenting to the ED for fever, N/V/D upper abd pain, beginning a few days. The complaint has been persistent, moderate in severity, and worsened by nothing. Patient presents with fever nausea vomiting diarrhea upper abdominal pain. T-max of 103 last night, last took antipyretics at 4 this morning. Complains of intermittent upper abdominal pain night sweats multiple episodes of nonbloody diarrhea nonbloody vomiting. Took COVID test on Wednesday as well as last night which were both negative. History of diabetes, states blood sugar this morning was 165. Port several days ago she thought she might have a urinary tract infection, started take over-the-counter Azo. States symptoms have improved. Review of Systems:   Pertinent positives and negatives are stated within HPI, all other systems reviewed and are negative.        --------------------------------------------- PAST HISTORY ---------------------------------------------  Past Medical History:  has a past medical history of Cancer ([facility] [address] Diabetes mellitus ([facility] [address] Hyperlipidemia, Hypertension, Nerve pain, Obese, and Seasonal allergies. Past Surgical History:  has a past surgical history that includes shoulder surgery (Left); Neuroma surgery (Right); Breast surgery (years ago); Anesthesia Nerve Block (Right, 6/18/2020); Skin cancer excision (2012); Pain management procedure (N/A, 9/2/2021); Pain management procedure (Right, 12/2/2021); and Pain management procedure (N/A, 6/2/2022). Social History:  reports that she quit smoking about 17 years ago. Her smoking use included cigarettes.  She has a 12.00 pack-year placed or performed during the hospital encounter of 07/31/22   Respiratory Panel, Molecular, with COVID-19 (Restricted: peds pts or suitable admitted adults)    Specimen: Nasopharyngeal   Result Value Ref Range    Adenovirus by PCR Not Detected Not Detected    Bordetella parapertussis by PCR Not Detected Not Detected    Bordetella pertussis by PCR Not Detected Not Detected    Chlamydophilia pneumoniae by PCR Not Detected Not Detected    Coronavirus 229E by PCR Not Detected Not Detected    Coronavirus HKU1 by PCR Not Detected Not Detected    Coronavirus NL63 by PCR Not Detected Not Detected    Coronavirus OC43 by PCR Not Detected Not Detected    SARS-CoV-2, PCR Not Detected Not Detected    Human Metapneumovirus by PCR Not Detected Not Detected    Human Rhinovirus/Enterovirus by PCR Not Detected Not Detected    Influenza A by PCR Not Detected Not Detected    Influenza B by PCR Not Detected Not Detected    Mycoplasma pneumoniae by PCR Not Detected Not Detected    Parainfluenza Virus 1 by PCR Not Detected Not Detected    Parainfluenza Virus 2 by PCR Not Detected Not Detected    Parainfluenza Virus 3 by PCR Not Detected Not Detected    Parainfluenza Virus 4 by PCR Not Detected Not Detected    Respiratory Syncytial Virus by PCR Not Detected Not Detected   Urinalysis   Result Value Ref Range    Color, UA Yellow Straw/Yellow    Clarity, UA CLOUDY (A) Clear    Glucose, Ur 100 (A) Negative mg/dL    Bilirubin Urine Negative Negative    Ketones, Urine Negative Negative mg/dL    Specific Gravity, UA 1.025 1.005 - 1.030    Blood, Urine LARGE (A) Negative    pH, UA 6.0 5.0 - 9.0    Protein, UA >=300 (A) Negative mg/dL    Urobilinogen, Urine 1.0 <2.0 E.U./dL    Nitrite, Urine POSITIVE (A) Negative    Leukocyte Esterase, Urine LARGE (A) Negative   SPECIMEN REJECTION   Result Value Ref Range    Rejected Test CBCWD     Reason for Rejection see below    CBC with Auto Differential   Result Value Ref Range    WBC 22.9 (H) 4.5 - 11.5 E9/L    RBC 4.19 3.50 - 5.50 E12/L    Hemoglobin 11.6 11.5 - 15.5 g/dL    Hematocrit 34.2 34.0 - 48.0 %    MCV 81.6 80.0 - 99.9 fL    MCH 27.7 26.0 - 35.0 pg    MCHC 33.9 32.0 - 34.5 %    RDW 13.1 11.5 - 15.0 fL    Platelets 871 027 - 414 E9/L    MPV 9.8 7.0 - 12.0 fL    Neutrophils % 93.9 (H) 43.0 - 80.0 %    Lymphocytes % 0.9 (L) 20.0 - 42.0 %    Monocytes % 4.3 2.0 - 12.0 %    Eosinophils % 0.9 0.0 - 6.0 %    Basophils % 0.4 0.0 - 2.0 %    Neutrophils Absolute 21.53 (H) 1.80 - 7.30 E9/L    Lymphocytes Absolute 0.23 (L) 1.50 - 4.00 E9/L    Monocytes Absolute 0.92 0.10 - 0.95 E9/L    Eosinophils Absolute 0.21 0.05 - 0.50 E9/L    Basophils Absolute 0.00 0.00 - 0.20 E9/L   SPECIMEN REJECTION   Result Value Ref Range    Rejected Test lacid     Reason for Rejection see below    Comprehensive Metabolic Panel   Result Value Ref Range    Sodium 130 (L) 132 - 146 mmol/L    Potassium 3.2 (L) 3.5 - 5.0 mmol/L    Chloride 93 (L) 98 - 107 mmol/L    CO2 20 (L) 22 - 29 mmol/L    Anion Gap 17 (H) 7 - 16 mmol/L    Glucose 225 (H) 74 - 99 mg/dL    BUN 18 6 - 20 mg/dL    Creatinine 1.6 (H) 0.5 - 1.0 mg/dL    GFR Non-African American 34 >=60 mL/min/1.73    GFR African American 41     Calcium 8.3 (L) 8.6 - 10.2 mg/dL    Total Protein 7.6 6.4 - 8.3 g/dL    Albumin 3.6 3.5 - 5.2 g/dL    Total Bilirubin 0.9 0.0 - 1.2 mg/dL    Alkaline Phosphatase 135 (H) 35 - 104 U/L    ALT 24 0 - 32 U/L    AST 22 0 - 31 U/L   Lactic Acid   Result Value Ref Range    Lactic Acid 1.5 0.5 - 2.2 mmol/L   Lipase   Result Value Ref Range    Lipase 18 13 - 60 U/L   Microscopic Urinalysis   Result Value Ref Range    WBC, UA PACKED (A) 0 - 5 /HPF    RBC, UA 10-20 (A) 0 - 2 /HPF    Epithelial Cells, UA RARE /HPF    Bacteria, UA MANY (A) None Seen /HPF   Procalcitonin   Result Value Ref Range    Procalcitonin 1.97 (H) 0.00 - 0.08 ng/mL   ,       RADIOLOGY:  Interpreted by Radiologist unless otherwise specified  CT ABDOMEN PELVIS WO CONTRAST Additional Contrast? None   Final Result   1. Nonspecific perinephric edema, which given the history (UTI, fever, acute   kidney injury) is likely due to an acute medical renal disease (renal failure   or pyelonephritis). If indicated, contrast enhanced CT would be more   sensitive for pyelonephritis. 2. No urolithiasis or hydronephrosis. 3. Cholelithiasis. 4. Moderate calcified atherosclerosis in the abdominal aorta, the extent of   which is somewhat greater than is typical for age. No aneurysm. ------------------------- NURSING NOTES AND VITALS REVIEWED ---------------------------   The nursing notes within the ED encounter and vital signs as below have been reviewed by myself  BP (!) 94/56   Pulse (!) 111   Temp 98.9 °F (37.2 °C) (Oral)   Resp 14   Ht 5' 5\" (1.651 m)   Wt 273 lb (123.8 kg)   LMP  (LMP Unknown)   SpO2 95%   BMI 45.43 kg/m²     Oxygen Saturation Interpretation: Normal    The cardiac monitor revealed NSR with ST with a heart rate in the 100s as interpreted by me. The cardiac monitor was ordered secondary to the patient's heart rate and to monitor the patient for dysrhythmia. CPT 04068    The patients available past medical records and past encounters were reviewed.         ------------------------------ ED COURSE/MEDICAL DECISION MAKING----------------------  Medications   0.9 % sodium chloride infusion (has no administration in time range)   ondansetron (ZOFRAN) injection 4 mg (4 mg IntraVENous Given 7/31/22 1450)   ketorolac (TORADOL) injection 15 mg (15 mg IntraVENous Given 7/31/22 1521)   acetaminophen (TYLENOL) tablet 1,000 mg (1,000 mg Oral Given 7/31/22 1515)   0.9 % sodium chloride bolus (0 mLs IntraVENous Stopped 7/31/22 1643)   cefTRIAXone (ROCEPHIN) 2,000 mg in sterile water 20 mL IV syringe (2,000 mg IntraVENous Given 7/31/22 1610)   ondansetron (ZOFRAN) injection 4 mg (4 mg IntraVENous Given 7/31/22 1732)                    Medical Decision Making:     Dr. JAE Dhaval zhu the primary provider of record    Presents fever nausea vomiting abdominal pain, work-up undertaken, evidence urinary tract infection high concern for pyelonephritis and sepsis. Cultures obtained broad-spectrum to biotics initiated spoke with medicine patient will be admitted      Re-Evaluations:       Time: 1430  Re-evaluation. Patients symptoms are improving  Repeat physical examination is improved    Sepsis Re-examination  7/31/22   1600PM EDT          Vital Signs:   Vitals:    07/31/22 1310 07/31/22 1333 07/31/22 1609 07/31/22 1746   BP:  122/65 114/79 (!) 94/56   Pulse: (!) 148 (!) 149 (!) 119 (!) 111   Resp:  16 22 14   Temp: (!) 100.5 °F (38.1 °C)  98.9 °F (37.2 °C)    TempSrc:   Oral    SpO2: 98%  95%    Weight:  273 lb (123.8 kg)     Height:  5' 5\" (1.651 m)       Card/Pulm:  Rhythm: rapid rate. Heart Sounds: Normal S1, S2. unlabored breathing. Capillary Refill: normal.  Radial Pulse:  present 2+. Skin:  Warm. This patient's ED course included: a personal history and physicial examination, multiple bedside re-evaluations, IV medications, cardiac monitoring, continuous pulse oximetry, and complex medical decision making and emergency management    This patient has been closely monitored during their ED course. Consultations:  Spoke with Valeriy Walter (Medicine). Discussed case. They will admit this patient. Critical Care: Please note that the withdrawal or failure to initiate urgent interventions for this patient would likely result in a life threatening deterioration or permanent disability. Accordingly this patient received 36 minutes of critical care time, excluding separately billable procedures. Counseling: The emergency provider has spoken with the patient and discussed todays results, in addition to providing specific details for the plan of care and counseling regarding the diagnosis and prognosis.   Questions are answered at this time and they are agreeable with the plan.       --------------------------------- IMPRESSION AND DISPOSITION ---------------------------------    IMPRESSION  1. Sepsis with acute renal failure, due to unspecified organism, unspecified acute renal failure type, unspecified whether [facility]    2. Urinary tract infection without hematuria, site unspecified        DISPOSITION  Disposition: Admit to telemetry  Patient condition is stable        NOTE: This report was transcribed using voice recognition software.  Every effort was made to ensure accuracy; however, inadvertent computerized transcription errors may be present       Diamond Merida DO  07/31/22 3343

## 2022-07-31 NOTE — ED NOTES
Department of Emergency Medicine  FIRST PROVIDER TRIAGE NOTE             Independent MLP           7/31/22  1:33 PM EDT    Date of Encounter: 7/31/22   MRN: [id number]      HPI: Destiny Gandhi is a 45 y.o. female who presents to the ED for Fever (Fever, chills, vomiting, vaginal pressure for the last few days. )   UTI Sx's Fever,   in triage. Also having lower abd pains and emesis    ROS: Negative for sob, cough, or diarrhea. PE: Gen Appearance/Constitutional: alert  HEENT: NC/NT. PERRLA,  Airway patent. CV: tachycardia     Initial Plan of Care: All treatment areas with department are currently occupied. Plan to order/Initiate the following while awaiting opening in ED: labs, EKG, imaging studies, antipyretics, antiemetics, and IV fluids.   Initiate Treatment-Testing, Proceed toTreatment Area When Bed Available for ED Attending/MLP to Continue Care    Electronically signed by DONAVON Grissom   DD: 7/31/22       DONAVON Fu  07/31/22 2244

## 2022-08-01 LAB
ACINETOBACTER CALCOAC BAUMANNII COMPLEX BY PCR: NOT DETECTED
ANION GAP SERPL CALCULATED.3IONS-SCNC: 12 MMOL/L (ref 7–16)
BACTEROIDES FRAGILIS BY PCR: NOT DETECTED
BASOPHILS ABSOLUTE: 0.04 E9/L (ref 0–0.2)
BASOPHILS RELATIVE PERCENT: 0.2 % (ref 0–2)
BOTTLE TYPE: ABNORMAL
BUN BLDV-MCNC: 16 MG/DL (ref 6–20)
CALCIUM SERPL-MCNC: 7.3 MG/DL (ref 8.6–10.2)
CANDIDA ALBICANS BY PCR: NOT DETECTED
CANDIDA AURIS BY PCR: NOT DETECTED
CANDIDA GLABRATA BY PCR: NOT DETECTED
CANDIDA KRUSEI BY PCR: NOT DETECTED
CANDIDA PARAPSILOSIS BY PCR: NOT DETECTED
CANDIDA TROPICALIS BY PCR: NOT DETECTED
CARBAPENEM RESISTANCE IMP GENE BY PCR: NOT DETECTED
CARBAPENEM RESISTANCE KPC BY PCR: NOT DETECTED
CARBAPENEM RESISTANCE NDM GENE BY PCR: NOT DETECTED
CARBAPENEM RESISTANCE OXA-48 GENE BY PCR: NOT DETECTED
CARBAPENEM RESISTANCE VIM GENE BY PCR: NOT DETECTED
CEPHALOSPORIN RESISTANCE CTX-M GENE BY PCR: NOT DETECTED
CHLORIDE BLD-SCNC: 105 MMOL/L (ref 98–107)
CO2: 20 MMOL/L (ref 22–29)
COLISTIN RESISTANCE MCR-1 GENE BY PCR: NOT DETECTED
CREAT SERPL-MCNC: 1.4 MG/DL (ref 0.5–1)
CRYPTOCOCCUS NEOFORMANS/GATTII BY PCR: NOT DETECTED
EKG ATRIAL RATE: 118 BPM
EKG P AXIS: 55 DEGREES
EKG P-R INTERVAL: 164 MS
EKG Q-T INTERVAL: 334 MS
EKG QRS DURATION: 90 MS
EKG QTC CALCULATION (BAZETT): 468 MS
EKG R AXIS: 14 DEGREES
EKG T AXIS: 42 DEGREES
EKG VENTRICULAR RATE: 118 BPM
ENTEROBACTER CLOACAE COMPLEX BY PCR: NOT DETECTED
ENTEROBACTERALES BY PCR: DETECTED
ENTEROCOCCUS FAECALIS BY PCR: NOT DETECTED
ENTEROCOCCUS FAECIUM BY PCR: NOT DETECTED
EOSINOPHILS ABSOLUTE: 0.02 E9/L (ref 0.05–0.5)
EOSINOPHILS RELATIVE PERCENT: 0.1 % (ref 0–6)
ESCHERICHIA COLI BY PCR: DETECTED
GFR AFRICAN AMERICAN: 48
GFR NON-AFRICAN AMERICAN: 39 ML/MIN/1.73
GLUCOSE BLD-MCNC: 177 MG/DL (ref 74–99)
HAEMOPHILUS INFLUENZAE BY PCR: NOT DETECTED
HBA1C MFR BLD: 6.2 % (ref 4–5.6)
HCT VFR BLD CALC: 28 % (ref 34–48)
HEMOGLOBIN: 9.4 G/DL (ref 11.5–15.5)
IMMATURE GRANULOCYTES #: 0.38 E9/L
IMMATURE GRANULOCYTES %: 2 % (ref 0–5)
KLEBSIELLA AEROGENES BY PCR: NOT DETECTED
KLEBSIELLA OXYTOCA BY PCR: NOT DETECTED
KLEBSIELLA PNEUMONIAE GROUP BY PCR: NOT DETECTED
LISTERIA MONOCYTOGENES BY PCR: NOT DETECTED
LYMPHOCYTES ABSOLUTE: 0.61 E9/L (ref 1.5–4)
LYMPHOCYTES RELATIVE PERCENT: 3.2 % (ref 20–42)
MCH RBC QN AUTO: 27.4 PG (ref 26–35)
MCHC RBC AUTO-ENTMCNC: 33.6 % (ref 32–34.5)
MCV RBC AUTO: 81.6 FL (ref 80–99.9)
METER GLUCOSE: 144 MG/DL (ref 74–99)
METER GLUCOSE: 161 MG/DL (ref 74–99)
METER GLUCOSE: 177 MG/DL (ref 74–99)
METER GLUCOSE: 178 MG/DL (ref 74–99)
METER GLUCOSE: 187 MG/DL (ref 74–99)
MONOCYTES ABSOLUTE: 0.97 E9/L (ref 0.1–0.95)
MONOCYTES RELATIVE PERCENT: 5.1 % (ref 2–12)
NEISSERIA MENINGITIDIS BY PCR: NOT DETECTED
NEUTROPHILS ABSOLUTE: 16.96 E9/L (ref 1.8–7.3)
NEUTROPHILS RELATIVE PERCENT: 89.4 % (ref 43–80)
ORDER NUMBER: ABNORMAL
PDW BLD-RTO: 13.4 FL (ref 11.5–15)
PLATELET # BLD: 184 E9/L (ref 130–450)
PMV BLD AUTO: 9.9 FL (ref 7–12)
POTASSIUM REFLEX MAGNESIUM: 3.6 MMOL/L (ref 3.5–5)
PROTEUS SPECIES BY PCR: NOT DETECTED
PSEUDOMONAS AERUGINOSA BY PCR: NOT DETECTED
RBC # BLD: 3.43 E12/L (ref 3.5–5.5)
SALMONELLA SPECIES BY PCR: NOT DETECTED
SERRATIA MARCESCENS BY PCR: NOT DETECTED
SODIUM BLD-SCNC: 137 MMOL/L (ref 132–146)
SOURCE OF BLOOD CULTURE: ABNORMAL
STAPHYLOCOCCUS AUREUS BY PCR: NOT DETECTED
STAPHYLOCOCCUS EPIDERMIDIS BY PCR: NOT DETECTED
STAPHYLOCOCCUS LUGDUNENSIS BY PCR: NOT DETECTED
STAPHYLOCOCCUS SPECIES BY PCR: NOT DETECTED
STENOTROPHOMONAS MALTOPHILIA BY PCR: NOT DETECTED
STREPTOCOCCUS AGALACTIAE BY PCR: NOT DETECTED
STREPTOCOCCUS PNEUMONIAE BY PCR: NOT DETECTED
STREPTOCOCCUS PYOGENES  BY PCR: NOT DETECTED
STREPTOCOCCUS SPECIES BY PCR: NOT DETECTED
WBC # BLD: 19 E9/L (ref 4.5–11.5)

## 2022-08-01 PROCEDURE — S5553 INSULIN LONG ACTING 5 U: HCPCS | Performed by: NURSE PRACTITIONER

## 2022-08-01 PROCEDURE — 6360000002 HC RX W HCPCS: Performed by: NURSE PRACTITIONER

## 2022-08-01 PROCEDURE — 2140000000 HC CCU INTERMEDIATE R&B

## 2022-08-01 PROCEDURE — 2580000003 HC RX 258: Performed by: NURSE PRACTITIONER

## 2022-08-01 PROCEDURE — 6370000000 HC RX 637 (ALT 250 FOR IP): Performed by: NURSE PRACTITIONER

## 2022-08-01 PROCEDURE — 83036 HEMOGLOBIN GLYCOSYLATED A1C: CPT

## 2022-08-01 PROCEDURE — 2580000003 HC RX 258: Performed by: INTERNAL MEDICINE

## 2022-08-01 PROCEDURE — 82962 GLUCOSE BLOOD TEST: CPT

## 2022-08-01 PROCEDURE — 85025 COMPLETE CBC W/AUTO DIFF WBC: CPT

## 2022-08-01 PROCEDURE — 2500000003 HC RX 250 WO HCPCS: Performed by: FAMILY MEDICINE

## 2022-08-01 PROCEDURE — 80048 BASIC METABOLIC PNL TOTAL CA: CPT

## 2022-08-01 RX ORDER — SODIUM CHLORIDE 9 MG/ML
INJECTION, SOLUTION INTRAVENOUS CONTINUOUS
Status: DISCONTINUED | OUTPATIENT
Start: 2022-08-01 | End: 2022-08-03

## 2022-08-01 RX ORDER — SODIUM CHLORIDE 0.9 % (FLUSH) 0.9 %
5-40 SYRINGE (ML) INJECTION PRN
Status: DISCONTINUED | OUTPATIENT
Start: 2022-08-01 | End: 2022-08-04 | Stop reason: HOSPADM

## 2022-08-01 RX ORDER — ATORVASTATIN CALCIUM 40 MG/1
40 TABLET, FILM COATED ORAL NIGHTLY
Status: DISCONTINUED | OUTPATIENT
Start: 2022-08-01 | End: 2022-08-04 | Stop reason: HOSPADM

## 2022-08-01 RX ORDER — 0.9 % SODIUM CHLORIDE 0.9 %
500 INTRAVENOUS SOLUTION INTRAVENOUS ONCE
Status: COMPLETED | OUTPATIENT
Start: 2022-08-01 | End: 2022-08-01

## 2022-08-01 RX ORDER — LOSARTAN POTASSIUM 50 MG/1
100 TABLET ORAL DAILY
Status: DISCONTINUED | OUTPATIENT
Start: 2022-08-01 | End: 2022-08-04 | Stop reason: HOSPADM

## 2022-08-01 RX ORDER — LOSARTAN POTASSIUM AND HYDROCHLOROTHIAZIDE 25; 100 MG/1; MG/1
1 TABLET ORAL DAILY
Status: DISCONTINUED | OUTPATIENT
Start: 2022-08-01 | End: 2022-08-01

## 2022-08-01 RX ORDER — SODIUM CHLORIDE 0.9 % (FLUSH) 0.9 %
5-40 SYRINGE (ML) INJECTION EVERY 12 HOURS SCHEDULED
Status: DISCONTINUED | OUTPATIENT
Start: 2022-08-01 | End: 2022-08-04 | Stop reason: HOSPADM

## 2022-08-01 RX ORDER — HYDROCHLOROTHIAZIDE 25 MG/1
25 TABLET ORAL DAILY
Status: DISCONTINUED | OUTPATIENT
Start: 2022-08-01 | End: 2022-08-04 | Stop reason: HOSPADM

## 2022-08-01 RX ORDER — INSULIN GLARGINE-YFGN 100 [IU]/ML
25 INJECTION, SOLUTION SUBCUTANEOUS NIGHTLY
Status: DISCONTINUED | OUTPATIENT
Start: 2022-08-01 | End: 2022-08-04 | Stop reason: HOSPADM

## 2022-08-01 RX ORDER — INSULIN LISPRO 100 [IU]/ML
0-4 INJECTION, SOLUTION INTRAVENOUS; SUBCUTANEOUS NIGHTLY
Status: DISCONTINUED | OUTPATIENT
Start: 2022-08-01 | End: 2022-08-04 | Stop reason: HOSPADM

## 2022-08-01 RX ORDER — AMLODIPINE BESYLATE 10 MG/1
10 TABLET ORAL NIGHTLY
Status: DISCONTINUED | OUTPATIENT
Start: 2022-08-01 | End: 2022-08-04 | Stop reason: HOSPADM

## 2022-08-01 RX ORDER — DEXTROSE MONOHYDRATE 100 MG/ML
INJECTION, SOLUTION INTRAVENOUS CONTINUOUS PRN
Status: DISCONTINUED | OUTPATIENT
Start: 2022-08-01 | End: 2022-08-04 | Stop reason: HOSPADM

## 2022-08-01 RX ORDER — INSULIN LISPRO 100 [IU]/ML
0-16 INJECTION, SOLUTION INTRAVENOUS; SUBCUTANEOUS
Status: DISCONTINUED | OUTPATIENT
Start: 2022-08-01 | End: 2022-08-04 | Stop reason: HOSPADM

## 2022-08-01 RX ORDER — SODIUM CHLORIDE 9 MG/ML
INJECTION, SOLUTION INTRAVENOUS PRN
Status: DISCONTINUED | OUTPATIENT
Start: 2022-08-01 | End: 2022-08-04 | Stop reason: HOSPADM

## 2022-08-01 RX ORDER — ENOXAPARIN SODIUM 100 MG/ML
30 INJECTION SUBCUTANEOUS 2 TIMES DAILY
Status: DISCONTINUED | OUTPATIENT
Start: 2022-08-01 | End: 2022-08-04 | Stop reason: HOSPADM

## 2022-08-01 RX ORDER — DULOXETIN HYDROCHLORIDE 60 MG/1
60 CAPSULE, DELAYED RELEASE ORAL NIGHTLY
Status: DISCONTINUED | OUTPATIENT
Start: 2022-08-01 | End: 2022-08-04 | Stop reason: HOSPADM

## 2022-08-01 RX ADMIN — ONDANSETRON 4 MG: 2 INJECTION INTRAMUSCULAR; INTRAVENOUS at 14:54

## 2022-08-01 RX ADMIN — INSULIN GLARGINE-YFGN 25 UNITS: 100 INJECTION, SOLUTION SUBCUTANEOUS at 21:08

## 2022-08-01 RX ADMIN — AMLODIPINE BESYLATE 10 MG: 10 TABLET ORAL at 21:07

## 2022-08-01 RX ADMIN — ATORVASTATIN CALCIUM 40 MG: 40 TABLET, FILM COATED ORAL at 21:06

## 2022-08-01 RX ADMIN — ACETAMINOPHEN 325MG 650 MG: 325 TABLET ORAL at 02:01

## 2022-08-01 RX ADMIN — SODIUM CHLORIDE: 9 INJECTION, SOLUTION INTRAVENOUS at 14:44

## 2022-08-01 RX ADMIN — ONDANSETRON 4 MG: 2 INJECTION INTRAMUSCULAR; INTRAVENOUS at 08:28

## 2022-08-01 RX ADMIN — AMLODIPINE BESYLATE 10 MG: 10 TABLET ORAL at 02:01

## 2022-08-01 RX ADMIN — DULOXETINE HYDROCHLORIDE 60 MG: 60 CAPSULE, DELAYED RELEASE ORAL at 02:02

## 2022-08-01 RX ADMIN — SODIUM CHLORIDE, PRESERVATIVE FREE 10 ML: 5 INJECTION INTRAVENOUS at 21:07

## 2022-08-01 RX ADMIN — SODIUM CHLORIDE 500 ML: 9 INJECTION, SOLUTION INTRAVENOUS at 04:17

## 2022-08-01 RX ADMIN — MICONAZOLE NITRATE: 20.6 POWDER TOPICAL at 21:07

## 2022-08-01 RX ADMIN — ENOXAPARIN SODIUM 30 MG: 100 INJECTION SUBCUTANEOUS at 08:29

## 2022-08-01 RX ADMIN — ACETAMINOPHEN 325MG 650 MG: 325 TABLET ORAL at 14:54

## 2022-08-01 RX ADMIN — ACETAMINOPHEN 325MG 650 MG: 325 TABLET ORAL at 21:11

## 2022-08-01 RX ADMIN — ACETAMINOPHEN 325MG 650 MG: 325 TABLET ORAL at 08:28

## 2022-08-01 RX ADMIN — ATORVASTATIN CALCIUM 40 MG: 40 TABLET, FILM COATED ORAL at 02:02

## 2022-08-01 RX ADMIN — SODIUM CHLORIDE: 9 INJECTION, SOLUTION INTRAVENOUS at 01:44

## 2022-08-01 RX ADMIN — CEFTRIAXONE 1000 MG: 1 INJECTION, POWDER, FOR SOLUTION INTRAMUSCULAR; INTRAVENOUS at 14:54

## 2022-08-01 RX ADMIN — ENOXAPARIN SODIUM 30 MG: 100 INJECTION SUBCUTANEOUS at 21:10

## 2022-08-01 ASSESSMENT — PAIN DESCRIPTION - ORIENTATION
ORIENTATION: MID
ORIENTATION: MID

## 2022-08-01 ASSESSMENT — PAIN SCALES - GENERAL
PAINLEVEL_OUTOF10: 7
PAINLEVEL_OUTOF10: 3
PAINLEVEL_OUTOF10: 5

## 2022-08-01 ASSESSMENT — PAIN DESCRIPTION - DESCRIPTORS
DESCRIPTORS: ACHING
DESCRIPTORS: ACHING;DISCOMFORT;DULL
DESCRIPTORS: ACHING;NAGGING;GNAWING

## 2022-08-01 ASSESSMENT — PAIN DESCRIPTION - PAIN TYPE: TYPE: ACUTE PAIN

## 2022-08-01 ASSESSMENT — PAIN SCALES - WONG BAKER
WONGBAKER_NUMERICALRESPONSE: 0
WONGBAKER_NUMERICALRESPONSE: 0

## 2022-08-01 ASSESSMENT — PAIN DESCRIPTION - LOCATION
LOCATION: HEAD

## 2022-08-01 ASSESSMENT — PAIN DESCRIPTION - ONSET: ONSET: AWAKENED FROM SLEEP

## 2022-08-01 NOTE — ED NOTES
Patient was given [facility] time order Norvasc 10 mg, 2 hours later, blood pressure started to drop to low 90's. [place] NP, perfect serve and bolus infusing. Pt states she is feeling better.         Janell Wynne RN  08/01/22 3374

## 2022-08-01 NOTE — H&P
[facility]  History and Physical      CHIEF COMPLAINT:    Chief Complaint   Patient presents with    Fever     Fever, chills, vomiting, vaginal pressure for the last few days. Patient of Robbie Record, DO presents with:  UTI (urinary tract infection)    History of Present Illness:   Patient is a 50-year-old female with a past medical history of basal cell carcinoma on her scalp, diabetes, hyperlipidemia, hypertension, obesity, and seasonal allergies. Patient presented to the ED with complaints of fever, nausea vomiting diarrhea upper abdominal pain beginning a few days ago. Patient states her fever has been as high as 103 and she has been taking Tylenol. Patient states that she thought she might have a UTI so she started taking Azo. Patient is alert and oriented on examination. Patient denies any chest pain, shortness of breath, headache, and dizziness. ER work-up revealed elevated WBC of 22.9, urinalysis positive, and CAT scan abdomen and pelvis likely pyelonephritis. Patient was started on IV antibiotics and admitted to telemetry unit for further testing and treatment. REVIEW OF SYSTEMS:  Pertinent negatives are above in HPI. 10 point ROS otherwise negative.       Past Medical History:   Diagnosis Date    Cancer ([address] 2012    basal cell scalp    Diabetes mellitus (HCC)     Hyperlipidemia     Hypertension     Nerve pain     right foot    Obese     Seasonal allergies          Past Surgical History:   Procedure Laterality Date    ANESTHESIA NERVE BLOCK Right 6/18/2020    RIGHT LUMBAR SYMPATHETIC BLOCK UNDER FLUORO performed by Sadie Kenney MD at [address]  years ago    removal cysts    NEUROMA SURGERY Right     done x 3 / last one 2/2019    PAIN MANAGEMENT PROCEDURE N/A 9/2/2021    SPINAL CORD STIMULATOR TRIAL WITH Larraine Claudette  (CPT 74560) performed by Sadie Kenney MD at [address] Right 12/2/2021    LUMBAR TRANSFORAMINAL EPIDURAL STEROID INJECTION RIGHT L5 AND S1 UNDER FLUOROSCOPIC GUIDANCE performed by Amrita Frazier MD at [address] N/A 6/2/2022    SPINAL CORD STIMULATOR TRIAL WITH [employer] performed by Amrita Frazier MD at [facility] Left     rotator cuff done x 3 / last one 2012    SKIN CANCER EXCISION  2012    basal cell ca scalp excision       Medications Prior to Admission:    Medications Prior to Admission: amLODIPine (NORVASC) 10 MG tablet, Take 10 mg by mouth at bedtime   losartan-hydroCHLOROthiazide (HYZAAR) 100-25 MG per tablet, TAKE 1 TABLET BY MOUTH EVERY DAY  B-D UF III MINI PEN NEEDLES 31G X 5 MM MISC, USE 4 TIMES A DAY  DULoxetine (CYMBALTA) 60 MG extended release capsule, Take 1 capsule by mouth nightly  vitamin D (ERGOCALCIFEROL) 1.25 MG (67585 UT) CAPS capsule, Take 50,000 Units by mouth once a week  Semaglutide,0.25 or 0.5MG/DOS, (OZEMPIC, 0.25 OR 0.5 MG/DOSE,) 2 MG/1.5ML SOPN, Inject 0.5 mg into the skin once a week Takes every Monday  atorvastatin (LIPITOR) 40 MG tablet, Take 60 mg by mouth nightly   insulin glargine (LANTUS SOLOSTAR) 100 UNIT/ML injection pen, Inject 25 Units into the skin nightly (Patient taking differently: Inject 70 Units into the skin nightly )  insulin lispro (HUMALOG KWIKPEN) 100 UNIT/ML pen, Inject 4 Units into the skin 3 times daily (before meals) (Patient taking differently: Inject 8 Units into the skin 3 times daily (before meals) )  fluticasone (FLONASE) 50 MCG/ACT nasal spray, 1 spray by Each Nare route daily  Lancets MISC, 1 each by Does not apply route daily  Insulin Pen Needle 31G X 6 MM MISC, 1 each by Does not apply route daily    Note that the patient's home medications were reviewed and the above list is accurate to the best of my knowledge at the time of the exam.    Allergies:    Patient has no known allergies. Social History:    reports that she quit smoking about 17 years ago.  Her smoking use included cigarettes. She has a 12.00 pack-year smoking history. She has never used smokeless tobacco. She reports current alcohol use. She reports that she does not use drugs. Family History:   family history includes Heart Attack in her maternal grandmother, mother, and another family member; Heart Attack (age of onset: 48) in her father; Mult Sclerosis in her sister. PHYSICAL EXAM:    Vitals:  BP (!) 111/51   Pulse (!) 126   Temp 99.1 °F (37.3 °C) (Oral)   Resp 18   Ht 5' 5\" (1.651 m)   Wt 273 lb (123.8 kg)   LMP  (LMP Unknown)   SpO2 93%   BMI 45.43 kg/m²       General appearance: NAD, conversant, pleasant, obese  Eyes: Sclerae anicteric, PERRLA  HEENT: AT/NC, MMM  Neck: FROM, supple, no thyromegaly  Lymph: No cervical / supraclavicular lymphadenopathy  Lungs: Clear to auscultation, WOB normal  CV: Irregular, no MRGs, bilateral lower extremity edema  Abdomen: Soft, non-tender; no masses or HSM, +BS  Extremities: FROM without synovitis. No clubbing or cyanosis of the hands. Skin: no rash, induration, lesions, or ulcers  Psych: Calm and cooperative. Normal judgement and insight. Normal mood and affect. Neuro: Alert and interactive, face symmetric, speech fluent. LABS:  All labs reviewed.   Of note:  CBC with Differential:    Lab Results   Component Value Date/Time    WBC 19.0 08/01/2022 06:00 AM    RBC 3.43 08/01/2022 06:00 AM    HGB 9.4 08/01/2022 06:00 AM    HCT 28.0 08/01/2022 06:00 AM     08/01/2022 06:00 AM    MCV 81.6 08/01/2022 06:00 AM    MCH 27.4 08/01/2022 06:00 AM    MCHC 33.6 08/01/2022 06:00 AM    RDW 13.4 08/01/2022 06:00 AM    SEGSPCT 66 08/30/2013 05:10 AM    LYMPHOPCT 3.2 08/01/2022 06:00 AM    MONOPCT 5.1 08/01/2022 06:00 AM    BASOPCT 0.2 08/01/2022 06:00 AM    MONOSABS 0.97 08/01/2022 06:00 AM    LYMPHSABS 0.61 08/01/2022 06:00 AM    EOSABS 0.02 08/01/2022 06:00 AM    BASOSABS 0.04 08/01/2022 06:00 AM     CMP:    Lab Results   Component Value Date/Time     08/01/2022 06:00 AM    K 3.6 08/01/2022 06:00 AM     08/01/2022 06:00 AM    CO2 20 08/01/2022 06:00 AM    BUN 16 08/01/2022 06:00 AM    CREATININE 1.4 08/01/2022 06:00 AM    GFRAA 48 08/01/2022 06:00 AM    LABGLOM 39 08/01/2022 06:00 AM    GLUCOSE 177 08/01/2022 06:00 AM    PROT 7.6 07/31/2022 02:41 PM    LABALBU 3.6 07/31/2022 02:41 PM    CALCIUM 7.3 08/01/2022 06:00 AM    BILITOT 0.9 07/31/2022 02:41 PM    ALKPHOS 135 07/31/2022 02:41 PM    AST 22 07/31/2022 02:41 PM    ALT 24 07/31/2022 02:41 PM       Imaging:  CT Abd/Pelvis:  Nonspecific perinephric edema, which given the history is likely due to an acute medical renal disease. Cholelithiasis. Moderate calcified atherosclerosis in the abdominal aorta, the extent of which is somewhat greater than is typical for age. No aneurysm. EKG:  I've personally reviewed the patient's EKG:  ST     Telemetry:    Sinus tach    ASSESSMENT/PLAN:  Principal Problem:    UTI (urinary tract infection)  Resolved Problems:    * No resolved hospital problems. *    49 year old morbidly obese female with a past medical history of basal cell carcinoma, and hypertension is admitted to telemetry unit with    UTI/pyelonephritis  Monitor labs-WBC 19  Strict I's and O's  IV hydration-increased to 125 cc an hour given sepsis  IV antibiotics in the form of Rocephin 1 g every 24 hours  Positive blood cultures Enterobacteriaceae and E. coli  Consult ID-await input for titration of antibiotic therapy  Check retroperitoneal ultrasound-she denies any flank pain however nonspecific perinephric edema is noted on CT abdomen pelvis    Diabetes Mellitus  -Monitor labs  -ISS glucose control  -Hypoglycemia protocol initiated    Medication for other comorbidities continue as appropriate dose adjustment as necessary. DVT prophylaxis  PT OT  Discharge planning  Case discussed with attending and agreed upon plan of care.       Code status: Full  Requires inpatient level of care  Black Dela Cruz RONI May CNP    9:32 AM  8/1/2022     Above note edited to reflect my thoughts     I personally saw, examined and provided care for the patient. Radiographs, labs and medication list were reviewed by me independently. The case was discussed in detail and plans for care were established. Review of Salma AUSTIN CNP, documentation was conducted and revisions were made as appropriate directly by me. I agree with the above documented exam, problem list, and plan of care.      Ginny Campbell MD  8:24 PM  8/1/2022

## 2022-08-01 NOTE — PROGRESS NOTES
Occupational Therapy    OT order received. Chart reviewed. Per speaking with RN and pt, pt completes ADLs/functional mobility/transfers Mod I, demo'ing good balance/safety awareness. She reports  no DME needs for home. Pt demonstrates no OT needs at this time. OT order discontinued. Please re-consult if there is a change in status.     Martin Prater, [facility], OTR/L [id number]

## 2022-08-01 NOTE — CARE COORDINATION
Patient presented to the ED for fever, nausea and vomiting; admitted for UTI. Met with patient at bedside for transition of care planning. Patient lives with her family in a home, is independent without a device and reports no home DME. No hx of home care or HATTIE. Uses [facility]) and PCP is Dr. Sp Briones. Plan is home, patient reports no needs and  to transport. Patient  currently on IV Rocephin for UTI.     Riky Alvarez, MSW, LSW [phone]

## 2022-08-01 NOTE — ED NOTES
Yue Heller NP, perfect serve for blood pressure drop 91/48       Hattie De La Garza, RN  08/01/22 [id number]

## 2022-08-02 LAB
ANION GAP SERPL CALCULATED.3IONS-SCNC: 12 MMOL/L (ref 7–16)
BUN BLDV-MCNC: 11 MG/DL (ref 6–20)
CALCIUM SERPL-MCNC: 7.2 MG/DL (ref 8.6–10.2)
CHLORIDE BLD-SCNC: 106 MMOL/L (ref 98–107)
CO2: 19 MMOL/L (ref 22–29)
CREAT SERPL-MCNC: 1.1 MG/DL (ref 0.5–1)
GFR AFRICAN AMERICAN: >60
GFR NON-AFRICAN AMERICAN: 52 ML/MIN/1.73
GLUCOSE BLD-MCNC: 138 MG/DL (ref 74–99)
HCT VFR BLD CALC: 26.7 % (ref 34–48)
HEMOGLOBIN: 8.8 G/DL (ref 11.5–15.5)
MCH RBC QN AUTO: 27.1 PG (ref 26–35)
MCHC RBC AUTO-ENTMCNC: 33 % (ref 32–34.5)
MCV RBC AUTO: 82.2 FL (ref 80–99.9)
METER GLUCOSE: 116 MG/DL (ref 74–99)
METER GLUCOSE: 123 MG/DL (ref 74–99)
METER GLUCOSE: 135 MG/DL (ref 74–99)
METER GLUCOSE: 140 MG/DL (ref 74–99)
ORGANISM: ABNORMAL
PDW BLD-RTO: 13.4 FL (ref 11.5–15)
PLATELET # BLD: 185 E9/L (ref 130–450)
PMV BLD AUTO: 10.3 FL (ref 7–12)
POTASSIUM SERPL-SCNC: 3.2 MMOL/L (ref 3.5–5)
RBC # BLD: 3.25 E12/L (ref 3.5–5.5)
SODIUM BLD-SCNC: 137 MMOL/L (ref 132–146)
URINE CULTURE, ROUTINE: ABNORMAL
WBC # BLD: 14.7 E9/L (ref 4.5–11.5)

## 2022-08-02 PROCEDURE — 6360000002 HC RX W HCPCS: Performed by: SPECIALIST

## 2022-08-02 PROCEDURE — 2580000003 HC RX 258: Performed by: SPECIALIST

## 2022-08-02 PROCEDURE — 6370000000 HC RX 637 (ALT 250 FOR IP): Performed by: NURSE PRACTITIONER

## 2022-08-02 PROCEDURE — 2580000003 HC RX 258: Performed by: NURSE PRACTITIONER

## 2022-08-02 PROCEDURE — 6370000000 HC RX 637 (ALT 250 FOR IP): Performed by: FAMILY MEDICINE

## 2022-08-02 PROCEDURE — 2140000000 HC CCU INTERMEDIATE R&B

## 2022-08-02 PROCEDURE — 82962 GLUCOSE BLOOD TEST: CPT

## 2022-08-02 PROCEDURE — 85027 COMPLETE CBC AUTOMATED: CPT

## 2022-08-02 PROCEDURE — 80048 BASIC METABOLIC PNL TOTAL CA: CPT

## 2022-08-02 PROCEDURE — 6360000002 HC RX W HCPCS: Performed by: NURSE PRACTITIONER

## 2022-08-02 PROCEDURE — 2580000003 HC RX 258: Performed by: INTERNAL MEDICINE

## 2022-08-02 PROCEDURE — 36415 COLL VENOUS BLD VENIPUNCTURE: CPT

## 2022-08-02 RX ORDER — HYDROCODONE BITARTRATE AND ACETAMINOPHEN 5; 325 MG/1; MG/1
1 TABLET ORAL EVERY 6 HOURS PRN
Status: DISCONTINUED | OUTPATIENT
Start: 2022-08-02 | End: 2022-08-04 | Stop reason: HOSPADM

## 2022-08-02 RX ORDER — POTASSIUM CHLORIDE 20 MEQ/1
40 TABLET, EXTENDED RELEASE ORAL ONCE
Status: COMPLETED | OUTPATIENT
Start: 2022-08-02 | End: 2022-08-02

## 2022-08-02 RX ADMIN — ONDANSETRON 4 MG: 2 INJECTION INTRAMUSCULAR; INTRAVENOUS at 10:19

## 2022-08-02 RX ADMIN — DULOXETINE HYDROCHLORIDE 60 MG: 60 CAPSULE, DELAYED RELEASE ORAL at 20:48

## 2022-08-02 RX ADMIN — ONDANSETRON 4 MG: 2 INJECTION INTRAMUSCULAR; INTRAVENOUS at 02:40

## 2022-08-02 RX ADMIN — MICONAZOLE NITRATE: 20.6 POWDER TOPICAL at 08:39

## 2022-08-02 RX ADMIN — MICONAZOLE NITRATE: 20.6 POWDER TOPICAL at 20:49

## 2022-08-02 RX ADMIN — WATER 2000 MG: 1 INJECTION INTRAMUSCULAR; INTRAVENOUS; SUBCUTANEOUS at 14:59

## 2022-08-02 RX ADMIN — HYDROCODONE BITARTRATE AND ACETAMINOPHEN 1 TABLET: 5; 325 TABLET ORAL at 22:18

## 2022-08-02 RX ADMIN — HYDROCODONE BITARTRATE AND ACETAMINOPHEN 1 TABLET: 5; 325 TABLET ORAL at 15:57

## 2022-08-02 RX ADMIN — ENOXAPARIN SODIUM 30 MG: 100 INJECTION SUBCUTANEOUS at 08:38

## 2022-08-02 RX ADMIN — LOSARTAN POTASSIUM 100 MG: 50 TABLET, FILM COATED ORAL at 08:38

## 2022-08-02 RX ADMIN — ACETAMINOPHEN 325MG 650 MG: 325 TABLET ORAL at 02:44

## 2022-08-02 RX ADMIN — SODIUM CHLORIDE, PRESERVATIVE FREE 10 ML: 5 INJECTION INTRAVENOUS at 08:37

## 2022-08-02 RX ADMIN — ATORVASTATIN CALCIUM 40 MG: 40 TABLET, FILM COATED ORAL at 20:48

## 2022-08-02 RX ADMIN — ACETAMINOPHEN 325MG 650 MG: 325 TABLET ORAL at 10:19

## 2022-08-02 RX ADMIN — POTASSIUM CHLORIDE 40 MEQ: 1500 TABLET, EXTENDED RELEASE ORAL at 11:54

## 2022-08-02 RX ADMIN — SODIUM CHLORIDE: 9 INJECTION, SOLUTION INTRAVENOUS at 10:24

## 2022-08-02 RX ADMIN — SODIUM CHLORIDE, PRESERVATIVE FREE 10 ML: 5 INJECTION INTRAVENOUS at 20:50

## 2022-08-02 RX ADMIN — HYDROCHLOROTHIAZIDE 25 MG: 25 TABLET ORAL at 08:38

## 2022-08-02 RX ADMIN — ENOXAPARIN SODIUM 30 MG: 100 INJECTION SUBCUTANEOUS at 20:48

## 2022-08-02 ASSESSMENT — PAIN SCALES - GENERAL
PAINLEVEL_OUTOF10: 0
PAINLEVEL_OUTOF10: 3
PAINLEVEL_OUTOF10: 7
PAINLEVEL_OUTOF10: 0
PAINLEVEL_OUTOF10: 7

## 2022-08-02 ASSESSMENT — PAIN - FUNCTIONAL ASSESSMENT
PAIN_FUNCTIONAL_ASSESSMENT: ACTIVITIES ARE NOT PREVENTED

## 2022-08-02 ASSESSMENT — PAIN DESCRIPTION - FREQUENCY: FREQUENCY: INTERMITTENT

## 2022-08-02 ASSESSMENT — PAIN DESCRIPTION - DESCRIPTORS
DESCRIPTORS: ACHING
DESCRIPTORS: ACHING;DISCOMFORT;NAGGING
DESCRIPTORS: ACHING;DISCOMFORT;DULL

## 2022-08-02 ASSESSMENT — PAIN DESCRIPTION - LOCATION
LOCATION: HEAD

## 2022-08-02 ASSESSMENT — PAIN DESCRIPTION - ORIENTATION
ORIENTATION: MID
ORIENTATION: OTHER (COMMENT)

## 2022-08-02 ASSESSMENT — PAIN SCALES - WONG BAKER: WONGBAKER_NUMERICALRESPONSE: 0

## 2022-08-02 ASSESSMENT — PAIN DESCRIPTION - ONSET: ONSET: AWAKENED FROM SLEEP

## 2022-08-02 ASSESSMENT — PAIN DESCRIPTION - PAIN TYPE: TYPE: ACUTE PAIN

## 2022-08-02 NOTE — PROGRESS NOTES
[facility]                                Progress note    Subjective: The patient is awake and alert. Lying in bed complaining of malaise  No acute events overnight. Denies chest pain, angina, SOB     Objective:    BP (!) 109/54   Pulse (!) 106   Temp 98 °F (36.7 °C)   Resp 18   Ht 5' 5\" (1.651 m)   Wt 273 lb (123.8 kg)   LMP  (LMP Unknown)   SpO2 91%   BMI 45.43 kg/m²     In: 2148 [P.O.:120; I.V.:2028]  Out: -   In: 2148   Out: -     General appearance: NAD, conversant, ill appearing, obese  HEENT: AT/NC, MMM  Neck: FROM, supple  Lungs: Clear to auscultation  CV: RRR, no MRGs  Vasc: Radial pulses 2+  Abdomen: Soft, non-tender; no masses or HSM  Extremities: bilateral lower ext. Edema improving  Skin: no rash, lesions or ulcers  Psych: Alert and oriented to person, place and time  Neuro: Alert and interactive, 5/5 strength in all extremities     Recent Labs     07/31/22  1530 08/01/22  0600 08/02/22  0419   WBC 22.9* 19.0* 14.7*   HGB 11.6 9.4* 8.8*   HCT 34.2 28.0* 26.7*    184 185       Recent Labs     07/31/22  1441 08/01/22  0600 08/02/22  0419   * 137 137   K 3.2* 3.6 3.2*   CL 93* 105 106   CO2 20* 20* 19*   BUN 18 16 11   CREATININE 1.6* 1.4* 1.1*   CALCIUM 8.3* 7.3* 7.2*       Assessment:    Principal Problem:    UTI (urinary tract infection)  Resolved Problems:    * No resolved hospital problems.  *      Plan:  45year old morbidly obese female with a past medical history of basal cell carcinoma, and hypertension is admitted to telemetry unit with     UTI/pyelonephritis  Monitor labs-WBC 19>14.7  Strict I's and O's  IV hydration - NS @ 125  IV antibiotics in the form of Rocephin 2 g every 24 hours  Positive blood cultures Enterobacteriaceae and E. coli  Consult ID- increased Rocephin to 2 gm daily - appreciate input  Check retroperitoneal ultrasound-she denies any flank pain however nonspecific perinephric edema is noted on CT abdomen pelvis - ultrasound pending  Supplement potassium  today 3.2 - repeat bmp in am     Diabetes Mellitus  -Monitor labs  -ISS glucose control  -Hypoglycemia protocol initiated    DVT Prophylaxis - lovenox  PT/OT  Discharge planning     RONI Woodard CNP  11:50 AM  8/2/2022     Above note edited to reflect my thoughts     I personally saw, examined and provided care for the patient. Radiographs, labs and medication list were reviewed by me independently. The case was discussed in detail and plans for care were established. Review of Salma AUSTIN CNP, documentation was conducted and revisions were made as appropriate directly by me. I agree with the above documented exam, problem list, and plan of care.      Chelsea Cox MD  6:01 PM  8/2/2022

## 2022-08-02 NOTE — PROGRESS NOTES
Physical Therapy    Date: 2022       Patient Name: Octavio Morin  : 1970      MRN: [id number]    PT order received. Chart has been reviewed. Patient Ind for mobility no device. No PT eval completed, if PT needs arise please consult again.       Brayan Bob, PT

## 2022-08-02 NOTE — CONSULTS
[address] [place] [address] [facility]    Consultation Note     Admit Date: 7/31/2022  1:40 PM    Reason for Consult:   E. coli septicemia    Attending Physician:  Shaquille Sheehan MD     Chief Complaint: Nausea vomiting and loose bowel movements with upper abdominal pain    HISTORY OF PRESENT ILLNESS:   The patient is a 45 y.o. woman not known to the Infectious Diseases service. The patient is admitted with nausea vomiting fever and loose bowel movements. She had abdominal pain that was persistent and temperatures were clocked in at 103. She has been COVID-negative x2. She is somewhat obese besides diabetes. CT of the abdomen pelvis emergency room demonstrated perinephric edema but there was no hydronephrosis. Wound cultures on admission showed a white count of 22.9 with a hemoglobin 11.6. With hydration the BUN and creatinine went from 18 and 1.6-11 and 1.1 and the white count has come down to 14.7. Blood cultures are positive for gram-negative rods and by the biological PCR platform indicates this E. coli. The E. coli in the urine was sensitive to everything and the patient is on ceftriaxone 1 g daily        Past Medical History:        Diagnosis Date    Cancer ([address] 2012    basal cell scalp    Diabetes mellitus ([facility]     Hyperlipidemia     Hypertension     Nerve pain     right foot    Obese     Seasonal allergies      Past Surgical History:        Procedure Laterality Date    ANESTHESIA NERVE BLOCK Right 6/18/2020    RIGHT LUMBAR SYMPATHETIC BLOCK UNDER FLUORO performed by Deandra Montlavo MD at [address]  years ago    removal cysts    NEUROMA SURGERY Right     done x 3 / last one 2/2019    PAIN MANAGEMENT PROCEDURE N/A 9/2/2021    SPINAL CORD STIMULATOR TRIAL WITH Lacey To  (CPT 90404) performed by Deandra Montalvo MD at [address] Right 12/2/2021    LUMBAR TRANSFORAMINAL EPIDURAL STEROID INJECTION RIGHT L5 AND S1 UNDER FLUOROSCOPIC GUIDANCE performed by Jeannie Abdul MD at [address] N/A 2022    SPINAL CORD STIMULATOR TRIAL WITH [place] performed by Jeannie Abdul MD at [address] Left     rotator cuff done x 3 / last one     SKIN CANCER EXCISION  2012    basal cell ca scalp excision     Current Medications:   Scheduled Meds:   amLODIPine  10 mg Oral Nightly    atorvastatin  40 mg Oral Nightly    DULoxetine  60 mg Oral Nightly    insulin glargine-yfgn  25 Units SubCUTAneous Nightly    sodium chloride flush  5-40 mL IntraVENous 2 times per day    enoxaparin  30 mg SubCUTAneous BID    insulin lispro  0-16 Units SubCUTAneous TID WC    insulin lispro  0-4 Units SubCUTAneous Nightly    cefTRIAXone (ROCEPHIN) IV  1,000 mg IntraVENous Q24H    losartan  100 mg Oral Daily    And    hydroCHLOROthiazide  25 mg Oral Daily    miconazole   Topical BID     Continuous Infusions:   dextrose      sodium chloride 125 mL/hr at 22 1024    sodium chloride      sodium chloride 100 mL/hr at 22 1444     PRN Meds:glucose, dextrose bolus **OR** dextrose bolus, glucagon (rDNA), dextrose, sodium chloride flush, sodium chloride, magnesium hydroxide, ondansetron **OR** ondansetron, acetaminophen **OR** acetaminophen    Allergies:  Patient has no known allergies.     Social History:   Social History     Socioeconomic History    Marital status:      Spouse name: None    Number of children: None    Years of education: None    Highest education level: None   Tobacco Use    Smoking status: Former     Packs/day: 1.00     Years: 12.00     Pack years: 12.00     Types: Cigarettes     Quit date: 6/15/2005     Years since quittin.1    Smokeless tobacco: Never   Vaping Use    Vaping Use: Never used   Substance and Sexual Activity    Alcohol use: Yes     Comment: rarely    Drug use: Never       Family History:       Problem Relation Age of Onset    Heart Attack Mother     Heart Attack Father 48 Heart Attack Maternal Grandmother     Heart Attack Other     Mult Sclerosis Sister    . Otherwise non-pertinent to the chief complaint. REVIEW OF SYSTEMS:    CONSTITUTIONAL: Positive chills, fevers  night sweats. No loss of weight. EYES:  No double vision or drainage from eyes, ears or throat. HEENT:  No neck stiffness. No dysphagia. No drainage from eyes, ears or throat  RESPIRATORY:  No cough, productive sputum or hemoptysis. CARDIOVASCULAR:  No chest pain, palpitations, orthopnea or dyspnea on exertion. GASTROINTESTINAL: Positive nausea, vomiting, diarrhea  GENITOURINARY:  No frequency burning dysuria or hematuria. INTEGUMENT/BREAST:  No rash or breast masses. HEMATOLOGIC/LYMPHATIC:  No lymphadenopathy or blood dyscrasics. ALLERGIC/IMMUNOLOGIC:  No anaphylaxis. ENDOCRINE:  No polyuria or polydipsia or temperature intolerance. MUSCULOSKELETAL:  No myalgia or arthralgia. Full ROM. NEUROLOGICAL:  No focal motor sensory deficit. BEHAVIOR/PSYCH:  No psychosis. PHYSICAL EXAM:    Vitals:    BP (!) 109/54   Pulse (!) 106   Temp 98 °F (36.7 °C)   Resp 18   Ht 5' 5\" (1.651 m)   Wt 273 lb (123.8 kg)   LMP  (LMP Unknown)   SpO2 91%   BMI 45.43 kg/m²   Constitutional: The patient is awake, alert, and oriented. Skin: Warm and dry. No rashes were noted. No jaundice. Rosacea nose  HEENT: Eyes show round, and reactive pupils. Moist mucous membranes, no ulcerations, no thrush. Neck: Supple to movements. No lymphadenopathy. Chest: No use of accessory muscles to breathe. Symmetrical expansion. Auscultation reveals no wheezing, crackles, or rhonchi. Cardiovascular: S1 and S2 are rhythmic and regular. No murmurs appreciated. Abdomen: Positive bowel sounds to auscultation. Benign to palpation. No masses felt. No hepatosplenomegaly. Genitourinary: Female  Extremities: No clubbing, no cyanosis, no edema.   Musculoskeletal:   Neurological: No focal  Lines: peripheral      CBC+dif:  Recent Labs 07/31/22  1530 08/01/22  0600 08/02/22  0419   WBC 22.9* 19.0* 14.7*   HGB 11.6 9.4* 8.8*   HCT 34.2 28.0* 26.7*   MCV 81.6 81.6 82.2    184 185   NEUTROABS 21.53* 16.96*  --      Lab Results   Component Value Date    CRP 0.3 03/19/2019     No results found for: CRPHS  No results found for: SEDRATE  Lab Results   Component Value Date    ALT 24 07/31/2022    AST 22 07/31/2022    ALKPHOS 135 (H) 07/31/2022    BILITOT 0.9 07/31/2022     Lab Results   Component Value Date/Time     08/02/2022 04:19 AM    K 3.2 08/02/2022 04:19 AM    K 3.6 08/01/2022 06:00 AM     08/02/2022 04:19 AM    CO2 19 08/02/2022 04:19 AM    BUN 11 08/02/2022 04:19 AM    CREATININE 1.1 08/02/2022 04:19 AM    GFRAA >60 08/02/2022 04:19 AM    LABGLOM 52 08/02/2022 04:19 AM    GLUCOSE 138 08/02/2022 04:19 AM    PROT 7.6 07/31/2022 02:41 PM    LABALBU 3.6 07/31/2022 02:41 PM    CALCIUM 7.2 08/02/2022 04:19 AM    BILITOT 0.9 07/31/2022 02:41 PM    ALKPHOS 135 07/31/2022 02:41 PM    AST 22 07/31/2022 02:41 PM    ALT 24 07/31/2022 02:41 PM       No results found for: PROTIME, INR    Lab Results   Component Value Date/Time    TSH 4.359 08/30/2013 05:10 AM       Lab Results   Component Value Date/Time    COLORU Yellow 07/31/2022 02:41 PM    PHUR 6.0 07/31/2022 02:41 PM    WBCUA PACKED 07/31/2022 02:41 PM    RBCUA 10-20 07/31/2022 02:41 PM    BACTERIA MANY 07/31/2022 02:41 PM    CLARITYU CLOUDY 07/31/2022 02:41 PM    SPECGRAV 1.025 07/31/2022 02:41 PM    LEUKOCYTESUR LARGE 07/31/2022 02:41 PM    UROBILINOGEN 1.0 07/31/2022 02:41 PM    BILIRUBINUR Negative 07/31/2022 02:41 PM    BLOODU LARGE 07/31/2022 02:41 PM    GLUCOSEU 100 07/31/2022 02:41 PM       No results found for: FCB3PQI, BEART, M7ULQHSY, PHART, THGBART, KUD8NGK, PO2ART, DUZ1VLW  Radiology:  CT ABDOMEN PELVIS WO CONTRAST Additional Contrast? None   Final Result   1.  Nonspecific perinephric edema, which given the history (UTI, fever, acute   kidney injury) is likely due to an acute medical renal disease (renal failure   or pyelonephritis). If indicated, contrast enhanced CT would be more   sensitive for pyelonephritis. 2. No urolithiasis or hydronephrosis. 3. Cholelithiasis. 4. Moderate calcified atherosclerosis in the abdominal aorta, the extent of   which is somewhat greater than is typical for age. No aneurysm. Microbiology:  Pending  Recent Labs     07/31/22  1400   BC Gram stain performed from blood culture bottle media  Gram negative rods  *     Recent Labs     07/31/22  1441   ORG Escherichia coli*     Recent Labs     07/31/22  1441   BLOODCULT2 Gram stain performed from blood culture bottle media  Gram negative rods  Previously positive blood culture called  *     No results for input(s): STREPNEUMAGU in the last 72 hours. No results for input(s): LP1UAG in the last 72 hours. No results for input(s): ASO in the last 72 hours. No results for input(s): CULTRESP in the last 72 hours. Assessment:  Pyelonephritis with E. coli septicemia complicated UTI  UTI  Rosacea    Plan:    Cont ceftriaxone 2 g daily  Check cultures  Baseline ESR, CRP  Monitor labs  Will follow with you    Thank you for having us see this patient in consultation. I will be discussing this case with the treating physicians.       Electronically signed by Vinny Burgess MD on 8/2/2022 at 10:39 AM

## 2022-08-03 LAB
ANION GAP SERPL CALCULATED.3IONS-SCNC: 15 MMOL/L (ref 7–16)
BUN BLDV-MCNC: 9 MG/DL (ref 6–20)
CALCIUM SERPL-MCNC: 7.9 MG/DL (ref 8.6–10.2)
CHLORIDE BLD-SCNC: 102 MMOL/L (ref 98–107)
CO2: 22 MMOL/L (ref 22–29)
CREAT SERPL-MCNC: 1 MG/DL (ref 0.5–1)
GFR AFRICAN AMERICAN: >60
GFR NON-AFRICAN AMERICAN: 58 ML/MIN/1.73
GLUCOSE BLD-MCNC: 114 MG/DL (ref 74–99)
HCT VFR BLD CALC: 28.6 % (ref 34–48)
HEMOGLOBIN: 9.6 G/DL (ref 11.5–15.5)
MCH RBC QN AUTO: 27.8 PG (ref 26–35)
MCHC RBC AUTO-ENTMCNC: 33.6 % (ref 32–34.5)
MCV RBC AUTO: 82.9 FL (ref 80–99.9)
METER GLUCOSE: 102 MG/DL (ref 74–99)
METER GLUCOSE: 139 MG/DL (ref 74–99)
METER GLUCOSE: 152 MG/DL (ref 74–99)
METER GLUCOSE: 206 MG/DL (ref 74–99)
PDW BLD-RTO: 13.6 FL (ref 11.5–15)
PLATELET # BLD: 204 E9/L (ref 130–450)
PMV BLD AUTO: 10.2 FL (ref 7–12)
POTASSIUM SERPL-SCNC: 3.3 MMOL/L (ref 3.5–5)
RBC # BLD: 3.45 E12/L (ref 3.5–5.5)
SODIUM BLD-SCNC: 139 MMOL/L (ref 132–146)
WBC # BLD: 13 E9/L (ref 4.5–11.5)

## 2022-08-03 PROCEDURE — 6370000000 HC RX 637 (ALT 250 FOR IP): Performed by: FAMILY MEDICINE

## 2022-08-03 PROCEDURE — 6360000002 HC RX W HCPCS: Performed by: SPECIALIST

## 2022-08-03 PROCEDURE — 6370000000 HC RX 637 (ALT 250 FOR IP): Performed by: NURSE PRACTITIONER

## 2022-08-03 PROCEDURE — 80048 BASIC METABOLIC PNL TOTAL CA: CPT

## 2022-08-03 PROCEDURE — 2580000003 HC RX 258: Performed by: INTERNAL MEDICINE

## 2022-08-03 PROCEDURE — 2580000003 HC RX 258: Performed by: SPECIALIST

## 2022-08-03 PROCEDURE — 85027 COMPLETE CBC AUTOMATED: CPT

## 2022-08-03 PROCEDURE — 36415 COLL VENOUS BLD VENIPUNCTURE: CPT

## 2022-08-03 PROCEDURE — 82962 GLUCOSE BLOOD TEST: CPT

## 2022-08-03 PROCEDURE — 2580000003 HC RX 258: Performed by: NURSE PRACTITIONER

## 2022-08-03 PROCEDURE — 6360000002 HC RX W HCPCS: Performed by: NURSE PRACTITIONER

## 2022-08-03 PROCEDURE — 2140000000 HC CCU INTERMEDIATE R&B

## 2022-08-03 RX ORDER — CEFDINIR 300 MG/1
300 CAPSULE ORAL 2 TIMES DAILY
Qty: 18 CAPSULE | Refills: 0 | Status: SHIPPED | OUTPATIENT
Start: 2022-08-03 | End: 2022-08-12

## 2022-08-03 RX ORDER — POTASSIUM CHLORIDE 20 MEQ/1
40 TABLET, EXTENDED RELEASE ORAL ONCE
Status: COMPLETED | OUTPATIENT
Start: 2022-08-03 | End: 2022-08-03

## 2022-08-03 RX ORDER — POTASSIUM CHLORIDE 20 MEQ/1
40 TABLET, EXTENDED RELEASE ORAL ONCE
Status: DISCONTINUED | OUTPATIENT
Start: 2022-08-03 | End: 2022-08-04 | Stop reason: HOSPADM

## 2022-08-03 RX ADMIN — ENOXAPARIN SODIUM 30 MG: 100 INJECTION SUBCUTANEOUS at 08:35

## 2022-08-03 RX ADMIN — HYDROCHLOROTHIAZIDE 25 MG: 25 TABLET ORAL at 08:35

## 2022-08-03 RX ADMIN — WATER 2000 MG: 1 INJECTION INTRAMUSCULAR; INTRAVENOUS; SUBCUTANEOUS at 17:18

## 2022-08-03 RX ADMIN — SODIUM CHLORIDE: 9 INJECTION, SOLUTION INTRAVENOUS at 02:07

## 2022-08-03 RX ADMIN — SODIUM CHLORIDE, PRESERVATIVE FREE 10 ML: 5 INJECTION INTRAVENOUS at 20:20

## 2022-08-03 RX ADMIN — SODIUM CHLORIDE, PRESERVATIVE FREE 10 ML: 5 INJECTION INTRAVENOUS at 08:35

## 2022-08-03 RX ADMIN — INSULIN LISPRO 4 UNITS: 100 INJECTION, SOLUTION INTRAVENOUS; SUBCUTANEOUS at 18:41

## 2022-08-03 RX ADMIN — POTASSIUM CHLORIDE 40 MEQ: 1500 TABLET, EXTENDED RELEASE ORAL at 08:35

## 2022-08-03 RX ADMIN — LOSARTAN POTASSIUM 100 MG: 50 TABLET, FILM COATED ORAL at 08:35

## 2022-08-03 RX ADMIN — MICONAZOLE NITRATE: 20.6 POWDER TOPICAL at 08:34

## 2022-08-03 RX ADMIN — ATORVASTATIN CALCIUM 40 MG: 40 TABLET, FILM COATED ORAL at 20:19

## 2022-08-03 RX ADMIN — AMLODIPINE BESYLATE 10 MG: 10 TABLET ORAL at 20:19

## 2022-08-03 RX ADMIN — HYDROCODONE BITARTRATE AND ACETAMINOPHEN 1 TABLET: 5; 325 TABLET ORAL at 08:34

## 2022-08-03 RX ADMIN — ONDANSETRON 4 MG: 4 TABLET, ORALLY DISINTEGRATING ORAL at 08:34

## 2022-08-03 RX ADMIN — MICONAZOLE NITRATE: 20.6 POWDER TOPICAL at 20:22

## 2022-08-03 RX ADMIN — DULOXETINE HYDROCHLORIDE 60 MG: 60 CAPSULE, DELAYED RELEASE ORAL at 20:19

## 2022-08-03 ASSESSMENT — PAIN SCALES - GENERAL
PAINLEVEL_OUTOF10: 7
PAINLEVEL_OUTOF10: 0
PAINLEVEL_OUTOF10: 0

## 2022-08-03 ASSESSMENT — PAIN DESCRIPTION - LOCATION: LOCATION: HEAD

## 2022-08-03 ASSESSMENT — PAIN DESCRIPTION - DESCRIPTORS: DESCRIPTORS: ACHING

## 2022-08-03 NOTE — PROGRESS NOTES
[address] [place] [address] [facility]  Progress Note      Chief Complaint   Patient presents with    Fever     Fever, chills, vomiting, vaginal pressure for the last few days. SUBJECTIVE:  Patient is tolerating medications. No reported adverse drug reactions. No nausea, vomiting, diarrhea. Chair tolerating p.o. doing well  Had a low-grade temperature yesterday but she is defervesced although she still has a white count elevation    Review of systems:  As stated above in the chief complaint, otherwise negative. Medications:  Scheduled Meds:   potassium chloride  40 mEq Oral Once    cefTRIAXone (ROCEPHIN) IV  2,000 mg IntraVENous Q24H    amLODIPine  10 mg Oral Nightly    atorvastatin  40 mg Oral Nightly    DULoxetine  60 mg Oral Nightly    insulin glargine-yfgn  25 Units SubCUTAneous Nightly    sodium chloride flush  5-40 mL IntraVENous 2 times per day    enoxaparin  30 mg SubCUTAneous BID    insulin lispro  0-16 Units SubCUTAneous TID WC    insulin lispro  0-4 Units SubCUTAneous Nightly    losartan  100 mg Oral Daily    And    hydroCHLOROthiazide  25 mg Oral Daily    miconazole   Topical BID     Continuous Infusions:   dextrose      sodium chloride       PRN Meds:HYDROcodone 5 mg - acetaminophen, glucose, dextrose bolus **OR** dextrose bolus, glucagon (rDNA), dextrose, sodium chloride flush, sodium chloride, magnesium hydroxide, ondansetron **OR** ondansetron, acetaminophen **OR** acetaminophen    OBJECTIVE:  BP (!) 145/67   Pulse (!) 112   Temp 97.6 °F (36.4 °C) (Oral)   Resp 16   Ht 5' 5\" (1.651 m)   Wt 273 lb (123.8 kg)   LMP  (LMP Unknown)   SpO2 92%   BMI 45.43 kg/m²   Temp  Av.2 °F (36.8 °C)  Min: 97.6 °F (36.4 °C)  Max: 98.8 °F (37.1 °C)  Constitutional: The patient is awake, alert, and oriented. Skin: Warm and dry. No rashes were noted. HEENT: Round and reactive pupils. Moist mucous membranes. No ulcerations or thrush. Neck: Supple to movements.    Chest: No use of accessory muscles to breathe. Symmetrical expansion. No wheezing, crackles or rhonchi. Cardiovascular: S1 and S2 are rhythmic and regular. No murmurs appreciated. Abdomen: Positive bowel sounds to auscultation. Benign to palpation. No masses felt. No hepatosplenomegaly. Genitourinary: Female  Extremities: No clubbing, no cyanosis, no edema.   Lines: peripheral    Laboratory and Tests Review:  Lab Results   Component Value Date    WBC 13.0 (H) 08/03/2022    WBC 14.7 (H) 08/02/2022    WBC 19.0 (H) 08/01/2022    HGB 9.6 (L) 08/03/2022    HCT 28.6 (L) 08/03/2022    MCV 82.9 08/03/2022     08/03/2022     Lab Results   Component Value Date    NEUTROABS 16.96 (H) 08/01/2022    NEUTROABS 21.53 (H) 07/31/2022    NEUTROABS 6.96 02/09/2021     No results found for: [facility]  Lab Results   Component Value Date    ALT 24 07/31/2022    AST 22 07/31/2022    ALKPHOS 135 (H) 07/31/2022    BILITOT 0.9 07/31/2022     Lab Results   Component Value Date/Time     08/03/2022 04:13 AM    K 3.3 08/03/2022 04:13 AM    K 3.6 08/01/2022 06:00 AM     08/03/2022 04:13 AM    CO2 22 08/03/2022 04:13 AM    BUN 9 08/03/2022 04:13 AM    CREATININE 1.0 08/03/2022 04:13 AM    CREATININE 1.1 08/02/2022 04:19 AM    CREATININE 1.4 08/01/2022 06:00 AM    GFRAA >60 08/03/2022 04:13 AM    LABGLOM 58 08/03/2022 04:13 AM    GLUCOSE 114 08/03/2022 04:13 AM    PROT 7.6 07/31/2022 02:41 PM    LABALBU 3.6 07/31/2022 02:41 PM    CALCIUM 7.9 08/03/2022 04:13 AM    BILITOT 0.9 07/31/2022 02:41 PM    ALKPHOS 135 07/31/2022 02:41 PM    AST 22 07/31/2022 02:41 PM    ALT 24 07/31/2022 02:41 PM     Lab Results   Component Value Date    CRP 0.3 03/19/2019     No results found for: [address]  Radiology:      Microbiology:   Lab Results   Component Value Date/Time    [facility]  07/31/2022 02:00 PM     Gram stain performed from blood culture bottle media  Gram negative rods      BC Sensitivity to follow 07/31/2022 02:00 PM    ORG Escherichia coli 07/31/2022 02:41 PM    ORG Escherichia coli 07/31/2022 02:41 PM    ORG Escherichia coli 07/31/2022 02:00 PM     Lab Results   Component Value Date/Time    BLOODCULT2  07/31/2022 02:41 PM     Gram stain performed from blood culture bottle media  Gram negative rods  Previously positive blood culture called      BLOODCULT2  07/31/2022 02:41 PM     Refer to previous culture for susceptibility results  of CXBL collected 07/31/22 at 14:00      ORG Escherichia coli 07/31/2022 02:41 PM    ORG Escherichia coli 07/31/2022 02:41 PM    ORG Escherichia coli 07/31/2022 02:00 PM     WOUND/ABSCESS   Date Value Ref Range Status   03/18/2019 Moderate growth  Final     No results found for: RESPSMEAR  No results found for: MPNEUMO, CLAMYDCU, LABLEGI, AFBCX, FUNGSM, LABFUNG  No results found for: CULTRESP  No results found for: CXCATHTIP  No results found for: BFCS  No results found for: CXSURG  Urine Culture, Routine   Date Value Ref Range Status   07/31/2022 >100,000 CFU/ml  Final     MRSA Culture Only   Date Value Ref Range Status   03/18/2019 Methicillin resistant Staph aureus not isolated  Final     Microbiology:  7/31/2022 blood cultures 2 out of 2 sets E.  Coli  7/31/2022 urine E. coli    ASSESSMENT:  D UTI with pyelonephritis and E. coli septicemia and bacteremia    PLAN:  Continue ceftriaxone today with a final dose in the a.m. and subsequently discharged on p.o.  disussed with primary care  Check final cultures  Monitor labs    Harsh Boo MD  1:40 PM  8/3/2022

## 2022-08-03 NOTE — PROGRESS NOTES
[facility]                                Progress note    Subjective: The patient is awake and alert. Sitting up in bed looks 100% better  No acute events overnight. Denies chest pain, angina, SOB     Objective:    BP (!) 145/67   Pulse (!) 112   Temp 97.6 °F (36.4 °C) (Oral)   Resp 16   Ht 5' 5\" (1.651 m)   Wt 273 lb (123.8 kg)   LMP  (LMP Unknown)   SpO2 92%   BMI 45.43 kg/m²     In: 243 [I.V.:243]  Out: -   In: 243   Out: -     General appearance: NAD, conversant, pleasant obese  HEENT: AT/NC, MMM  Neck: FROM, supple  Lungs: Clear to auscultation  CV: RRR, no MRGs  Vasc: Radial pulses 2+  Abdomen: Soft, non-tender; no masses or HSM  Extremities: bilateral lower ext. Edema improving  Skin: no rash, lesions or ulcers  Psych: Alert and oriented to person, place and time  Neuro: Alert and interactive, 5/5 strength in all extremities     Recent Labs     08/01/22  0600 08/02/22  0419 08/03/22  0413   WBC 19.0* 14.7* 13.0*   HGB 9.4* 8.8* 9.6*   HCT 28.0* 26.7* 28.6*    185 204       Recent Labs     08/01/22  0600 08/02/22  0419 08/03/22  0413    137 139   K 3.6 3.2* 3.3*    106 102   CO2 20* 19* 22   BUN 16 11 9   CREATININE 1.4* 1.1* 1.0   CALCIUM 7.3* 7.2* 7.9*       Assessment:    Principal Problem:    UTI (urinary tract infection)  Resolved Problems:    * No resolved hospital problems.  *      Plan:  45year old morbidly obese female with a past medical history of basal cell carcinoma, and hypertension is admitted to telemetry unit with     UTI/pyelonephritis  Monitor labs-WBC 19>14.7>13  Strict I's and O's  IV hydration - NS @ 125 - discontinued  IV antibiotics in the form of Rocephin 2 g every 24 hours  Positive blood cultures Enterobacteriaceae and E. coli-+ from 7/31/2022  Consult ID- increased Rocephin to 2 gm daily - appreciate input  Supplement potassium  today 3.3 supplemented     Diabetes Mellitus  -Monitor labs  -ISS glucose control  -Hypoglycemia protocol initiated    Hopeful for discharge later today. DVT Prophylaxis - lovenox  PT/OT  Discharge planning-okay for discharge from medicine standpoint if okay with ID looks like tomorrow    RONI Mckinnon CNP  11:50 AM  8/3/2022     Above note edited to reflect my thoughts     I personally saw, examined and provided care for the patient. Radiographs, labs and medication list were reviewed by me independently. The case was discussed in detail and plans for care were established. Review of Salma AUSTIN CNP, documentation was conducted and revisions were made as appropriate directly by me. I agree with the above documented exam, problem list, and plan of care.      Efrain Peralta MD  3:09 PM  8/3/2022

## 2022-08-03 NOTE — PLAN OF CARE
Problem: Chronic Conditions and Co-morbidities  Goal: Patient's chronic conditions and co-morbidity symptoms are monitored and maintained or improved  Outcome: Progressing     Problem: Discharge Planning  Goal: Discharge to home or other facility with appropriate resources  Outcome: Progressing     Problem: Pain  Goal: Verbalizes/displays adequate comfort level or baseline comfort level  Outcome: Progressing     Problem: Safety - Adult  Goal: Free from fall injury  Outcome: Progressing     Problem: ABCDS Injury Assessment  Goal: Absence of physical injury  Outcome: Progressing

## 2022-08-03 NOTE — CARE COORDINATION
Patient continues on IV Rocephin Q24 for UTI and positive blood cultures; ID following. Plan is home, no needs and  to transport.     Jake Taylor, MSW, LSW [phone]

## 2022-08-04 VITALS
RESPIRATION RATE: 20 BRPM | HEIGHT: 65 IN | WEIGHT: 273 LBS | OXYGEN SATURATION: 94 % | BODY MASS INDEX: 45.48 KG/M2 | HEART RATE: 103 BPM | SYSTOLIC BLOOD PRESSURE: 141 MMHG | DIASTOLIC BLOOD PRESSURE: 89 MMHG | TEMPERATURE: 97.3 F

## 2022-08-04 LAB
ANION GAP SERPL CALCULATED.3IONS-SCNC: 16 MMOL/L (ref 7–16)
BUN BLDV-MCNC: 8 MG/DL (ref 6–20)
CALCIUM SERPL-MCNC: 8.6 MG/DL (ref 8.6–10.2)
CHLORIDE BLD-SCNC: 101 MMOL/L (ref 98–107)
CO2: 24 MMOL/L (ref 22–29)
CREAT SERPL-MCNC: 0.9 MG/DL (ref 0.5–1)
GFR AFRICAN AMERICAN: >60
GFR NON-AFRICAN AMERICAN: >60 ML/MIN/1.73
GLUCOSE BLD-MCNC: 147 MG/DL (ref 74–99)
HCT VFR BLD CALC: 30.5 % (ref 34–48)
HEMOGLOBIN: 10.2 G/DL (ref 11.5–15.5)
MCH RBC QN AUTO: 26.8 PG (ref 26–35)
MCHC RBC AUTO-ENTMCNC: 33.4 % (ref 32–34.5)
MCV RBC AUTO: 80.1 FL (ref 80–99.9)
METER GLUCOSE: 144 MG/DL (ref 74–99)
PDW BLD-RTO: 13.7 FL (ref 11.5–15)
PLATELET # BLD: 256 E9/L (ref 130–450)
PMV BLD AUTO: 10 FL (ref 7–12)
POTASSIUM SERPL-SCNC: 3 MMOL/L (ref 3.5–5)
RBC # BLD: 3.81 E12/L (ref 3.5–5.5)
SODIUM BLD-SCNC: 141 MMOL/L (ref 132–146)
WBC # BLD: 12.4 E9/L (ref 4.5–11.5)

## 2022-08-04 PROCEDURE — 6360000002 HC RX W HCPCS: Performed by: SPECIALIST

## 2022-08-04 PROCEDURE — 6370000000 HC RX 637 (ALT 250 FOR IP): Performed by: NURSE PRACTITIONER

## 2022-08-04 PROCEDURE — 6370000000 HC RX 637 (ALT 250 FOR IP): Performed by: FAMILY MEDICINE

## 2022-08-04 PROCEDURE — 36415 COLL VENOUS BLD VENIPUNCTURE: CPT

## 2022-08-04 PROCEDURE — 85027 COMPLETE CBC AUTOMATED: CPT

## 2022-08-04 PROCEDURE — 82962 GLUCOSE BLOOD TEST: CPT

## 2022-08-04 PROCEDURE — 2580000003 HC RX 258: Performed by: NURSE PRACTITIONER

## 2022-08-04 PROCEDURE — 80048 BASIC METABOLIC PNL TOTAL CA: CPT

## 2022-08-04 PROCEDURE — 2580000003 HC RX 258: Performed by: SPECIALIST

## 2022-08-04 PROCEDURE — 6360000002 HC RX W HCPCS: Performed by: NURSE PRACTITIONER

## 2022-08-04 RX ORDER — CEFDINIR 300 MG/1
300 CAPSULE ORAL EVERY 12 HOURS SCHEDULED
Status: DISCONTINUED | OUTPATIENT
Start: 2022-08-04 | End: 2022-08-04 | Stop reason: HOSPADM

## 2022-08-04 RX ORDER — ATORVASTATIN CALCIUM 40 MG/1
40 TABLET, FILM COATED ORAL NIGHTLY
Qty: 30 TABLET | Refills: 3 | Status: SHIPPED | OUTPATIENT
Start: 2022-08-04

## 2022-08-04 RX ORDER — POTASSIUM CHLORIDE 20 MEQ/1
40 TABLET, EXTENDED RELEASE ORAL ONCE
Status: COMPLETED | OUTPATIENT
Start: 2022-08-04 | End: 2022-08-04

## 2022-08-04 RX ADMIN — SODIUM CHLORIDE, PRESERVATIVE FREE 10 ML: 5 INJECTION INTRAVENOUS at 09:40

## 2022-08-04 RX ADMIN — WATER 2000 MG: 1 INJECTION INTRAMUSCULAR; INTRAVENOUS; SUBCUTANEOUS at 09:39

## 2022-08-04 RX ADMIN — POTASSIUM CHLORIDE 40 MEQ: 1500 TABLET, EXTENDED RELEASE ORAL at 09:38

## 2022-08-04 RX ADMIN — ENOXAPARIN SODIUM 30 MG: 100 INJECTION SUBCUTANEOUS at 09:39

## 2022-08-04 RX ADMIN — HYDROCHLOROTHIAZIDE 25 MG: 25 TABLET ORAL at 09:38

## 2022-08-04 RX ADMIN — LOSARTAN POTASSIUM 100 MG: 50 TABLET, FILM COATED ORAL at 09:38

## 2022-08-04 ASSESSMENT — PAIN SCALES - GENERAL: PAINLEVEL_OUTOF10: 0

## 2022-08-04 ASSESSMENT — PAIN SCALES - WONG BAKER: WONGBAKER_NUMERICALRESPONSE: 0

## 2022-08-04 NOTE — PROGRESS NOTES
CLINICAL PHARMACY NOTE: MEDS TO BEDS    Total # of Prescriptions Filled: 2   The following medications were delivered to the patient:  Atorvastatin calcium 40 mg  Cefdinir 300 mg  Picked up in the pharmacy    Additional Documentation:

## 2022-08-04 NOTE — PLAN OF CARE
Problem: Chronic Conditions and Co-morbidities  Goal: Patient's chronic conditions and co-morbidity symptoms are monitored and maintained or improved  8/4/2022 1135 by Cristy Doan RN  Outcome: Completed  8/4/2022 0240 by Margo Lopez RN  Outcome: Progressing     Problem: Discharge Planning  Goal: Discharge to home or other facility with appropriate resources  8/4/2022 1135 by Cristy Doan RN  Outcome: Completed  8/4/2022 0240 by Margo Lopez RN  Outcome: Progressing     Problem: Pain  Goal: Verbalizes/displays adequate comfort level or baseline comfort level  8/4/2022 1135 by Cristy Doan RN  Outcome: Completed  8/4/2022 0240 by Margo Lopez RN  Outcome: Progressing     Problem: Safety - Adult  Goal: Free from fall injury  8/4/2022 1135 by Cristy Doan RN  Outcome: Completed  8/4/2022 0240 by Margo Lopez RN  Outcome: Progressing     Problem: ABCDS Injury Assessment  Goal: Absence of physical injury  8/4/2022 1135 by Cristy Doan RN  Outcome: Completed  8/4/2022 0240 by Margo Lopez RN  Outcome: Progressing

## 2022-08-04 NOTE — PROGRESS NOTES
[address] [place] [address] [facility]  Progress Note      Chief Complaint   Patient presents with    Fever     Fever, chills, vomiting, vaginal pressure for the last few days. SUBJECTIVE:  Patient is tolerating medications. No reported adverse drug reactions. No nausea, vomiting, diarrhea. Chair tolerating p.o. doing well  she still has a white count elevation but trending down  No fever  No urinary complaints    Review of systems:  As stated above in the chief complaint, otherwise negative. Medications:  Scheduled Meds:   potassium chloride  40 mEq Oral Once    amLODIPine  10 mg Oral Nightly    atorvastatin  40 mg Oral Nightly    DULoxetine  60 mg Oral Nightly    insulin glargine-yfgn  25 Units SubCUTAneous Nightly    sodium chloride flush  5-40 mL IntraVENous 2 times per day    enoxaparin  30 mg SubCUTAneous BID    insulin lispro  0-16 Units SubCUTAneous TID WC    insulin lispro  0-4 Units SubCUTAneous Nightly    losartan  100 mg Oral Daily    And    hydroCHLOROthiazide  25 mg Oral Daily    miconazole   Topical BID     Continuous Infusions:   dextrose      sodium chloride       PRN Meds:HYDROcodone 5 mg - acetaminophen, glucose, dextrose bolus **OR** dextrose bolus, glucagon (rDNA), dextrose, sodium chloride flush, sodium chloride, magnesium hydroxide, ondansetron **OR** ondansetron, acetaminophen **OR** acetaminophen    OBJECTIVE:  BP (!) 141/89   Pulse (!) 103   Temp 97.3 °F (36.3 °C) (Temporal)   Resp 20   Ht 5' 5\" (1.651 m)   Wt 273 lb (123.8 kg)   LMP  (LMP Unknown)   SpO2 94%   BMI 45.43 kg/m²   Temp  Av.7 °F (36.5 °C)  Min: 97.2 °F (36.2 °C)  Max: 98.4 °F (36.9 °C)  Constitutional: The patient is awake, alert, and oriented. Skin: Warm and dry. No rashes were noted. HEENT: Round and reactive pupils. Moist mucous membranes. No ulcerations or thrush. Neck: Supple to movements. Chest: No use of accessory muscles to breathe. Symmetrical expansion.   No wheezing, crackles or rhonchi. Cardiovascular: S1 and S2 are rhythmic and regular. No murmurs appreciated. Abdomen: Positive bowel sounds to auscultation. Benign to palpation. No masses felt. No hepatosplenomegaly. Genitourinary: Female  Extremities: No clubbing, no cyanosis, no edema.   Lines: peripheral    Laboratory and Tests Review:  Lab Results   Component Value Date    WBC 12.4 (H) 08/04/2022    WBC 13.0 (H) 08/03/2022    WBC 14.7 (H) 08/02/2022    HGB 10.2 (L) 08/04/2022    HCT 30.5 (L) 08/04/2022    MCV 80.1 08/04/2022     08/04/2022     Lab Results   Component Value Date    NEUTROABS 16.96 (H) 08/01/2022    NEUTROABS 21.53 (H) 07/31/2022    NEUTROABS 6.96 02/09/2021     No results found for: [facility]  Lab Results   Component Value Date    ALT 24 07/31/2022    AST 22 07/31/2022    ALKPHOS 135 (H) 07/31/2022    BILITOT 0.9 07/31/2022     Lab Results   Component Value Date/Time     08/04/2022 04:14 AM    K 3.0 08/04/2022 04:14 AM    K 3.6 08/01/2022 06:00 AM     08/04/2022 04:14 AM    CO2 24 08/04/2022 04:14 AM    BUN 8 08/04/2022 04:14 AM    CREATININE 0.9 08/04/2022 04:14 AM    CREATININE 1.0 08/03/2022 04:13 AM    CREATININE 1.1 08/02/2022 04:19 AM    GFRAA >60 08/04/2022 04:14 AM    LABGLOM >60 08/04/2022 04:14 AM    GLUCOSE 147 08/04/2022 04:14 AM    PROT 7.6 07/31/2022 02:41 PM    LABALBU 3.6 07/31/2022 02:41 PM    CALCIUM 8.6 08/04/2022 04:14 AM    BILITOT 0.9 07/31/2022 02:41 PM    ALKPHOS 135 07/31/2022 02:41 PM    AST 22 07/31/2022 02:41 PM    ALT 24 07/31/2022 02:41 PM     Lab Results   Component Value Date    CRP 0.3 03/19/2019     No results found for: [address]  Radiology:      Microbiology:   Lab Results   Component Value Date/Time    [facility]  07/31/2022 02:00 PM     Gram stain performed from blood culture bottle media  Gram negative rods      BC Sensitivity to follow 07/31/2022 02:00 PM    ORG Escherichia coli 07/31/2022 02:41 PM    ORG Escherichia coli 07/31/2022 02:41 PM    ORG Escherichia coli 07/31/2022 02:00 PM     Lab Results   Component Value Date/Time    BLOODCULT2  07/31/2022 02:41 PM     Gram stain performed from blood culture bottle media  Gram negative rods  Previously positive blood culture called      BLOODCULT2  07/31/2022 02:41 PM     Refer to previous culture for susceptibility results  of CXBL collected 07/31/22 at 14:00      ORG Escherichia coli 07/31/2022 02:41 PM    ORG Escherichia coli 07/31/2022 02:41 PM    ORG Escherichia coli 07/31/2022 02:00 PM     WOUND/ABSCESS   Date Value Ref Range Status   03/18/2019 Moderate growth  Final     No results found for: RESPSMEAR  No results found for: MPNEUMO, CLAMYDCU, LABLEGI, AFBCX, FUNGSM, LABFUNG  No results found for: CULTRESP  No results found for: CXCATHTIP  No results found for: BFCS  No results found for: CXSURG  Urine Culture, Routine   Date Value Ref Range Status   07/31/2022 >100,000 CFU/ml  Final     MRSA Culture Only   Date Value Ref Range Status   03/18/2019 Methicillin resistant Staph aureus not isolated  Final     Microbiology:  7/31/2022 blood cultures 2 out of 2 sets E. Coli  7/31/2022 urine E. coli    ASSESSMENT:  D UTI with pyelonephritis and E. coli septicemia and bacteremia    PLAN:  Stop ceftriaxone today with a final dose in the a.m. and subsequently discharged on p.o. Scrip in chart-ok for discharge  Wbc  trending down  Check final cultures  Monitor labs    Anthony Deshpande, APRN - CNP  9:52 AM  8/4/2022    Pt seen and examined. Above discussed agree with advanced practice nurse. Labs, cultures, and radiographs reviewed. Face to Face encounter occurred. Changes made as necessary.      Inge uLna MD

## 2022-08-04 NOTE — CARE COORDINATION
Per ID, patient to receive final dose of ceftriaxone this morning and subsequently be discharged on p.o. K is 3 today; being replaced. Plan remains home, patient with no needs and  to transport.     Benjamin Rose, MSW, LSW [phone]

## 2022-08-05 LAB — ORGANISM: ABNORMAL

## 2022-08-06 LAB
CULTURE, BLOOD 2: ABNORMAL
CULTURE, BLOOD 2: ABNORMAL
ORGANISM: ABNORMAL

## 2022-08-25 NOTE — DISCHARGE SUMMARY
[facility]   Discharge summary   Patient ID:  August Durbin  [id number]  13 y.o.  1970    Admit date: 7/31/2022    Discharge date and time: 8/4/2022    Admission Diagnoses:   Patient Active Problem List   Diagnosis    Major depressive disorder, recurrent episode, moderate ([address]    Cellulitis    Type 2 diabetes mellitus with ketoacidosis without coma, without long-term current use of insulin (HCC)    Chest pain    Chronic pain of right ankle    Chronic pain in right foot    Neuralgia and neuritis    Complex regional pain syndrome type 2 of right lower extremity    UTI (urinary tract infection)       Discharge Diagnoses: UTI/pyelonephritis    Consults: ID    Procedures: none    Hospital Course: The patient is a 45 y.o. female of David Mask, DO     45year old morbidly obese female with a past medical history of basal cell carcinoma, and hypertension is admitted to telemetry unit with     UTI/pyelonephritis  Monitor labs-WBC 19>14.7>13  Strict I's and O's  IV hydration - NS @ 125 - discontinued  IV antibiotics in the form of Rocephin 2 g every 24 hours  Positive blood cultures Enterobacteriaceae and E. coli-+ from 7/31/2022  Consult ID- increased Rocephin to 2 gm daily - appreciate input  Supplement potassium  today 3.3 supplemented     Diabetes Mellitus  -Monitor labs  -ISS glucose control  -Hypoglycemia protocol initiated       No results for input(s): WBC, HGB, HCT, PLT in the last 72 hours. No results for input(s): NA, K, CL, CO2, BUN, CREATININE, GLU, CALCIUM in the last 72 hours. CT ABDOMEN PELVIS WO CONTRAST Additional Contrast? None    Result Date: 7/31/2022  EXAMINATION: CT OF THE ABDOMEN AND PELVIS WITHOUT CONTRAST 7/31/2022 4:40 pm TECHNIQUE: CT of the abdomen and pelvis was performed without the administration of intravenous contrast. Multiplanar reformatted images are provided for review.  Automated exposure control, iterative reconstruction, and/or weight based adjustment of the mA/kV was utilized to reduce the radiation dose to as low as reasonably achievable. COMPARISON: None. HISTORY: ORDERING SYSTEM PROVIDED HISTORY: Abdominal pain, UTI, fever, acute kidney injury TECHNOLOGIST PROVIDED HISTORY: Reason for exam:->Abdominal pain, UTI, fever, acute kidney injury Additional Contrast?->None Decision Support Exception - unselect if not a suspected or confirmed emergency medical condition->Emergency Medical Condition (MA) What reading provider will be dictating this exam?->CRC FINDINGS: Lower Chest: Minimal atelectasis or scarring in the lower lobes. The heart is normal in size. No pleural or pericardial effusion. Organs: Liver: Unremarkable. Gallbladder: Cholelithiasis. No pericholecystic inflammatory changes or ductal dilatation. Pancreas: Unremarkable. Spleen:  Unremarkable. Adrenals: Unremarkable. Kidneys: Normal in size and contour without stone or hydronephrosis. Perinephric edema is seen. GI/Bowel: No bowel wall thickening or obstruction. Normal appendix. Pelvis: The urinary bladder is unremarkable. Within limits of the CT technique, the uterus and the adnexa are grossly unremarkable. Peritoneum/Retroperitoneum: Moderate calcified atherosclerosis is seen in the aorta. No aneurysm. No lymphadenopathy. No free air or free fluid is seen. Bones/Soft Tissues: The visualized bones are intact without fracture or focal lesion. 1. Nonspecific perinephric edema, which given the history (UTI, fever, acute kidney injury) is likely due to an acute medical renal disease (renal failure or pyelonephritis). If indicated, contrast enhanced CT would be more sensitive for pyelonephritis. 2. No urolithiasis or hydronephrosis. 3. Cholelithiasis. 4. Moderate calcified atherosclerosis in the abdominal aorta, the extent of which is somewhat greater than is typical for age. No aneurysm. Discharge Exam:    HEENT: NCAT,  PERRLA, No JVD  Heart:  RRR, no murmurs, gallops, or rubs.   Lungs: CTA bilaterally, no wheeze, rales or rhonchi  Abd: bowel sounds present, nontender, nondistended, no masses  Extrem:  No clubbing, cyanosis, or edema    Disposition: home     Patient Condition at Discharge: stable    Patient Instructions:      Medication List        CHANGE how you take these medications      atorvastatin 40 MG tablet  Commonly known as: LIPITOR  Take 1 tablet by mouth nightly  What changed: how much to take     insulin glargine 100 UNIT/ML injection pen  Commonly known as: Lantus SoloStar  Inject 25 Units into the skin nightly  What changed: how much to take     insulin lispro 100 UNIT/ML pen  Commonly known as: HumaLOG KwikPen  Inject 4 Units into the skin 3 times daily (before meals)  What changed: how much to take            CONTINUE taking these medications      amLODIPine 10 MG tablet  Commonly known as: NORVASC     DULoxetine 60 MG extended release capsule  Commonly known as: CYMBALTA  Take 1 capsule by mouth nightly     fluticasone 50 MCG/ACT nasal spray  Commonly known as: FLONASE  1 spray by Each Nare route daily     * Insulin Pen Needle 31G X 6 MM Misc  1 each by Does not apply route daily     * B-D UF III MINI PEN NEEDLES 31G X 5 MM Misc  Generic drug: Insulin Pen Needle     Lancets Misc  1 each by Does not apply route daily     losartan-hydroCHLOROthiazide 100-25 MG per tablet  Commonly known as: HYZAAR     Ozempic (0.25 or 0.5 MG/DOSE) 2 MG/1.5ML Sopn  Generic drug: Semaglutide(0.25 or 0.5MG/DOS)     vitamin D 1.25 MG (53990 UT) Caps capsule  Commonly known as: ERGOCALCIFEROL           * This list has 2 medication(s) that are the same as other medications prescribed for you. Read the directions carefully, and ask your doctor or other care provider to review them with you. ASK your doctor about these medications      cefdinir 300 MG capsule  Commonly known as: OMNICEF  Take 1 capsule by mouth in the morning and 1 capsule before bedtime. Do all this for 9 days.   Ask about: Should I take this medication? Where to Get Your Medications        These medications were sent to [facility] [address]      Phone: [phone]   atorvastatin 40 MG tablet       You can get these medications from any pharmacy    Bring a paper prescription for each of these medications  cefdinir 300 MG capsule       Activity: activity as tolerated  Diet: cardiac diet    Pt has been advised to: Follow-up with KENNY ELIZABETH III, DO in 1 week. Follow-up with consultants as recommended by them    Note that over 30 minutes was spent in preparing discharge papers, discussing discharge with patient, medication review, etc.    Signed:  RONI Metz CNP  8/4/2022    Above note edited to reflect my thoughts     I personally saw, examined and provided care for the patient. Radiographs, labs and medication list were reviewed by me independently. The case was discussed in detail and plans for care were established. Review of Salma AUSTIN CNP, documentation was conducted and revisions were made as appropriate directly by me. I agree with the above documented exam, problem list, and plan of care.      Kaushik Perez MD  8/4/2022

## 2022-08-30 PROBLEM — N39.0 UTI (URINARY TRACT INFECTION): Status: RESOLVED | Noted: 2022-07-31 | Resolved: 2022-08-30

## 2022-12-07 ENCOUNTER — HOSPITAL ENCOUNTER (OUTPATIENT)
Age: 52
Discharge: HOME | End: 2022-12-09

## 2022-12-08 ENCOUNTER — HOSPITAL ENCOUNTER (OUTPATIENT)
Age: 52
Discharge: HOME | End: 2022-12-10

## 2022-12-08 LAB
ANION GAP SERPL CALCULATED.3IONS-SCNC: 11 MMOL/L (ref 7–16)
BUN BLDV-MCNC: 11 MG/DL (ref 6–20)
CALCIUM SERPL-MCNC: 8.6 MG/DL (ref 8.6–10.2)
CHLORIDE BLD-SCNC: 106 MMOL/L (ref 98–107)
CO2: 23 MMOL/L (ref 22–29)
CREAT SERPL-MCNC: 0.9 MG/DL (ref 0.5–1)
GFR SERPL CREATININE-BSD FRML MDRD: >60 ML/MIN/1.73
GLUCOSE BLD-MCNC: 93 MG/DL (ref 74–99)
HCT VFR BLD CALC: 34 % (ref 34–48)
HEMOGLOBIN: 11 G/DL (ref 11.5–15.5)
MCH RBC QN AUTO: 27.6 PG (ref 26–35)
MCHC RBC AUTO-ENTMCNC: 32.4 % (ref 32–34.5)
MCV RBC AUTO: 85.4 FL (ref 80–99.9)
PDW BLD-RTO: 14.2 FL (ref 11.5–15)
PLATELET # BLD: 249 E9/L (ref 130–450)
PMV BLD AUTO: 10 FL (ref 7–12)
POTASSIUM SERPL-SCNC: 3.8 MMOL/L (ref 3.5–5)
RBC # BLD: 3.98 E12/L (ref 3.5–5.5)
SODIUM BLD-SCNC: 140 MMOL/L (ref 132–146)
WBC # BLD: 10.8 E9/L (ref 4.5–11.5)

## 2022-12-08 PROCEDURE — 80048 BASIC METABOLIC PNL TOTAL CA: CPT

## 2022-12-08 PROCEDURE — 85027 COMPLETE CBC AUTOMATED: CPT

## 2023-01-23 ENCOUNTER — OFFICE VISIT (OUTPATIENT)
Dept: PAIN MANAGEMENT | Age: 53
End: 2023-01-23
Payer: MEDICARE

## 2023-01-23 VITALS
TEMPERATURE: 98.2 F | RESPIRATION RATE: 16 BRPM | HEART RATE: 93 BPM | HEIGHT: 65 IN | SYSTOLIC BLOOD PRESSURE: 135 MMHG | DIASTOLIC BLOOD PRESSURE: 64 MMHG | OXYGEN SATURATION: 97 % | WEIGHT: 270 LBS | BODY MASS INDEX: 44.98 KG/M2

## 2023-01-23 DIAGNOSIS — M25.571 CHRONIC PAIN OF RIGHT ANKLE: ICD-10-CM

## 2023-01-23 DIAGNOSIS — M79.671 CHRONIC PAIN IN RIGHT FOOT: ICD-10-CM

## 2023-01-23 DIAGNOSIS — G89.29 CHRONIC PAIN IN RIGHT FOOT: ICD-10-CM

## 2023-01-23 DIAGNOSIS — G89.29 CHRONIC PAIN OF RIGHT ANKLE: ICD-10-CM

## 2023-01-23 DIAGNOSIS — G57.71 COMPLEX REGIONAL PAIN SYNDROME TYPE 2 OF RIGHT LOWER EXTREMITY: ICD-10-CM

## 2023-01-23 DIAGNOSIS — M79.2 NEURALGIA AND NEURITIS: Primary | ICD-10-CM

## 2023-01-23 PROCEDURE — G8484 FLU IMMUNIZE NO ADMIN: HCPCS | Performed by: ANESTHESIOLOGY

## 2023-01-23 PROCEDURE — 99213 OFFICE O/P EST LOW 20 MIN: CPT | Performed by: ANESTHESIOLOGY

## 2023-01-23 PROCEDURE — 1036F TOBACCO NON-USER: CPT | Performed by: ANESTHESIOLOGY

## 2023-01-23 PROCEDURE — 3017F COLORECTAL CA SCREEN DOC REV: CPT | Performed by: ANESTHESIOLOGY

## 2023-01-23 PROCEDURE — G8417 CALC BMI ABV UP PARAM F/U: HCPCS | Performed by: ANESTHESIOLOGY

## 2023-01-23 PROCEDURE — 99214 OFFICE O/P EST MOD 30 MIN: CPT | Performed by: ANESTHESIOLOGY

## 2023-01-23 PROCEDURE — G8427 DOCREV CUR MEDS BY ELIG CLIN: HCPCS | Performed by: ANESTHESIOLOGY

## 2023-01-23 RX ORDER — AMITRIPTYLINE HYDROCHLORIDE 25 MG/1
25 TABLET, FILM COATED ORAL NIGHTLY
Qty: 30 TABLET | Refills: 2 | Status: SHIPPED | OUTPATIENT
Start: 2023-01-23

## 2023-01-23 NOTE — PROGRESS NOTES
[facility]   [place], [address]  Dept: [phone]      Follow up Note      Tiago Rosen     Date of Visit:  1/23/2023    CC:  Patient presents for follow up   Chief Complaint   Patient presents with    Follow-up     Right leg pain        HPI:  H/o fall in Feb 2018- and had ankle injury including tendon injury. Underwent surgery for the same. Developed neuroma and subsequently had surgeries for neuroma removal. Complicated by infections, I & D. Multiple modalities of treatment including- Medications, injections, PT. Pain over the right lateral ankle area, foot- mainly over the lateral 3 toes. Has numbness over the right lateral foot and lateral 3 toes. Tingling. Numbness. Burning. Intermittent color changes and swelling +. S/P Lumbar sympathetic block / TFESI/ SNRB- minimal benefit. Prior HF trial- no improvement. Nursing notes and details of the pain history reviewed. Please see intake notes for details. Previous treatments:   Physical Therapy : yes, continues HEP      Medications: - NSAID's : yes                        - Membrane stabilizers : yes - Gabapentin- did not help, Lyrica.                       - Opioids : no chronic use. - Adjuvants or Others : yes - Cymbalta. Local compound cream.     TENS Unit: yes,     Surgeries: Yes- ankle surgery/ neuroma excision, I & D etc.     Interventions; Yes     She has not been on anticoagulation medications no. She has not been on herbal supplements. She is diabetic. Employment: disability     Imaging:   MRI of right ankle: 12/2019:    IMPRESSION:  Residual osteochondral injury to the medial talar dome, associated   with the presumed postsurgical change in the neck of the talus. Thickening of the anterior talofibular ligament also likely reflects   prior surgical repair. Tendinous and ligament structures are   otherwise grossly intact. No enhancing lesions.      MRI ANKLE WO IVCON RT3/23/2018  [facility]  Result Impression   IMPRESSION:    1. SEVERE LATERAL ANKLE SPRAIN WITH INJURIES OF THE ANTERIOR TALOFIBULAR   AND CALCANEOFIBULAR LIGAMENTS. THERE IS A BONE CONTUSION OF THE MEDIAL   ASPECT OF THE TALAR DOME. 2.  BONE CONTUSION OF THE CUBOID BONE. 3.  RIGHT ANKLE EFFUSION. Xray of the right ankle: 2/2018: Impression   A tiny bony fragment from the medial malleolus which could represent a   small avulsion injury. No other displaced fractures are identified. Soft tissue swelling                                            Potential Aberrant Drug-Related Behavior:no       Urine Drug Screening:no     OARRS report[de-identified]  Yes- reviewed: today                                          Past Medical History:   Diagnosis Date    Cancer ([facility] [address] 2012    basal cell scalp    Diabetes mellitus ([facility] [address]     Hyperlipidemia     Hypertension     Nerve pain     right foot    Obese     Seasonal allergies        Past Surgical History:   Procedure Laterality Date    ANESTHESIA NERVE BLOCK Right 6/18/2020    RIGHT LUMBAR SYMPATHETIC BLOCK UNDER FLUORO performed by Amrita Frazier MD at [address]  years ago    removal cysts    NEUROMA SURGERY Right     done x 3 / last one 2/2019    PAIN MANAGEMENT PROCEDURE N/A 9/2/2021    SPINAL CORD STIMULATOR TRIAL WITH Veto Ambrosio  (CPT 90374) performed by Amrita Frazier MD at [address] Right 12/2/2021    LUMBAR TRANSFORAMINAL EPIDURAL STEROID INJECTION RIGHT L5 AND S1 UNDER FLUOROSCOPIC GUIDANCE performed by Amrita Frazier MD at [address] N/A 6/2/2022    SPINAL CORD STIMULATOR TRIAL WITH [employer] performed by Amrita Frazier MD at [facility] Left     rotator cuff done x 3 / last one 2012    SKIN CANCER EXCISION  2012    basal cell ca scalp excision       Prior to Admission medications    Medication Sig Start Date End Date Taking?  Authorizing Provider atorvastatin (LIPITOR) 40 MG tablet Take 1 tablet by mouth nightly 22  Yes RONI Willson - CNP   amLODIPine (NORVASC) 10 MG tablet Take 10 mg by mouth at bedtime  3/11/22  Yes Historical Provider, MD   losartan-hydroCHLOROthiazide (HYZAAR) 100-25 MG per tablet TAKE 1 TABLET BY MOUTH EVERY DAY 22  Yes Historical Provider, MD STODDARD UF III MINI PEN NEEDLES 31G X 5 MM MISC USE 4 TIMES A DAY 22  Yes Historical Provider, MD   vitamin D (ERGOCALCIFEROL) 1.25 MG (59825 UT) CAPS capsule Take 50,000 Units by mouth once a week   Yes Historical Provider, MD   Semaglutide,0.25 or 0.5MG/DOS, (OZEMPIC, 0.25 OR 0.5 MG/DOSE,) 2 MG/1.5ML SOPN Inject 0.5 mg into the skin once a week Takes every Monday   Yes Historical Provider, MD   insulin glargine (LANTUS SOLOSTAR) 100 UNIT/ML injection pen Inject 25 Units into the skin nightly 3/22/19  Yes Stu Norman MD   insulin lispro (HUMALOG KWIKPEN) 100 UNIT/ML pen Inject 4 Units into the skin 3 times daily (before meals) 3/22/19  Yes Stu Norman MD   fluticasone (FLONASE) 50 MCG/ACT nasal spray 1 spray by Each Nare route daily 3/23/19  Yes Stu Norman MD   Lancets MISC 1 each by Does not apply route daily 3/20/19  Yes Stu Norman MD   Insulin Pen Needle 31G X 6 MM MISC 1 each by Does not apply route daily 3/20/19  Yes Stu Norman MD   DULoxetine (CYMBALTA) 60 MG extended release capsule Take 1 capsule by mouth nightly  Patient not taking: Reported on 2023 MD Martin       No Known Allergies    Social History     Socioeconomic History    Marital status:      Spouse name: Not on file    Number of children: Not on file    Years of education: Not on file    Highest education level: Not on file   Occupational History    Not on file   Tobacco Use    Smoking status: Former     Packs/day: 1.00     Years: 12.00     Pack years: 12.00     Types: Cigarettes     Quit date: 6/15/2005     Years since quittin.6    Smokeless tobacco: Never Vaping Use    Vaping Use: Never used   Substance and Sexual Activity    Alcohol use: Yes     Comment: rarely    Drug use: Never    Sexual activity: Not on file   Other Topics Concern    Not on file   Social History Narrative    Not on file     Social Determinants of Health     Financial Resource Strain: Not on file   Food Insecurity: Not on file   Transportation Needs: Not on file   Physical Activity: Not on file   Stress: Not on file   Social Connections: Not on file   Intimate Partner Violence: Not on file   Housing Stability: Not on file       Family History   Problem Relation Age of Onset    Heart Attack Mother     Heart Attack Father 48    Heart Attack Maternal Grandmother     Heart Attack Other     Mult Sclerosis Sister        REVIEW OF SYSTEMS:     Tyra Merida denies fever/chills, chest pain, shortness of breath, new bowel or bladder complaints. All other review of systems was negative. PHYSICAL EXAMINATION:      /64   Pulse 93   Temp 98.2 °F (36.8 °C) (Infrared)   Resp 16   Ht 5' 5\" (1.651 m)   Wt 270 lb (122.5 kg)   LMP  (LMP Unknown)   SpO2 97%   BMI 44.93 kg/m²   General:       General appearance:  Pleasant and well-hydrated, in no distress and A & O x 3  Build:Obese  Function: Rises from seated position easily and Moves about room with difficulty     HEENT:     Head:normocephalic, atraumatic   Lungs:     Breathing:normal breathing pattern     Abdomen:     Shape:non-distended and normal     Cervical spine:     Inspection:normal     Thoracic spine:                Range of motion:normal in flexion, extension rotation bilateral and is not painful. Lumbar spine:     Range of motion: Decreased, flexion Decreased, Lateral bending, extension and rotation bilaterally reduced is not painful.      Musculoskeletal:     Shoulder ROM -appears intact   Extremities:     Tremors:None bilaterally upper and lower     Right ankle/ foot:  Scar form the prior surgery - healed well  Ankle ROM reduced  Some swelling noted   Dysesthesia/ allodynia + right distal LE. Assessment/Plan:    Diagnosis Orders   1. Chronic pain of right ankle      2. Chronic pain in right foot      3. Neuralgia and neuritis      4. Complex regional pain syndrome type 2 of right lower extremity      H/o fall in 2018 following which she had right ankle injury / tendon injury. S/P surgery for the same. Had developed neuroma and subsequently had surgeries for neuroma treatment- complicated by infection for which she had undergone I & D. Having pain mainly over the right lateral ankle, foot, lateral three toes and numbness. Intermittent swelling and color changes +. Failed multiple modalities of treatment including meds/ PT/ local injection, Lumbar sympathetic block/ SNRB etc.    Features of neuropathic pain/ CRPS -type 2. S/P SCS trial - minimal relief. Compound cream for local use. TENS unit- detailed discussion about proper use- demonstrated it. Elavil- E scribed. Consider adding Gabapentin. Consider PNS Vs ITP in future. Counseling : reg regular HEP and weight loss.     Amrita Frazier MD    CC:    Jeremie King DPM  [id number] [address]  [place] [address]     [address] [facility]

## 2023-01-23 NOTE — PROGRESS NOTES
Do you currently have any of the following:    Fever: No  Headache:  No  Cough: No  Shortness of breath: No  Exposed to anyone with these symptoms: No         Brnua Coburn presents to the [address] [place] [address] on 1/23/2023. Patti Phelps is complaining of pain in her right leg and foot. The pain is constant. The pain is described as aching, dull, sharp, and miserable. Pain is rated on her best day at a 5, on her worst day at a 10, and on average at a 7 on the VAS scale. Any procedures since your last visit: No    Pacemaker or defibrillator: No     She is not on NSAIDS and is not on anticoagulation medications. Medication Contract and Consent for Opioid Use Documents Filed        No documents found                    /64   Pulse 93   Temp 98.2 °F (36.8 °C) (Infrared)   Resp 16   Ht 5' 5\" (1.651 m)   Wt 270 lb (122.5 kg)   LMP  (LMP Unknown)   SpO2 97%   BMI 44.93 kg/m²      No LMP recorded (lmp unknown).  Patient is postmenopausal.

## 2023-03-23 ENCOUNTER — OFFICE VISIT (OUTPATIENT)
Dept: PAIN MANAGEMENT | Age: 53
End: 2023-03-23
Payer: MEDICARE

## 2023-03-23 VITALS
SYSTOLIC BLOOD PRESSURE: 106 MMHG | BODY MASS INDEX: 43.32 KG/M2 | HEIGHT: 65 IN | OXYGEN SATURATION: 96 % | WEIGHT: 260 LBS | TEMPERATURE: 97.6 F | HEART RATE: 91 BPM | RESPIRATION RATE: 16 BRPM | DIASTOLIC BLOOD PRESSURE: 70 MMHG

## 2023-03-23 DIAGNOSIS — G57.71 COMPLEX REGIONAL PAIN SYNDROME TYPE 2 OF RIGHT LOWER EXTREMITY: ICD-10-CM

## 2023-03-23 DIAGNOSIS — G89.29 CHRONIC PAIN IN RIGHT FOOT: ICD-10-CM

## 2023-03-23 DIAGNOSIS — G89.29 CHRONIC PAIN OF RIGHT ANKLE: Primary | ICD-10-CM

## 2023-03-23 DIAGNOSIS — M25.571 CHRONIC PAIN OF RIGHT ANKLE: Primary | ICD-10-CM

## 2023-03-23 DIAGNOSIS — M79.671 CHRONIC PAIN IN RIGHT FOOT: ICD-10-CM

## 2023-03-23 DIAGNOSIS — M79.2 NEURALGIA AND NEURITIS: ICD-10-CM

## 2023-03-23 PROCEDURE — G8484 FLU IMMUNIZE NO ADMIN: HCPCS | Performed by: ANESTHESIOLOGY

## 2023-03-23 PROCEDURE — 3017F COLORECTAL CA SCREEN DOC REV: CPT | Performed by: ANESTHESIOLOGY

## 2023-03-23 PROCEDURE — 99213 OFFICE O/P EST LOW 20 MIN: CPT | Performed by: ANESTHESIOLOGY

## 2023-03-23 PROCEDURE — G8417 CALC BMI ABV UP PARAM F/U: HCPCS | Performed by: ANESTHESIOLOGY

## 2023-03-23 PROCEDURE — 99214 OFFICE O/P EST MOD 30 MIN: CPT | Performed by: ANESTHESIOLOGY

## 2023-03-23 PROCEDURE — G8427 DOCREV CUR MEDS BY ELIG CLIN: HCPCS | Performed by: ANESTHESIOLOGY

## 2023-03-23 PROCEDURE — 1036F TOBACCO NON-USER: CPT | Performed by: ANESTHESIOLOGY

## 2023-03-23 RX ORDER — MELOXICAM 15 MG/1
TABLET ORAL
COMMUNITY
Start: 2023-03-20

## 2023-03-23 RX ORDER — AMITRIPTYLINE HYDROCHLORIDE 50 MG/1
50 TABLET, FILM COATED ORAL NIGHTLY
Qty: 30 TABLET | Refills: 2 | Status: SHIPPED | OUTPATIENT
Start: 2023-03-23

## 2023-03-23 NOTE — PROGRESS NOTES
Do you currently have any of the following:    Fever: No  Headache:  No  Cough: No  Shortness of breath: No  Exposed to anyone with these symptoms: No         Chris Conti presents to the [address] [place] [address] on 3/23/2023. Monica Alonso is complaining of pain right ankle and right foot. The pain is constant. The pain is described as aching. Pain is rated on her best day at a 6, on her worst day at a 10, and on average at a 5 on the VAS scale. She took her last dose of  mobic  last night. Any procedures since your last visit: No  Pacemaker or defibrillator: No managed by . She is  on NSAIDS and is not on anticoagulation medication. Medication Contract and Consent for Opioid Use Documents Filed        No documents found                    /70   Pulse 91   Temp 97.6 °F (36.4 °C) (Infrared)   Resp 16   Ht 5' 5\" (1.651 m)   Wt 260 lb (117.9 kg)   LMP  (LMP Unknown)   SpO2 96%   BMI 43.27 kg/m²      No LMP recorded (lmp unknown).  Patient is postmenopausal.
Date Taking?  Authorizing Provider   meloxicam (MOBIC) 15 MG tablet  3/20/23  Yes Historical Provider, MD   amitriptyline (ELAVIL) 25 MG tablet Take 1 tablet by mouth nightly 23  Yes Elizabeth Gavlan MD   atorvastatin (LIPITOR) 40 MG tablet Take 1 tablet by mouth nightly 22  Yes Dany May, APRN - CNP   losartan-hydroCHLOROthiazide (HYZAAR) 100-25 MG per tablet TAKE 1 TABLET BY MOUTH EVERY DAY 22  Yes Historical Provider, MD STODDARD UF III MINI PEN NEEDLES 31G X 5 MM MISC USE 4 TIMES A DAY 22  Yes Historical Provider, MD   vitamin D (ERGOCALCIFEROL) 1.25 MG (65817 UT) CAPS capsule Take 50,000 Units by mouth once a week   Yes Historical Provider, MD   Semaglutide,0.25 or 0.5MG/DOS, (OZEMPIC, 0.25 OR 0.5 MG/DOSE,) 2 MG/1.5ML SOPN Inject 0.5 mg into the skin once a week Takes every Monday   Yes Historical Provider, MD   insulin glargine (LANTUS SOLOSTAR) 100 UNIT/ML injection pen Inject 25 Units into the skin nightly 3/22/19  Yes Jewell Harrell MD   fluticasone (FLONASE) 50 MCG/ACT nasal spray 1 spray by Each Nare route daily 3/23/19  Yes Jewell Harrell MD   Lancets MISC 1 each by Does not apply route daily 3/20/19  Yes Jewell Harrell MD   Insulin Pen Needle 31G X 6 MM MISC 1 each by Does not apply route daily 3/20/19  Yes Jewell Harrell MD       No Known Allergies    Social History     Socioeconomic History    Marital status:      Spouse name: Not on file    Number of children: Not on file    Years of education: Not on file    Highest education level: Not on file   Occupational History    Not on file   Tobacco Use    Smoking status: Former     Packs/day: 1.00     Years: 12.00     Pack years: 12.00     Types: Cigarettes     Quit date: 6/15/2005     Years since quittin.7    Smokeless tobacco: Never   Vaping Use    Vaping Use: Never used   Substance and Sexual Activity    Alcohol use: Yes     Comment: rarely    Drug use: Never    Sexual activity: Not on file   Other Topics Concern    Not on

## 2023-04-27 RX ORDER — AMITRIPTYLINE HYDROCHLORIDE 50 MG/1
50 TABLET, FILM COATED ORAL NIGHTLY
Qty: 30 TABLET | Refills: 2 | OUTPATIENT
Start: 2023-04-27

## 2024-01-30 ENCOUNTER — OFFICE VISIT (OUTPATIENT)
Dept: PAIN MANAGEMENT | Age: 54
End: 2024-01-30
Payer: MEDICARE

## 2024-01-30 VITALS
SYSTOLIC BLOOD PRESSURE: 132 MMHG | TEMPERATURE: 96.9 F | OXYGEN SATURATION: 97 % | HEART RATE: 98 BPM | BODY MASS INDEX: 43.31 KG/M2 | HEIGHT: 65 IN | WEIGHT: 259.92 LBS | DIASTOLIC BLOOD PRESSURE: 82 MMHG | RESPIRATION RATE: 18 BRPM

## 2024-01-30 DIAGNOSIS — E66.9 OBESITY, UNSPECIFIED CLASSIFICATION, UNSPECIFIED OBESITY TYPE, UNSPECIFIED WHETHER SERIOUS COMORBIDITY PRESENT: ICD-10-CM

## 2024-01-30 DIAGNOSIS — G89.29 CHRONIC PAIN IN RIGHT FOOT: ICD-10-CM

## 2024-01-30 DIAGNOSIS — G57.71 COMPLEX REGIONAL PAIN SYNDROME TYPE 2 OF RIGHT LOWER EXTREMITY: ICD-10-CM

## 2024-01-30 DIAGNOSIS — M79.671 CHRONIC PAIN IN RIGHT FOOT: ICD-10-CM

## 2024-01-30 DIAGNOSIS — M79.2 NEURALGIA AND NEURITIS: Primary | ICD-10-CM

## 2024-01-30 PROCEDURE — 99213 OFFICE O/P EST LOW 20 MIN: CPT | Performed by: ANESTHESIOLOGY

## 2024-01-30 PROCEDURE — 99214 OFFICE O/P EST MOD 30 MIN: CPT | Performed by: ANESTHESIOLOGY

## 2024-01-30 RX ORDER — AMLODIPINE BESYLATE 10 MG/1
10 TABLET ORAL DAILY
COMMUNITY
Start: 2023-12-07

## 2024-01-30 RX ORDER — GABAPENTIN 300 MG/1
CAPSULE ORAL
Qty: 90 CAPSULE | Refills: 2 | Status: SHIPPED | OUTPATIENT
Start: 2024-01-30 | End: 2024-02-29

## 2024-01-30 RX ORDER — AMITRIPTYLINE HYDROCHLORIDE 50 MG/1
50 TABLET, FILM COATED ORAL NIGHTLY
Qty: 30 TABLET | Refills: 2 | Status: SHIPPED | OUTPATIENT
Start: 2024-01-30

## 2024-01-30 NOTE — PROGRESS NOTES
[facility]   [place], [place] [address]  Dept: [phone]      Follow up Note      Yancy Rossi     Date of Visit:  1/30/2024    CC:  Patient presents for follow up   Chief Complaint   Patient presents with    Follow-up     Right foot/ankle pain       HPI:  H/o fall in Feb 2018- and had ankle injury including tendon injury. Underwent surgery for the same.     Developed neuroma and subsequently had surgeries for neuroma removal. Complicated by infections, I & D.     Multiple modalities of treatment including- Medications, injections, PT.     Pain over the right lateral ankle area, foot- mainly over the lateral 3 toes.     Has numbness over the right lateral foot and lateral 3 toes. Tingling. Numbness. Burning. Intermittent color changes and swelling +.     S/P Lumbar sympathetic block / TFESI/ SNRB- minimal benefit.    Prior SCS trial- no improvement.    Nursing notes and details of the pain history reviewed. Please see intake notes for details.    Previous treatments:   Physical Therapy : yes, continues HEP      Medications: - NSAID's : yes                        - Membrane stabilizers : yes - Gabapentin- Lyrica.                       - Opioids : no chronic use.                       - Adjuvants or Others : yes - Cymbalta.  Local compound cream.     TENS Unit: yes,     Surgeries: Yes- ankle surgery/ neuroma excision, I & D etc.     Interventions; Yes     She has not been on anticoagulation medications no.     She has not been on herbal supplements.       She is diabetic.      Employment: disability     Imaging:   MRI of right ankle: 12/2019:    IMPRESSION:  Residual osteochondral injury to the medial talar dome, associated   with the presumed postsurgical change in the neck of the talus.   Thickening of the anterior talofibular ligament also likely reflects   prior surgical repair. Tendinous and ligament structures are   otherwise grossly intact. No enhancing lesions.     MRI ANKLE WO IVCON

## 2024-01-30 NOTE — PROGRESS NOTES
Yancy Rossi presents to the [facility] on 1/30/2024. Yancy is complaining of pain right foot and ankle. The pain is constant. The pain is described as aching, sharp, and burning. Pain is rated on her best day at a 5, on her worst day at a 10, and on average at a 7 on the VAS scale. She took her last dose of Motrin yesterday.    Any procedures since your last visit: No.    She is  on NSAIDS and  is not on anticoagulation medications to include none.     Pacemaker or defibrillator: No.    Medication Contract and Consent for Opioid Use Documents Filed        No documents found                       /82   Pulse 98   Temp 96.9 °F (36.1 °C) (Infrared)   Resp 18   Ht 1.651 m (5' 5\")   Wt 117.9 kg (259 lb 14.8 oz)   LMP  (LMP Unknown)   SpO2 97%   BMI 43.25 kg/m²      No LMP recorded (lmp unknown). Patient is postmenopausal.

## 2024-04-25 ENCOUNTER — HOSPITAL ENCOUNTER (OUTPATIENT)
Age: 54
Discharge: HOME | End: 2024-04-27

## 2024-04-29 LAB — SURGICAL PATHOLOGY REPORT: NORMAL

## 2024-12-19 ENCOUNTER — HOSPITAL ENCOUNTER (OUTPATIENT)
Dept: CT IMAGING | Age: 54
Discharge: HOME | End: 2024-12-21
Payer: MEDICARE

## 2024-12-19 DIAGNOSIS — Z87.891 PERSONAL HISTORY OF TOBACCO USE: ICD-10-CM

## 2024-12-19 DIAGNOSIS — F17.211 CIGARETTE NICOTINE DEPENDENCE IN REMISSION: ICD-10-CM

## 2024-12-19 PROCEDURE — 71271 CT THORAX LUNG CANCER SCR C-: CPT

## 2025-01-20 ENCOUNTER — APPOINTMENT (OUTPATIENT)
Dept: GENERAL RADIOLOGY | Age: 55
End: 2025-01-20
Payer: MEDICARE

## 2025-01-20 ENCOUNTER — HOSPITAL ENCOUNTER (EMERGENCY)
Age: 55
Discharge: HOME | End: 2025-01-21
Attending: EMERGENCY MEDICINE
Payer: MEDICARE

## 2025-01-20 VITALS
BODY MASS INDEX: 43.15 KG/M2 | HEIGHT: 65 IN | SYSTOLIC BLOOD PRESSURE: 126 MMHG | RESPIRATION RATE: 18 BRPM | DIASTOLIC BLOOD PRESSURE: 69 MMHG | TEMPERATURE: 97.7 F | OXYGEN SATURATION: 95 % | WEIGHT: 259 LBS | HEART RATE: 100 BPM

## 2025-01-20 DIAGNOSIS — E86.0 DEHYDRATION: ICD-10-CM

## 2025-01-20 DIAGNOSIS — R06.00 DYSPNEA AND RESPIRATORY ABNORMALITIES: ICD-10-CM

## 2025-01-20 DIAGNOSIS — R05.9 COUGH, UNSPECIFIED TYPE: Primary | ICD-10-CM

## 2025-01-20 DIAGNOSIS — R06.89 DYSPNEA AND RESPIRATORY ABNORMALITIES: ICD-10-CM

## 2025-01-20 DIAGNOSIS — N28.9 RENAL INSUFFICIENCY: ICD-10-CM

## 2025-01-20 PROCEDURE — 82550 ASSAY OF CK (CPK): CPT

## 2025-01-20 PROCEDURE — 82150 ASSAY OF AMYLASE: CPT

## 2025-01-20 PROCEDURE — 83880 ASSAY OF NATRIURETIC PEPTIDE: CPT

## 2025-01-20 PROCEDURE — 85379 FIBRIN DEGRADATION QUANT: CPT

## 2025-01-20 PROCEDURE — 83735 ASSAY OF MAGNESIUM: CPT

## 2025-01-20 PROCEDURE — 99285 EMERGENCY DEPT VISIT HI MDM: CPT

## 2025-01-20 PROCEDURE — 87636 SARSCOV2 & INF A&B AMP PRB: CPT

## 2025-01-20 PROCEDURE — 83690 ASSAY OF LIPASE: CPT

## 2025-01-20 PROCEDURE — 85610 PROTHROMBIN TIME: CPT

## 2025-01-20 PROCEDURE — 85027 COMPLETE CBC AUTOMATED: CPT

## 2025-01-20 PROCEDURE — 84484 ASSAY OF TROPONIN QUANT: CPT

## 2025-01-20 PROCEDURE — 80053 COMPREHEN METABOLIC PANEL: CPT

## 2025-01-20 PROCEDURE — 71045 X-RAY EXAM CHEST 1 VIEW: CPT

## 2025-01-20 PROCEDURE — 82248 BILIRUBIN DIRECT: CPT

## 2025-01-20 RX ORDER — LEVOFLOXACIN 500 MG/1
500 TABLET, FILM COATED ORAL DAILY
COMMUNITY

## 2025-01-20 RX ORDER — FOLIC ACID 1 MG/1
1 TABLET ORAL DAILY
COMMUNITY

## 2025-01-20 ASSESSMENT — PAIN DESCRIPTION - ONSET: ONSET: ON-GOING

## 2025-01-20 ASSESSMENT — PAIN SCALES - GENERAL: PAINLEVEL_OUTOF10: 5

## 2025-01-20 ASSESSMENT — PAIN - FUNCTIONAL ASSESSMENT
PAIN_FUNCTIONAL_ASSESSMENT: ACTIVITIES ARE NOT PREVENTED
PAIN_FUNCTIONAL_ASSESSMENT: 0-10

## 2025-01-20 ASSESSMENT — LIFESTYLE VARIABLES
HOW MANY STANDARD DRINKS CONTAINING ALCOHOL DO YOU HAVE ON A TYPICAL DAY: PATIENT DOES NOT DRINK
HOW OFTEN DO YOU HAVE A DRINK CONTAINING ALCOHOL: NEVER

## 2025-01-20 ASSESSMENT — PAIN DESCRIPTION - LOCATION: LOCATION: GENERALIZED

## 2025-01-20 ASSESSMENT — PAIN DESCRIPTION - FREQUENCY: FREQUENCY: CONTINUOUS

## 2025-01-21 ENCOUNTER — APPOINTMENT (OUTPATIENT)
Dept: CT IMAGING | Age: 55
End: 2025-01-21
Payer: MEDICARE

## 2025-01-21 PROBLEM — N28.9 RENAL INSUFFICIENCY: Status: ACTIVE | Noted: 2025-01-21

## 2025-01-21 PROBLEM — E86.0 DEHYDRATION: Status: ACTIVE | Noted: 2025-01-21

## 2025-01-21 PROBLEM — R06.89 DYSPNEA AND RESPIRATORY ABNORMALITIES: Status: ACTIVE | Noted: 2025-01-21

## 2025-01-21 PROBLEM — R05.9 COUGH: Status: ACTIVE | Noted: 2025-01-21

## 2025-01-21 PROBLEM — R06.00 DYSPNEA AND RESPIRATORY ABNORMALITIES: Status: ACTIVE | Noted: 2025-01-21

## 2025-01-21 LAB
ALBUMIN SERPL-MCNC: 4 G/DL (ref 3.5–5.2)
ALP SERPL-CCNC: 119 U/L (ref 35–104)
ALT SERPL-CCNC: 27 U/L (ref 0–32)
AMYLASE SERPL-CCNC: 82 U/L (ref 20–100)
ANION GAP SERPL CALCULATED.3IONS-SCNC: 16 MMOL/L (ref 7–16)
AST SERPL-CCNC: 18 U/L (ref 0–31)
BILIRUB DIRECT SERPL-MCNC: <0.2 MG/DL (ref 0–0.3)
BILIRUB INDIRECT SERPL-MCNC: ABNORMAL MG/DL (ref 0–1)
BILIRUB SERPL-MCNC: 0.3 MG/DL (ref 0–1.2)
BNP SERPL-MCNC: 47 PG/ML (ref 0–125)
BUN SERPL-MCNC: 28 MG/DL (ref 6–20)
CALCIUM SERPL-MCNC: 9.4 MG/DL (ref 8.6–10.2)
CHLORIDE SERPL-SCNC: 100 MMOL/L (ref 98–107)
CK SERPL-CCNC: 146 U/L (ref 20–180)
CO2 SERPL-SCNC: 22 MMOL/L (ref 22–29)
CREAT SERPL-MCNC: 1.7 MG/DL (ref 0.5–1)
D-DIMER QUANTITATIVE: <200 NG/ML DDU (ref 0–230)
EKG ATRIAL RATE: 101 BPM
EKG P AXIS: 85 DEGREES
EKG P-R INTERVAL: 182 MS
EKG Q-T INTERVAL: 342 MS
EKG QRS DURATION: 84 MS
EKG QTC CALCULATION (BAZETT): 443 MS
EKG R AXIS: 3 DEGREES
EKG T AXIS: 35 DEGREES
EKG VENTRICULAR RATE: 101 BPM
ERYTHROCYTE [DISTWIDTH] IN BLOOD BY AUTOMATED COUNT: 13.2 % (ref 11.5–15)
FLUAV RNA RESP QL NAA+PROBE: NOT DETECTED
FLUBV RNA RESP QL NAA+PROBE: NOT DETECTED
GFR, ESTIMATED: 34 ML/MIN/1.73M2
GLUCOSE SERPL-MCNC: 162 MG/DL (ref 74–99)
HCT VFR BLD AUTO: 35.4 % (ref 34–48)
HGB BLD-MCNC: 11.9 G/DL (ref 11.5–15.5)
INR PPP: 1.1
LIPASE SERPL-CCNC: 62 U/L (ref 13–60)
MAGNESIUM SERPL-MCNC: 2 MG/DL (ref 1.6–2.6)
MCH RBC QN AUTO: 26 PG (ref 26–35)
MCHC RBC AUTO-ENTMCNC: 33.6 G/DL (ref 32–34.5)
MCV RBC AUTO: 77.3 FL (ref 80–99.9)
PLATELET # BLD AUTO: 321 K/UL (ref 130–450)
PMV BLD AUTO: 9.6 FL (ref 7–12)
POTASSIUM SERPL-SCNC: 3.5 MMOL/L (ref 3.5–5)
PROT SERPL-MCNC: 7.4 G/DL (ref 6.4–8.3)
PROTHROMBIN TIME: 12.4 SEC (ref 9.3–12.4)
RBC # BLD AUTO: 4.58 M/UL (ref 3.5–5.5)
SARS-COV-2 RNA RESP QL NAA+PROBE: NOT DETECTED
SODIUM SERPL-SCNC: 138 MMOL/L (ref 132–146)
SOURCE: NORMAL
SPECIMEN DESCRIPTION: NORMAL
TROPONIN I SERPL HS-MCNC: 6 NG/L (ref 0–9)
WBC OTHER # BLD: 10.9 K/UL (ref 4.5–11.5)

## 2025-01-21 PROCEDURE — 93005 ELECTROCARDIOGRAM TRACING: CPT | Performed by: EMERGENCY MEDICINE

## 2025-01-21 PROCEDURE — 2580000003 HC RX 258: Performed by: EMERGENCY MEDICINE

## 2025-01-21 PROCEDURE — 6360000004 HC RX CONTRAST MEDICATION: Performed by: RADIOLOGY

## 2025-01-21 PROCEDURE — 96360 HYDRATION IV INFUSION INIT: CPT

## 2025-01-21 PROCEDURE — 71275 CT ANGIOGRAPHY CHEST: CPT

## 2025-01-21 PROCEDURE — 93010 ELECTROCARDIOGRAM REPORT: CPT | Performed by: INTERNAL MEDICINE

## 2025-01-21 RX ORDER — ALBUTEROL SULFATE 90 UG/1
2 INHALANT RESPIRATORY (INHALATION) EVERY 6 HOURS PRN
Qty: 18 G | Refills: 3 | Status: SHIPPED | OUTPATIENT
Start: 2025-01-21

## 2025-01-21 RX ORDER — 0.9 % SODIUM CHLORIDE 0.9 %
1000 INTRAVENOUS SOLUTION INTRAVENOUS ONCE
Status: COMPLETED | OUTPATIENT
Start: 2025-01-21 | End: 2025-01-21

## 2025-01-21 RX ORDER — IOPAMIDOL 755 MG/ML
75 INJECTION, SOLUTION INTRAVASCULAR
Status: COMPLETED | OUTPATIENT
Start: 2025-01-21 | End: 2025-01-21

## 2025-01-21 RX ADMIN — IOPAMIDOL 75 ML: 755 INJECTION, SOLUTION INTRAVENOUS at 00:41

## 2025-01-21 RX ADMIN — SODIUM CHLORIDE 1000 ML: 9 INJECTION, SOLUTION INTRAVENOUS at 00:50

## 2025-01-21 NOTE — ED PROVIDER NOTES
HPI:  1/21/25, Time: 12:21 AM HUGO Rossi is a 54 y.o. female presenting to the ED for chest pain shortness of breath she has been coughing up nonspecific sputum she used to be a smoker she had a negative chest x-ray before and is negative chest x-ray now her EKG does not show any obvious acute changes and was question whether there the voltage was slightly lower we are getting a CTA of her chest and checking cardiac enzymes as well as a D-dimer and a COVID and a flu which she has had before but we will recheck and get her vital signs are stable her lungs are mostly clear to auscultation, beginning days ago.  The complaint has been persistent, moderate in severity, and worsened by nothing.  Please note the patient has finished 2 rounds of antibiotics Levaquin and Omnicef and she continues to cough up sputum which is why I am getting a CTA of her chest    ROS:   Pertinent positives and negatives are stated within HPI, all other systems reviewed and are negative.  --------------------------------------------- PAST HISTORY ---------------------------------------------  Past Medical History:  has a past medical history of Cancer (HCC), Diabetes mellitus (HCC), Hyperlipidemia, Hypertension, Nerve pain, Obese, and Seasonal allergies.    Past Surgical History:  has a past surgical history that includes shoulder surgery (Left); Neuroma surgery (Right); Breast surgery (years ago); Anesthesia Nerve Block (Right, 6/18/2020); Skin cancer excision (2012); Pain management procedure (N/A, 9/2/2021); Pain management procedure (Right, 12/2/2021); and Pain management procedure (N/A, 6/2/2022).    Social History:  reports that she quit smoking about 19 years ago. Her smoking use included cigarettes. She started smoking about 44 years ago. She has a 25 pack-year smoking history. She has never used smokeless tobacco. She reports current alcohol use. She reports that she does not use drugs.    Family History: family history

## 2025-01-21 NOTE — DISCHARGE INSTRUCTIONS
Return if increased chest pain shortness of breath follow-up with pulmonologist as soon as possible as well as cardiology

## 2025-01-21 NOTE — ED NOTES
Name: Yancy Rossi  : 1970  MRN: [id number]    Date: 2025    Benefits of immediately proceeding with Radiology exam outweigh the risks and therefore the following is being waived:      [x] Pregnancy test    [] Protocol for Iodine allergy    [] MRI questionnaire    [x] BUN/Creatinine        MD Kyle Gleason Robert S, MD  25 0020

## (undated) DEVICE — TRAY EPI SGL DOSE 18GA NDL CUST AULTMAN HOSP

## (undated) DEVICE — CLOTH SURG PREP PREOPERATIVE CHLORHEXIDINE GLUC 2% READYPREP

## (undated) DEVICE — Device: Brand: PORTEX

## (undated) DEVICE — COVER,LIGHT HANDLE,FLX,2/PK: Brand: MEDLINE INDUSTRIES, INC.

## (undated) DEVICE — BINDER ABD UNISX 4 PNL PREM 6INX6INX12IN L XL 4

## (undated) DEVICE — DRAPE CARM MINI FOR IMAG SYS INSIGHT FLROSCN

## (undated) DEVICE — GAUZE,SPONGE,4"X4",16PLY,XRAY,STRL,LF: Brand: MEDLINE

## (undated) DEVICE — NEEDLE HYPO 25GA L1.5IN BLU POLYPR HUB S STL REG BVL STR

## (undated) DEVICE — 12 ML SYRINGE,LUER-LOCK TIP: Brand: MONOJECT

## (undated) DEVICE — DRAPE C ARM W41XL74IN UNIV MOB W RUBBERBAND CLP

## (undated) DEVICE — Z DISCONTINUED APPLICATOR SURG PREP 0.35OZ 2% CHG 70% ISO ALC W/ HI LT

## (undated) DEVICE — NEEDLE SPNL 22GA L3.5IN BLK HUB S STL REG WALL FIT STYL W/

## (undated) DEVICE — INCISE SURGICAL DRAPE: Brand: OPSITE INCISE 15X28CM CTN 10

## (undated) DEVICE — BANDAGE ADH W1XL3IN NAT FAB WVN FLX DURABLE N ADH PD SEAL

## (undated) DEVICE — MASTISOL ADHESIVE LIQ 2/3ML

## (undated) DEVICE — DILATOR VAG MED

## (undated) DEVICE — 3M™ TRANSPORE™ WHITE SURGICAL TAPE 1534-2, 2 INCH X 10 YARD (5CM X 9,1M), 6 ROLLS/CARTON 10 CARTONS/CASE: Brand: 3M™ TRANSPORE™

## (undated) DEVICE — NON-DEHP CATHETER EXTENSION SET, MALE LUER LOCK ADAPTER

## (undated) DEVICE — Z DISCONTINUED USE 2272114 DRAPE SURG UTIL 26X15 IN W/ TAPE N INVASIVE MULTLYR DISP

## (undated) DEVICE — DRAPE,LAPAROTOMY,PCH,STERILE: Brand: MEDLINE

## (undated) DEVICE — NEEDLE HYPO 18GA L1.5IN PNK POLYPR HUB S STL THN WALL FILL

## (undated) DEVICE — GLOVE ORANGE PI 7 1/2   MSG9075

## (undated) DEVICE — SYRINGE, LUER LOCK, 10ML: Brand: MEDLINE

## (undated) DEVICE — PAD,ABDOMINAL,5"X9",ST,LF,25/BX: Brand: MEDLINE INDUSTRIES, INC.

## (undated) DEVICE — SOLUTION ANTISEP ISOPROPYL ALC 70% 16 OZ RUBBING MDS098003Z

## (undated) DEVICE — 6 ML SYRINGE LUER-LOCK TIP: Brand: MONOJECT

## (undated) DEVICE — MARKER,SKIN,WI/RULER AND LABELS: Brand: MEDLINE

## (undated) DEVICE — KIT NEUROMODULATION NEXT GENERATION ANCHR CLIK
Type: IMPLANTABLE DEVICE | Site: BACK | Status: NON-FUNCTIONAL
Removed: 2022-06-02

## (undated) DEVICE — DRAPE,REIN 53X77,STERILE: Brand: MEDLINE

## (undated) DEVICE — 1000 S-DRAPE TOWEL DRAPE 10/BX: Brand: STERI-DRAPE™

## (undated) DEVICE — DRESSING TRNSPAR W4XL55IN ACRYL SUP FLM W ADH WTRPRF OPSITE

## (undated) DEVICE — TOWEL,OR,DSP,ST,BLUE,STD,6/PK,12PK/CS: Brand: MEDLINE

## (undated) DEVICE — COVER,TABLE,60X90,STERILE: Brand: MEDLINE

## (undated) DEVICE — TRAY VCF/TESIO TRAY  REUSABLE

## (undated) DEVICE — GOWN,SIRUS,FABRNF,XL,20/CS: Brand: MEDLINE

## (undated) DEVICE — N300 LEAD ANCHOR KIT
Type: IMPLANTABLE DEVICE | Site: BACK | Status: NON-FUNCTIONAL
Brand: NEVRO®
Removed: 2021-09-02

## (undated) DEVICE — 50CM 16 CONTACT TRIAL LEAD KIT
Type: IMPLANTABLE DEVICE | Site: BACK | Status: NON-FUNCTIONAL
Brand: INFINION™  16

## (undated) DEVICE — Device

## (undated) DEVICE — GAUZE,SPONGE,4"X4",8PLY,STRL,LF,10/TRAY: Brand: MEDLINE

## (undated) DEVICE — GAUZE,SPONGE,4"X4",12PLY,STERILE,LF,2'S: Brand: MEDLINE

## (undated) DEVICE — STRIP,CLOSURE,WOUND,MEDI-STRIP,1/2X4: Brand: MEDLINE

## (undated) DEVICE — SHEET,DRAPE,53X77,STERILE: Brand: MEDLINE

## (undated) DEVICE — 3M™ RED DOT™ MONITORING ELECTRODE WITH FOAM TAPE AND STICKY GEL 2560, 50/BAG, 20/CASE, 72/PLT: Brand: RED DOT™

## (undated) DEVICE — COVER,MAYO STAND,STERILE: Brand: MEDLINE

## (undated) DEVICE — COUNTER NDL 30 COUNT DBL MAG

## (undated) DEVICE — KIT SURG PREP POVIDONE IOD PRESATURATED PAINT WET FOR UNIV

## (undated) DEVICE — ALCOHOL 70% RUBBING 16OZ

## (undated) DEVICE — 13CM (5 INCH) INSERTION NEEDLE

## (undated) DEVICE — SHEET, T, LAPAROTOMY, STERILE: Brand: MEDLINE

## (undated) DEVICE — KIT PATIENT TRIAL ALPHA

## (undated) DEVICE — WRENCH SURG L76CM SPNL CRD HEX STIM SYS SURG EQUIP PRECIS

## (undated) DEVICE — 3M™ COBAN™ NL STERILE NON-LATEX SELF-ADHERENT WRAP, 2084S, 4 IN X 5 YD (10 CM X 4,5 M), 18 ROLLS/CASE: Brand: 3M™ COBAN™

## (undated) DEVICE — SPECIMEN CUP W/LID: Brand: DEROYAL

## (undated) DEVICE — CABLE NEUROSTIM EXTN 1X16 L213CM OR PRECIS SPECTR

## (undated) DEVICE — INSERTION NEEDLE KIT, 6"
Type: IMPLANTABLE DEVICE | Site: BACK | Status: NON-FUNCTIONAL
Brand: NEVRO®
Removed: 2021-09-02

## (undated) DEVICE — SYRINGE, LUER LOCK, 5ML: Brand: MEDLINE